# Patient Record
Sex: MALE | Race: WHITE | NOT HISPANIC OR LATINO
[De-identification: names, ages, dates, MRNs, and addresses within clinical notes are randomized per-mention and may not be internally consistent; named-entity substitution may affect disease eponyms.]

---

## 2017-01-19 ENCOUNTER — NON-APPOINTMENT (OUTPATIENT)
Age: 62
End: 2017-01-19

## 2017-02-01 ENCOUNTER — CHART COPY (OUTPATIENT)
Age: 62
End: 2017-02-01

## 2017-02-03 ENCOUNTER — APPOINTMENT (OUTPATIENT)
Dept: PULMONOLOGY | Facility: CLINIC | Age: 62
End: 2017-02-03

## 2017-02-03 VITALS
WEIGHT: 176 LBS | HEIGHT: 66 IN | SYSTOLIC BLOOD PRESSURE: 123 MMHG | DIASTOLIC BLOOD PRESSURE: 80 MMHG | RESPIRATION RATE: 14 BRPM | BODY MASS INDEX: 28.28 KG/M2 | HEART RATE: 54 BPM | OXYGEN SATURATION: 94 %

## 2017-03-02 ENCOUNTER — CLINICAL ADVICE (OUTPATIENT)
Age: 62
End: 2017-03-02

## 2017-03-10 ENCOUNTER — APPOINTMENT (OUTPATIENT)
Dept: CARDIOLOGY | Facility: CLINIC | Age: 62
End: 2017-03-10

## 2017-03-10 ENCOUNTER — NON-APPOINTMENT (OUTPATIENT)
Age: 62
End: 2017-03-10

## 2017-03-10 VITALS
TEMPERATURE: 98.4 F | SYSTOLIC BLOOD PRESSURE: 140 MMHG | HEART RATE: 54 BPM | BODY MASS INDEX: 26.84 KG/M2 | WEIGHT: 167 LBS | OXYGEN SATURATION: 96 % | HEIGHT: 66 IN | DIASTOLIC BLOOD PRESSURE: 72 MMHG

## 2017-03-22 ENCOUNTER — APPOINTMENT (OUTPATIENT)
Dept: CARDIOLOGY | Facility: CLINIC | Age: 62
End: 2017-03-22

## 2017-04-14 ENCOUNTER — APPOINTMENT (OUTPATIENT)
Dept: CARDIOLOGY | Facility: CLINIC | Age: 62
End: 2017-04-14

## 2017-04-14 DIAGNOSIS — I73.9 PERIPHERAL VASCULAR DISEASE, UNSPECIFIED: ICD-10-CM

## 2017-04-17 ENCOUNTER — MEDICATION RENEWAL (OUTPATIENT)
Age: 62
End: 2017-04-17

## 2017-05-03 ENCOUNTER — APPOINTMENT (OUTPATIENT)
Dept: PULMONOLOGY | Facility: CLINIC | Age: 62
End: 2017-05-03

## 2017-05-09 ENCOUNTER — APPOINTMENT (OUTPATIENT)
Dept: RHEUMATOLOGY | Facility: CLINIC | Age: 62
End: 2017-05-09

## 2017-05-09 VITALS
TEMPERATURE: 98.3 F | SYSTOLIC BLOOD PRESSURE: 140 MMHG | BODY MASS INDEX: 26.03 KG/M2 | DIASTOLIC BLOOD PRESSURE: 73 MMHG | OXYGEN SATURATION: 98 % | HEART RATE: 51 BPM | HEIGHT: 66 IN | WEIGHT: 162 LBS

## 2017-05-09 RX ORDER — PRASUGREL HYDROCHLORIDE 5 MG/1
5 TABLET, COATED ORAL
Refills: 0 | Status: DISCONTINUED | COMMUNITY
End: 2017-05-09

## 2017-05-09 RX ORDER — BUDESONIDE 32 UG/1
32 SPRAY, METERED NASAL TWICE DAILY
Qty: 3 | Refills: 0 | Status: DISCONTINUED | COMMUNITY
Start: 2017-02-03 | End: 2017-05-09

## 2017-05-11 LAB
25(OH)D3 SERPL-MCNC: 30.5 NG/ML
ALBUMIN SERPL ELPH-MCNC: 4.6 G/DL
ALP BLD-CCNC: 54 U/L
ALT SERPL-CCNC: 31 U/L
ANION GAP SERPL CALC-SCNC: 15 MMOL/L
AST SERPL-CCNC: 28 U/L
BASOPHILS # BLD AUTO: 0.04 K/UL
BASOPHILS NFR BLD AUTO: 0.7 %
BILIRUB SERPL-MCNC: 0.4 MG/DL
BUN SERPL-MCNC: 24 MG/DL
CALCIUM SERPL-MCNC: 10.1 MG/DL
CHLORIDE SERPL-SCNC: 101 MMOL/L
CK SERPL-CCNC: 330 U/L
CO2 SERPL-SCNC: 27 MMOL/L
CREAT SERPL-MCNC: 1.08 MG/DL
CRP SERPL-MCNC: <0.2 MG/DL
EOSINOPHIL # BLD AUTO: 0.28 K/UL
EOSINOPHIL NFR BLD AUTO: 4.6 %
ERYTHROCYTE [SEDIMENTATION RATE] IN BLOOD BY WESTERGREN METHOD: 11 MM/HR
GGT SERPL-CCNC: 15 U/L
GLUCOSE SERPL-MCNC: 122 MG/DL
HCT VFR BLD CALC: 48.8 %
HGB BLD-MCNC: 15.7 G/DL
IMM GRANULOCYTES NFR BLD AUTO: 0.2 %
LDH SERPL-CCNC: 226 U/L
LYMPHOCYTES # BLD AUTO: 0.73 K/UL
LYMPHOCYTES NFR BLD AUTO: 12 %
MAN DIFF?: NORMAL
MCHC RBC-ENTMCNC: 30.4 PG
MCHC RBC-ENTMCNC: 32.2 GM/DL
MCV RBC AUTO: 94.6 FL
MONOCYTES # BLD AUTO: 0.4 K/UL
MONOCYTES NFR BLD AUTO: 6.6 %
MYOGLOBIN SERPL-MCNC: 113 NG/ML
NEUTROPHILS # BLD AUTO: 4.61 K/UL
NEUTROPHILS NFR BLD AUTO: 75.9 %
PLATELET # BLD AUTO: 188 K/UL
POTASSIUM SERPL-SCNC: 5.3 MMOL/L
PROT SERPL-MCNC: 6.8 G/DL
RBC # BLD: 5.16 M/UL
RBC # FLD: 13.9 %
SODIUM SERPL-SCNC: 143 MMOL/L
T4 FREE SERPL-MCNC: 1.2 NG/DL
TSH SERPL-ACNC: 1.09 UIU/ML
WBC # FLD AUTO: 6.07 K/UL

## 2017-05-18 LAB
MISCELLANEOUS TEST: NORMAL
PROC NAME: NORMAL

## 2017-05-19 ENCOUNTER — APPOINTMENT (OUTPATIENT)
Dept: PULMONOLOGY | Facility: CLINIC | Age: 62
End: 2017-05-19

## 2017-05-19 VITALS
HEART RATE: 50 BPM | SYSTOLIC BLOOD PRESSURE: 120 MMHG | OXYGEN SATURATION: 95 % | WEIGHT: 159 LBS | RESPIRATION RATE: 17 BRPM | HEIGHT: 66 IN | DIASTOLIC BLOOD PRESSURE: 80 MMHG | BODY MASS INDEX: 25.55 KG/M2

## 2017-07-12 ENCOUNTER — APPOINTMENT (OUTPATIENT)
Dept: RHEUMATOLOGY | Facility: CLINIC | Age: 62
End: 2017-07-12

## 2017-07-12 VITALS
OXYGEN SATURATION: 97 % | BODY MASS INDEX: 25.23 KG/M2 | HEART RATE: 60 BPM | SYSTOLIC BLOOD PRESSURE: 124 MMHG | DIASTOLIC BLOOD PRESSURE: 80 MMHG | TEMPERATURE: 98.3 F | HEIGHT: 66 IN | WEIGHT: 157 LBS

## 2017-07-13 LAB
ALBUMIN SERPL ELPH-MCNC: 4.4 G/DL
ALP BLD-CCNC: 44 U/L
ALT SERPL-CCNC: 35 U/L
ANION GAP SERPL CALC-SCNC: 16 MMOL/L
AST SERPL-CCNC: 43 U/L
BASOPHILS # BLD AUTO: 0.03 K/UL
BASOPHILS NFR BLD AUTO: 0.5 %
BILIRUB SERPL-MCNC: 0.5 MG/DL
BUN SERPL-MCNC: 21 MG/DL
CALCIUM SERPL-MCNC: 9.1 MG/DL
CHLORIDE SERPL-SCNC: 102 MMOL/L
CK SERPL-CCNC: 824 U/L
CO2 SERPL-SCNC: 24 MMOL/L
CREAT SERPL-MCNC: 0.94 MG/DL
CRP SERPL-MCNC: <0.2 MG/DL
EOSINOPHIL # BLD AUTO: 0.5 K/UL
EOSINOPHIL NFR BLD AUTO: 8 %
ERYTHROCYTE [SEDIMENTATION RATE] IN BLOOD BY WESTERGREN METHOD: 6 MM/HR
GLUCOSE SERPL-MCNC: 92 MG/DL
HCT VFR BLD CALC: 46.5 %
HGB BLD-MCNC: 15.3 G/DL
IMM GRANULOCYTES NFR BLD AUTO: 0.2 %
LDH SERPL-CCNC: 287 U/L
LYMPHOCYTES # BLD AUTO: 0.98 K/UL
LYMPHOCYTES NFR BLD AUTO: 15.7 %
MAN DIFF?: NORMAL
MCHC RBC-ENTMCNC: 31.2 PG
MCHC RBC-ENTMCNC: 32.9 GM/DL
MCV RBC AUTO: 94.9 FL
MONOCYTES # BLD AUTO: 0.41 K/UL
MONOCYTES NFR BLD AUTO: 6.6 %
MYOGLOBIN SERPL-MCNC: 249 NG/ML
NEUTROPHILS # BLD AUTO: 4.32 K/UL
NEUTROPHILS NFR BLD AUTO: 69 %
PLATELET # BLD AUTO: 191 K/UL
POTASSIUM SERPL-SCNC: 4.7 MMOL/L
PROT SERPL-MCNC: 6.6 G/DL
RBC # BLD: 4.9 M/UL
RBC # FLD: 13.9 %
SODIUM SERPL-SCNC: 142 MMOL/L
WBC # FLD AUTO: 6.25 K/UL

## 2017-08-16 ENCOUNTER — MESSAGE (OUTPATIENT)
Age: 62
End: 2017-08-16

## 2017-08-18 ENCOUNTER — APPOINTMENT (OUTPATIENT)
Dept: PULMONOLOGY | Facility: CLINIC | Age: 62
End: 2017-08-18

## 2017-09-05 ENCOUNTER — APPOINTMENT (OUTPATIENT)
Dept: CARDIOLOGY | Facility: CLINIC | Age: 62
End: 2017-09-05
Payer: COMMERCIAL

## 2017-09-05 ENCOUNTER — NON-APPOINTMENT (OUTPATIENT)
Age: 62
End: 2017-09-05

## 2017-09-05 VITALS
DIASTOLIC BLOOD PRESSURE: 76 MMHG | OXYGEN SATURATION: 96 % | SYSTOLIC BLOOD PRESSURE: 124 MMHG | HEART RATE: 49 BPM | BODY MASS INDEX: 26.2 KG/M2 | HEIGHT: 66 IN | WEIGHT: 163 LBS

## 2017-09-05 DIAGNOSIS — R60.0 LOCALIZED EDEMA: ICD-10-CM

## 2017-09-05 PROCEDURE — 99215 OFFICE O/P EST HI 40 MIN: CPT

## 2017-09-05 PROCEDURE — 93000 ELECTROCARDIOGRAM COMPLETE: CPT

## 2017-09-06 LAB
ALBUMIN SERPL ELPH-MCNC: 4.4 G/DL
ALP BLD-CCNC: 52 U/L
ALT SERPL-CCNC: 27 U/L
ANION GAP SERPL CALC-SCNC: 15 MMOL/L
AST SERPL-CCNC: 26 U/L
BASOPHILS # BLD AUTO: 0.04 K/UL
BASOPHILS NFR BLD AUTO: 0.6 %
BILIRUB SERPL-MCNC: 0.4 MG/DL
BUN SERPL-MCNC: 20 MG/DL
CALCIUM SERPL-MCNC: 9.5 MG/DL
CHLORIDE SERPL-SCNC: 101 MMOL/L
CK SERPL-CCNC: 354 U/L
CO2 SERPL-SCNC: 28 MMOL/L
CREAT SERPL-MCNC: 0.97 MG/DL
EOSINOPHIL # BLD AUTO: 0.35 K/UL
EOSINOPHIL NFR BLD AUTO: 5.4 %
GLUCOSE SERPL-MCNC: 97 MG/DL
HCT VFR BLD CALC: 47.9 %
HGB BLD-MCNC: 15.1 G/DL
IMM GRANULOCYTES NFR BLD AUTO: 0.2 %
LYMPHOCYTES # BLD AUTO: 1.03 K/UL
LYMPHOCYTES NFR BLD AUTO: 15.8 %
MAN DIFF?: NORMAL
MCHC RBC-ENTMCNC: 30.8 PG
MCHC RBC-ENTMCNC: 31.5 GM/DL
MCV RBC AUTO: 97.8 FL
MONOCYTES # BLD AUTO: 0.5 K/UL
MONOCYTES NFR BLD AUTO: 7.7 %
NEUTROPHILS # BLD AUTO: 4.57 K/UL
NEUTROPHILS NFR BLD AUTO: 70.3 %
PLATELET # BLD AUTO: 197 K/UL
POTASSIUM SERPL-SCNC: 4.6 MMOL/L
PROT SERPL-MCNC: 6.4 G/DL
RBC # BLD: 4.9 M/UL
RBC # FLD: 13.7 %
SODIUM SERPL-SCNC: 144 MMOL/L
WBC # FLD AUTO: 6.5 K/UL

## 2017-09-07 ENCOUNTER — FORM ENCOUNTER (OUTPATIENT)
Age: 62
End: 2017-09-07

## 2017-09-08 ENCOUNTER — APPOINTMENT (OUTPATIENT)
Dept: ULTRASOUND IMAGING | Facility: CLINIC | Age: 62
End: 2017-09-08
Payer: COMMERCIAL

## 2017-09-08 ENCOUNTER — APPOINTMENT (OUTPATIENT)
Dept: CARDIOLOGY | Facility: CLINIC | Age: 62
End: 2017-09-08
Payer: COMMERCIAL

## 2017-09-08 ENCOUNTER — OUTPATIENT (OUTPATIENT)
Dept: OUTPATIENT SERVICES | Facility: HOSPITAL | Age: 62
LOS: 1 days | End: 2017-09-08
Payer: COMMERCIAL

## 2017-09-08 VITALS
OXYGEN SATURATION: 93 % | BODY MASS INDEX: 26.31 KG/M2 | HEART RATE: 56 BPM | WEIGHT: 163 LBS | SYSTOLIC BLOOD PRESSURE: 134 MMHG | DIASTOLIC BLOOD PRESSURE: 79 MMHG

## 2017-09-08 DIAGNOSIS — R60.0 LOCALIZED EDEMA: ICD-10-CM

## 2017-09-08 DIAGNOSIS — Z98.89 OTHER SPECIFIED POSTPROCEDURAL STATES: Chronic | ICD-10-CM

## 2017-09-08 PROCEDURE — 93970 EXTREMITY STUDY: CPT

## 2017-09-08 PROCEDURE — 99214 OFFICE O/P EST MOD 30 MIN: CPT

## 2017-09-08 PROCEDURE — 93970 EXTREMITY STUDY: CPT | Mod: 26

## 2017-09-08 RX ORDER — PREDNISONE 10 MG/1
10 TABLET ORAL
Qty: 1 | Refills: 0 | Status: DISCONTINUED | COMMUNITY
Start: 2017-05-19 | End: 2017-09-08

## 2017-09-08 RX ORDER — DICLOFENAC SODIUM AND MISOPROSTOL 75; 200 MG/1; UG/1
75-0.2 TABLET, DELAYED RELEASE ORAL
Qty: 60 | Refills: 2 | Status: DISCONTINUED | COMMUNITY
Start: 2017-02-03 | End: 2017-09-08

## 2017-09-08 RX ORDER — CLOPIDOGREL BISULFATE 75 MG/1
75 TABLET, FILM COATED ORAL
Qty: 90 | Refills: 0 | Status: DISCONTINUED | COMMUNITY
Start: 2017-05-09 | End: 2017-09-08

## 2017-11-08 ENCOUNTER — NON-APPOINTMENT (OUTPATIENT)
Age: 62
End: 2017-11-08

## 2017-11-08 ENCOUNTER — APPOINTMENT (OUTPATIENT)
Dept: CARDIOLOGY | Facility: CLINIC | Age: 62
End: 2017-11-08
Payer: COMMERCIAL

## 2017-11-08 VITALS
OXYGEN SATURATION: 95 % | HEART RATE: 52 BPM | HEIGHT: 66 IN | WEIGHT: 161 LBS | BODY MASS INDEX: 25.88 KG/M2 | DIASTOLIC BLOOD PRESSURE: 80 MMHG | TEMPERATURE: 98.2 F | SYSTOLIC BLOOD PRESSURE: 132 MMHG

## 2017-11-08 PROCEDURE — 99215 OFFICE O/P EST HI 40 MIN: CPT

## 2017-11-08 PROCEDURE — 93000 ELECTROCARDIOGRAM COMPLETE: CPT

## 2017-11-08 RX ORDER — AZITHROMYCIN 500 MG/1
500 TABLET, FILM COATED ORAL DAILY
Qty: 7 | Refills: 0 | Status: DISCONTINUED | COMMUNITY
Start: 2017-05-19 | End: 2017-11-08

## 2017-11-09 LAB
ALBUMIN SERPL ELPH-MCNC: 4.3 G/DL
ALP BLD-CCNC: 52 U/L
ALT SERPL-CCNC: 30 U/L
ANION GAP SERPL CALC-SCNC: 14 MMOL/L
AST SERPL-CCNC: 25 U/L
BASOPHILS # BLD AUTO: 0.05 K/UL
BASOPHILS NFR BLD AUTO: 0.8 %
BILIRUB SERPL-MCNC: 0.4 MG/DL
BUN SERPL-MCNC: 28 MG/DL
CALCIUM SERPL-MCNC: 9.4 MG/DL
CHLORIDE SERPL-SCNC: 101 MMOL/L
CHOLEST SERPL-MCNC: 165 MG/DL
CHOLEST/HDLC SERPL: 2.4 RATIO
CK SERPL-CCNC: 336 U/L
CO2 SERPL-SCNC: 25 MMOL/L
CREAT SERPL-MCNC: 1.14 MG/DL
EOSINOPHIL # BLD AUTO: 0.57 K/UL
EOSINOPHIL NFR BLD AUTO: 9.4 %
GLUCOSE SERPL-MCNC: 110 MG/DL
HCT VFR BLD CALC: 48.4 %
HDLC SERPL-MCNC: 68 MG/DL
HGB BLD-MCNC: 16.1 G/DL
IMM GRANULOCYTES NFR BLD AUTO: 0.2 %
LDLC SERPL CALC-MCNC: 81 MG/DL
LYMPHOCYTES # BLD AUTO: 1.04 K/UL
LYMPHOCYTES NFR BLD AUTO: 17.2 %
MAN DIFF?: NORMAL
MCHC RBC-ENTMCNC: 31.9 PG
MCHC RBC-ENTMCNC: 33.3 GM/DL
MCV RBC AUTO: 95.8 FL
MONOCYTES # BLD AUTO: 0.42 K/UL
MONOCYTES NFR BLD AUTO: 6.9 %
NEUTROPHILS # BLD AUTO: 3.96 K/UL
NEUTROPHILS NFR BLD AUTO: 65.5 %
PLATELET # BLD AUTO: 166 K/UL
POTASSIUM SERPL-SCNC: 5.3 MMOL/L
PROT SERPL-MCNC: 6.4 G/DL
RBC # BLD: 5.05 M/UL
RBC # FLD: 13.2 %
SODIUM SERPL-SCNC: 140 MMOL/L
TRIGL SERPL-MCNC: 79 MG/DL
WBC # FLD AUTO: 6.05 K/UL

## 2017-11-16 DIAGNOSIS — F32.9 MAJOR DEPRESSIVE DISORDER, SINGLE EPISODE, UNSPECIFIED: ICD-10-CM

## 2017-11-16 RX ORDER — ESCITALOPRAM OXALATE 10 MG/1
10 TABLET ORAL
Qty: 30 | Refills: 3 | Status: DISCONTINUED | COMMUNITY
Start: 2017-11-10 | End: 2017-11-16

## 2017-12-08 ENCOUNTER — APPOINTMENT (OUTPATIENT)
Dept: CARDIOLOGY | Facility: CLINIC | Age: 62
End: 2017-12-08
Payer: COMMERCIAL

## 2017-12-08 ENCOUNTER — NON-APPOINTMENT (OUTPATIENT)
Age: 62
End: 2017-12-08

## 2017-12-08 VITALS
OXYGEN SATURATION: 98 % | BODY MASS INDEX: 26.03 KG/M2 | HEART RATE: 51 BPM | DIASTOLIC BLOOD PRESSURE: 78 MMHG | WEIGHT: 162 LBS | HEIGHT: 66 IN | SYSTOLIC BLOOD PRESSURE: 122 MMHG | TEMPERATURE: 97.6 F

## 2017-12-08 PROCEDURE — 99215 OFFICE O/P EST HI 40 MIN: CPT

## 2017-12-08 PROCEDURE — 93000 ELECTROCARDIOGRAM COMPLETE: CPT

## 2018-01-12 ENCOUNTER — APPOINTMENT (OUTPATIENT)
Dept: PULMONOLOGY | Facility: CLINIC | Age: 63
End: 2018-01-12
Payer: COMMERCIAL

## 2018-01-12 VITALS
RESPIRATION RATE: 17 BRPM | SYSTOLIC BLOOD PRESSURE: 130 MMHG | BODY MASS INDEX: 26.03 KG/M2 | OXYGEN SATURATION: 95 % | DIASTOLIC BLOOD PRESSURE: 70 MMHG | TEMPERATURE: 100 F | WEIGHT: 162 LBS | HEIGHT: 66 IN | HEART RATE: 75 BPM

## 2018-01-12 PROCEDURE — 71046 X-RAY EXAM CHEST 2 VIEWS: CPT

## 2018-01-12 PROCEDURE — 99214 OFFICE O/P EST MOD 30 MIN: CPT | Mod: 25

## 2018-01-14 ENCOUNTER — EMERGENCY (EMERGENCY)
Facility: HOSPITAL | Age: 63
LOS: 1 days | Discharge: HOME | End: 2018-01-14
Attending: EMERGENCY MEDICINE
Payer: COMMERCIAL

## 2018-01-14 VITALS
OXYGEN SATURATION: 98 % | TEMPERATURE: 97 F | HEART RATE: 51 BPM | DIASTOLIC BLOOD PRESSURE: 74 MMHG | SYSTOLIC BLOOD PRESSURE: 113 MMHG | RESPIRATION RATE: 17 BRPM

## 2018-01-14 VITALS
OXYGEN SATURATION: 99 % | TEMPERATURE: 99 F | RESPIRATION RATE: 18 BRPM | SYSTOLIC BLOOD PRESSURE: 118 MMHG | HEART RATE: 58 BPM | DIASTOLIC BLOOD PRESSURE: 70 MMHG

## 2018-01-14 DIAGNOSIS — Z98.89 OTHER SPECIFIED POSTPROCEDURAL STATES: Chronic | ICD-10-CM

## 2018-01-14 LAB
ALBUMIN SERPL ELPH-MCNC: 3.9 G/DL — SIGNIFICANT CHANGE UP (ref 3.3–5)
ALP SERPL-CCNC: 57 U/L — SIGNIFICANT CHANGE UP (ref 40–120)
ALT FLD-CCNC: 59 U/L RC — HIGH (ref 10–45)
ANION GAP SERPL CALC-SCNC: 11 MMOL/L — SIGNIFICANT CHANGE UP (ref 5–17)
APPEARANCE UR: CLEAR — SIGNIFICANT CHANGE UP
AST SERPL-CCNC: 44 U/L — HIGH (ref 10–40)
BASOPHILS # BLD AUTO: 0 K/UL — SIGNIFICANT CHANGE UP (ref 0–0.2)
BASOPHILS NFR BLD AUTO: 0.1 % — SIGNIFICANT CHANGE UP (ref 0–2)
BILIRUB SERPL-MCNC: 0.3 MG/DL — SIGNIFICANT CHANGE UP (ref 0.2–1.2)
BILIRUB UR-MCNC: NEGATIVE — SIGNIFICANT CHANGE UP
BUN SERPL-MCNC: 16 MG/DL — SIGNIFICANT CHANGE UP (ref 7–23)
CALCIUM SERPL-MCNC: 9.1 MG/DL — SIGNIFICANT CHANGE UP (ref 8.4–10.5)
CHLORIDE SERPL-SCNC: 99 MMOL/L — SIGNIFICANT CHANGE UP (ref 96–108)
CO2 SERPL-SCNC: 26 MMOL/L — SIGNIFICANT CHANGE UP (ref 22–31)
COLOR SPEC: SIGNIFICANT CHANGE UP
CREAT SERPL-MCNC: 1.02 MG/DL — SIGNIFICANT CHANGE UP (ref 0.5–1.3)
DIFF PNL FLD: NEGATIVE — SIGNIFICANT CHANGE UP
EOSINOPHIL # BLD AUTO: 0.3 K/UL — SIGNIFICANT CHANGE UP (ref 0–0.5)
EOSINOPHIL NFR BLD AUTO: 6.4 % — HIGH (ref 0–6)
GAS PNL BLDV: SIGNIFICANT CHANGE UP
GLUCOSE SERPL-MCNC: 86 MG/DL — SIGNIFICANT CHANGE UP (ref 70–99)
GLUCOSE UR QL: NEGATIVE — SIGNIFICANT CHANGE UP
HCT VFR BLD CALC: 48.8 % — SIGNIFICANT CHANGE UP (ref 39–50)
HGB BLD-MCNC: 16.6 G/DL — SIGNIFICANT CHANGE UP (ref 13–17)
KETONES UR-MCNC: NEGATIVE — SIGNIFICANT CHANGE UP
LEUKOCYTE ESTERASE UR-ACNC: NEGATIVE — SIGNIFICANT CHANGE UP
LYMPHOCYTES # BLD AUTO: 0.9 K/UL — LOW (ref 1–3.3)
LYMPHOCYTES # BLD AUTO: 18.1 % — SIGNIFICANT CHANGE UP (ref 13–44)
MCHC RBC-ENTMCNC: 33.2 PG — SIGNIFICANT CHANGE UP (ref 27–34)
MCHC RBC-ENTMCNC: 34.1 GM/DL — SIGNIFICANT CHANGE UP (ref 32–36)
MCV RBC AUTO: 97.4 FL — SIGNIFICANT CHANGE UP (ref 80–100)
MONOCYTES # BLD AUTO: 0.7 K/UL — SIGNIFICANT CHANGE UP (ref 0–0.9)
MONOCYTES NFR BLD AUTO: 13.9 % — SIGNIFICANT CHANGE UP (ref 2–14)
NEUTROPHILS # BLD AUTO: 3.1 K/UL — SIGNIFICANT CHANGE UP (ref 1.8–7.4)
NEUTROPHILS NFR BLD AUTO: 61.5 % — SIGNIFICANT CHANGE UP (ref 43–77)
NITRITE UR-MCNC: NEGATIVE — SIGNIFICANT CHANGE UP
PH UR: 6 — SIGNIFICANT CHANGE UP (ref 5–8)
PLATELET # BLD AUTO: 105 K/UL — LOW (ref 150–400)
POTASSIUM SERPL-MCNC: 4.8 MMOL/L — SIGNIFICANT CHANGE UP (ref 3.5–5.3)
POTASSIUM SERPL-SCNC: 4.8 MMOL/L — SIGNIFICANT CHANGE UP (ref 3.5–5.3)
PROT SERPL-MCNC: 7.1 G/DL — SIGNIFICANT CHANGE UP (ref 6–8.3)
PROT UR-MCNC: NEGATIVE — SIGNIFICANT CHANGE UP
RBC # BLD: 5.01 M/UL — SIGNIFICANT CHANGE UP (ref 4.2–5.8)
RBC # FLD: 12.4 % — SIGNIFICANT CHANGE UP (ref 10.3–14.5)
SODIUM SERPL-SCNC: 136 MMOL/L — SIGNIFICANT CHANGE UP (ref 135–145)
SP GR SPEC: 1.01 — SIGNIFICANT CHANGE UP (ref 1.01–1.02)
UROBILINOGEN FLD QL: NEGATIVE — SIGNIFICANT CHANGE UP
WBC # BLD: 5 K/UL — SIGNIFICANT CHANGE UP (ref 3.8–10.5)
WBC # FLD AUTO: 5 K/UL — SIGNIFICANT CHANGE UP (ref 3.8–10.5)
WBC UR QL: SIGNIFICANT CHANGE UP /HPF (ref 0–5)

## 2018-01-14 PROCEDURE — 85027 COMPLETE CBC AUTOMATED: CPT

## 2018-01-14 PROCEDURE — 81001 URINALYSIS AUTO W/SCOPE: CPT

## 2018-01-14 PROCEDURE — 82435 ASSAY OF BLOOD CHLORIDE: CPT

## 2018-01-14 PROCEDURE — 83605 ASSAY OF LACTIC ACID: CPT

## 2018-01-14 PROCEDURE — 96372 THER/PROPH/DIAG INJ SC/IM: CPT

## 2018-01-14 PROCEDURE — 99283 EMERGENCY DEPT VISIT LOW MDM: CPT | Mod: 25

## 2018-01-14 PROCEDURE — 82330 ASSAY OF CALCIUM: CPT

## 2018-01-14 PROCEDURE — 84295 ASSAY OF SERUM SODIUM: CPT

## 2018-01-14 PROCEDURE — 80053 COMPREHEN METABOLIC PANEL: CPT

## 2018-01-14 PROCEDURE — 82947 ASSAY GLUCOSE BLOOD QUANT: CPT

## 2018-01-14 PROCEDURE — 71046 X-RAY EXAM CHEST 2 VIEWS: CPT | Mod: 26

## 2018-01-14 PROCEDURE — 71046 X-RAY EXAM CHEST 2 VIEWS: CPT

## 2018-01-14 PROCEDURE — 84132 ASSAY OF SERUM POTASSIUM: CPT

## 2018-01-14 PROCEDURE — 99284 EMERGENCY DEPT VISIT MOD MDM: CPT

## 2018-01-14 PROCEDURE — 82803 BLOOD GASES ANY COMBINATION: CPT

## 2018-01-14 PROCEDURE — 85014 HEMATOCRIT: CPT

## 2018-01-14 RX ORDER — CEFTRIAXONE 500 MG/1
250 INJECTION, POWDER, FOR SOLUTION INTRAMUSCULAR; INTRAVENOUS ONCE
Qty: 0 | Refills: 0 | Status: COMPLETED | OUTPATIENT
Start: 2018-01-14 | End: 2018-01-14

## 2018-01-14 RX ORDER — AZITHROMYCIN 500 MG/1
1000 TABLET, FILM COATED ORAL ONCE
Qty: 0 | Refills: 0 | Status: COMPLETED | OUTPATIENT
Start: 2018-01-14 | End: 2018-01-14

## 2018-01-14 RX ADMIN — AZITHROMYCIN 1000 MILLIGRAM(S): 500 TABLET, FILM COATED ORAL at 17:14

## 2018-01-14 RX ADMIN — CEFTRIAXONE 250 MILLIGRAM(S): 500 INJECTION, POWDER, FOR SOLUTION INTRAMUSCULAR; INTRAVENOUS at 17:14

## 2018-01-14 NOTE — ED ADULT NURSE NOTE - CHPI ED SYMPTOMS NEG
no pain/no decreased eating/drinking/no chills/no nausea/no numbness/no vomiting/no dizziness/no fever/no weakness/no tingling

## 2018-01-14 NOTE — ED PROVIDER NOTE - CARE PLAN
Principal Discharge DX:	Fever, unspecified fever cause  Instructions for follow-up, activity and diet:	You were seen in the ER for fever and penile pain. You must follow up with your primary physician in 24 to 48 hours. Return to the ER for any new or worsening signs/symptoms.   1) Take your antibiotics as prescribed.   2) Always wear condoms when having sexual intercourse.  Secondary Diagnosis:	Penile pain Principal Discharge DX:	Fever, unspecified fever cause  Assessment and plan of treatment:	You were seen in the ER for fever and penile pain. You must follow up with your primary physician in 24 to 48 hours. Return to the ER for any new or worsening signs/symptoms.   1) Take your antibiotics as prescribed.   2) Always wear condoms when having sexual intercourse.  Secondary Diagnosis:	Penile pain

## 2018-01-14 NOTE — ED PROVIDER NOTE - NS ED ROS FT
EYES: no change in vision, //             HEENT: no trouble swallowing or speaking, //             CARDIAC: no chest pain, //              PULMONARY: no cough or SOB, //             GI: no abdominal pain, no nausea or no vomiting, no diarrhea or constipation, //             : No changes in urination,  //            SKIN: no rashes,  //            NEURO: no headache,  //             MSK: No joint pain otherwise as HPI or negative. ~Edmund Askew M.D., Ph.D. -Resident

## 2018-01-14 NOTE — ED PROVIDER NOTE - PHYSICAL EXAMINATION
Gen: NAD, AOx3, non-toxic //            Head: NCAT //            HEENT: EOMI, oral mucosa moist, normal conjunctiva //            Lung: CTAB, no respiratory distress, no wheezes/rhonchi/rales B/L, speaking in full sentences. //            CV: RRR, no murmurs, rubs or gallops //            Abd: soft, NTND, no guarding, no CVA tenderness //            MSK: no visible deformities //            Neuro: No focal sensory or motor deficits //            Skin: Warm, well perfused, no rash //    : normal genitalia with normal cremasteric reflex and no tenderness throughout.         Psych: normal affect. ~Edmund Askew M.D., Ph.D. -Resident

## 2018-01-14 NOTE — ED PROVIDER NOTE - PROGRESS NOTE DETAILS
Patient's blood work, urinalysis, CXR unremarkable. Treated for STIs. Will d/c home with close PCP Follow up. Attending MD Kraft: Patient re-evaluated and feeling improved.  No acute issues at  this time.  Lab and radiology tests reviewed with patient and/or family.  Patient stable for discharge. Attending MD Kraft: Patient re-evaluated and feeling improved.  Lab and radiology tests reviewed with patient.  Patient stable for discharge.  Follow up instructions given, importance of follow up emphasized, return to ED parameters reviewed and patient verbalized understanding.  All questions answered, all concerns addressed.

## 2018-01-14 NOTE — ED ADULT NURSE NOTE - OBJECTIVE STATEMENT
62 year old male presented to ED with c/o of burning on urination/fever since January 11th. Pt went to urgent care on January 11th and placed on antibiotics. Pt states the burning has not stopped, but has gotten better. Pt denies blood in urine/stool. Pt denies CP, SOB, nausea/vomiting, numbness/tingling, cough, chills, dizziness, headache. Pt is afebrile in ED, temperature 99.1 orally. Pt a&ox3, lung sounds clear, heart rate regular, abdomen soft nontender nondistended to palp. Skin intact. IV in right AC 20G and patent. Pt currently resting in bed with side rails up for safety. Will continue to monitor and assess while offering support and reassurance.

## 2018-01-14 NOTE — ED PROVIDER NOTE - ATTENDING CONTRIBUTION TO CARE
Attending MD Kraft: I personally have seen and examined this patient.  Resident note reviewed and agree on plan of care and except where noted.  See below for details.     62M with PMH including BPH, CAD x stents, sarcoid, IBS, "inflammed prostate" presents to the ED with penile pain and fever.  Reports fever with mild burning on urination.  Nick was having fevers to 103 last night.  Nick symptoms started on Thursday, was seen at urgent care on 1/11/18 and was started on Cipro.  Took 1 pill x 2, nick had on his stomach.  Switched to Bactrim BID x 2d.  Nick continues to have painful, difficulty urination.  Nick is sexually active with extramarital partner including this week, denies condom use, denies history of STIs.  Declined HIV testing.  Nick saw pulmonologist earlier this week and had nonactionable CXR.  Denies abdominal pain, nausea, vomiting, blood in stools.  Denies magalie hematuria.  Denies chest pain, shortness of breath, palpitations. Meds: Toprol, Altase, Lipitor, ASA, Neurontin.  Denies Allergies.  Denies EtOH, tobacco, drugs.  On exam, NAD, head NCAT, PERRL, FROM at neck, no tenderness to palpation or stepoffs along length of spine, lungs CTAB with good inspiratory effort, +S1S2, no m/r/g, abdomen soft with +BS, NT, ND, no CVAT, genital exam with Dr. Askew chaperone, no penile lesions, no inguinal LAD, no testicular tenderness to palpation, no blood or purulence at meatus, moving all extremities with 5/5 strength bilateral upper and lower extremities, good and equal  strength bilaterally; A/P: 62M with penile pain and fever, Ddx includes UTI, STI, will obtain UA, G&C, will treat with azithro and Ceftriaxone empirically, counseled on having all partners tested and abstaining until all have completed treatment to avoid re-infection if +, declined HIV, given worsening fever, will obtain CXR and labs to insure no other infectious etiology, reassess

## 2018-01-14 NOTE — ED PROVIDER NOTE - MEDICAL DECISION MAKING DETAILS
Patient with fever of uknown origin but most likely penile. may be 2/2 STI vs e.coli. Patient already being treated by abx outpatient. Will treat for STI here, do CXr to look for pna. likely d/c home.

## 2018-01-14 NOTE — ED PROVIDER NOTE - PLAN OF CARE
You were seen in the ER for fever and penile pain. You must follow up with your primary physician in 24 to 48 hours. Return to the ER for any new or worsening signs/symptoms.   1) Take your antibiotics as prescribed.   2) Always wear condoms when having sexual intercourse.

## 2018-01-14 NOTE — ED PROVIDER NOTE - OBJECTIVE STATEMENT
62 male history of BPH here for penile pain and fever. States had penile pain that started thu, went to urgent care, was started on cipro. Friday pain did not improve, was switched to cephalosporin but continued to have fevers to tmax 103 orally yesterday. Penile pain has since improved and today no fevers. Had CXR 2 days ago that was normal at his pulmonoligist office. Had unprotected sexual intercourse recently with new partner.

## 2018-01-14 NOTE — ED ADULT NURSE NOTE - PSH
H/O cardiac catheterization    S/P colonoscopy    S/P endoscopy    S/P primary angioplasty with coronary stent  2 stents

## 2018-01-14 NOTE — ED ADULT NURSE NOTE - PMH
BPH (Benign Prostatic Hyperplasia)    CAD (coronary artery disease)  stents x2 1995  GI bleed  abdominal pain and bloody diarrhea which resolved on its own no relation to antibiotics Colonoscopy did not reveal abnormality.  Pt states occurred two times over 5 years ago.  H/O hyperlipidemia    HTN - Hypertension    Myalgia    NSVT (nonsustained ventricular tachycardia)  found on holter monitor  Sarcoidosis    Sleep apnea  diagnosed by sleep study - unable to tolerate CPAP  Stomach ulcer

## 2018-01-14 NOTE — ED ADULT TRIAGE NOTE - CHIEF COMPLAINT QUOTE
pt c/o fever associated with mild burning during urination.  pt [facility] but the fever has worsened

## 2018-01-15 LAB
C TRACH RRNA SPEC QL NAA+PROBE: SIGNIFICANT CHANGE UP
N GONORRHOEA RRNA SPEC QL NAA+PROBE: SIGNIFICANT CHANGE UP
SPECIMEN SOURCE: SIGNIFICANT CHANGE UP

## 2018-01-19 ENCOUNTER — EMERGENCY (EMERGENCY)
Facility: HOSPITAL | Age: 63
LOS: 1 days | Discharge: HOME | End: 2018-01-19
Attending: EMERGENCY MEDICINE | Admitting: EMERGENCY MEDICINE
Payer: COMMERCIAL

## 2018-01-19 VITALS
OXYGEN SATURATION: 99 % | HEART RATE: 95 BPM | DIASTOLIC BLOOD PRESSURE: 85 MMHG | RESPIRATION RATE: 17 BRPM | SYSTOLIC BLOOD PRESSURE: 137 MMHG | TEMPERATURE: 98 F

## 2018-01-19 DIAGNOSIS — Z98.89 OTHER SPECIFIED POSTPROCEDURAL STATES: Chronic | ICD-10-CM

## 2018-01-19 PROCEDURE — 99285 EMERGENCY DEPT VISIT HI MDM: CPT | Mod: 25

## 2018-01-19 RX ORDER — LIDOCAINE 4 G/100G
10 CREAM TOPICAL ONCE
Qty: 0 | Refills: 0 | Status: COMPLETED | OUTPATIENT
Start: 2018-01-19 | End: 2018-01-19

## 2018-01-19 RX ORDER — MORPHINE SULFATE 50 MG/1
4 CAPSULE, EXTENDED RELEASE ORAL ONCE
Qty: 0 | Refills: 0 | Status: DISCONTINUED | OUTPATIENT
Start: 2018-01-19 | End: 2018-01-19

## 2018-01-19 RX ADMIN — Medication 10 MILLILITER(S): at 23:33

## 2018-01-19 RX ADMIN — LIDOCAINE 10 MILLILITER(S): 4 CREAM TOPICAL at 23:37

## 2018-01-19 RX ADMIN — Medication 30 MILLILITER(S): at 23:37

## 2018-01-19 RX ADMIN — MORPHINE SULFATE 4 MILLIGRAM(S): 50 CAPSULE, EXTENDED RELEASE ORAL at 23:38

## 2018-01-19 NOTE — ED ADULT NURSE NOTE - OBJECTIVE STATEMENT
62 y.o male aaox3 ambulatory with h/o PH (Benign Prostatic Hyperplasia)  CAD (coronary artery disease)  stents x2 1995GI bleed  abdominal pain andbloodydiarrhea which resolved on its own no relation to antibiotics Colonoscopy did not reveal abnormality.  Pt states occurred two times over 5 years ago.H/O hyperlipidemia  HTN - Hypertension  Myalgia  NSVT (nonsustained ventricular tachycardia)  found on holter monitorSarcoidosis  Sleep apnea  diagnosed by sleep study - unable to tolerate CPAP Stomach ulcer presented today with c/o Abd pain,  radiating to the back.  Patient had recent visit to [facility] ED for penile pain and dc and on current antibiotic treatment for STD. Denies nausea, vomiting, blood in urine or stool.

## 2018-01-20 VITALS
HEART RATE: 66 BPM | OXYGEN SATURATION: 97 % | SYSTOLIC BLOOD PRESSURE: 138 MMHG | DIASTOLIC BLOOD PRESSURE: 83 MMHG | RESPIRATION RATE: 18 BRPM | TEMPERATURE: 99 F

## 2018-01-20 LAB
ANION GAP SERPL CALC-SCNC: 9 MMOL/L — SIGNIFICANT CHANGE UP (ref 5–17)
APPEARANCE UR: CLEAR — SIGNIFICANT CHANGE UP
BILIRUB UR-MCNC: NEGATIVE — SIGNIFICANT CHANGE UP
BUN SERPL-MCNC: 20 MG/DL — SIGNIFICANT CHANGE UP (ref 7–23)
CALCIUM SERPL-MCNC: 8.8 MG/DL — SIGNIFICANT CHANGE UP (ref 8.4–10.5)
CHLORIDE SERPL-SCNC: 103 MMOL/L — SIGNIFICANT CHANGE UP (ref 96–108)
CO2 SERPL-SCNC: 27 MMOL/L — SIGNIFICANT CHANGE UP (ref 22–31)
COLOR SPEC: SIGNIFICANT CHANGE UP
CREAT SERPL-MCNC: 1.17 MG/DL — SIGNIFICANT CHANGE UP (ref 0.5–1.3)
DIFF PNL FLD: NEGATIVE — SIGNIFICANT CHANGE UP
GLUCOSE SERPL-MCNC: 106 MG/DL — HIGH (ref 70–99)
GLUCOSE UR QL: NEGATIVE — SIGNIFICANT CHANGE UP
KETONES UR-MCNC: NEGATIVE — SIGNIFICANT CHANGE UP
LEUKOCYTE ESTERASE UR-ACNC: NEGATIVE — SIGNIFICANT CHANGE UP
NITRITE UR-MCNC: NEGATIVE — SIGNIFICANT CHANGE UP
PH UR: 7.5 — SIGNIFICANT CHANGE UP (ref 5–8)
POTASSIUM SERPL-MCNC: 4.7 MMOL/L — SIGNIFICANT CHANGE UP (ref 3.5–5.3)
POTASSIUM SERPL-SCNC: 4.7 MMOL/L — SIGNIFICANT CHANGE UP (ref 3.5–5.3)
PROT UR-MCNC: NEGATIVE — SIGNIFICANT CHANGE UP
SODIUM SERPL-SCNC: 139 MMOL/L — SIGNIFICANT CHANGE UP (ref 135–145)
SP GR SPEC: 1.01 — SIGNIFICANT CHANGE UP (ref 1.01–1.02)
UROBILINOGEN FLD QL: NEGATIVE — SIGNIFICANT CHANGE UP

## 2018-01-20 PROCEDURE — 80048 BASIC METABOLIC PNL TOTAL CA: CPT

## 2018-01-20 PROCEDURE — 84132 ASSAY OF SERUM POTASSIUM: CPT

## 2018-01-20 PROCEDURE — 82435 ASSAY OF BLOOD CHLORIDE: CPT

## 2018-01-20 PROCEDURE — 85730 THROMBOPLASTIN TIME PARTIAL: CPT

## 2018-01-20 PROCEDURE — 85027 COMPLETE CBC AUTOMATED: CPT

## 2018-01-20 PROCEDURE — 85610 PROTHROMBIN TIME: CPT

## 2018-01-20 PROCEDURE — 99284 EMERGENCY DEPT VISIT MOD MDM: CPT | Mod: 25

## 2018-01-20 PROCEDURE — 83605 ASSAY OF LACTIC ACID: CPT

## 2018-01-20 PROCEDURE — 82803 BLOOD GASES ANY COMBINATION: CPT

## 2018-01-20 PROCEDURE — 81003 URINALYSIS AUTO W/O SCOPE: CPT

## 2018-01-20 PROCEDURE — 84484 ASSAY OF TROPONIN QUANT: CPT

## 2018-01-20 PROCEDURE — 74177 CT ABD & PELVIS W/CONTRAST: CPT

## 2018-01-20 PROCEDURE — 96374 THER/PROPH/DIAG INJ IV PUSH: CPT | Mod: XU

## 2018-01-20 PROCEDURE — 84295 ASSAY OF SERUM SODIUM: CPT

## 2018-01-20 PROCEDURE — 83690 ASSAY OF LIPASE: CPT

## 2018-01-20 PROCEDURE — 82330 ASSAY OF CALCIUM: CPT

## 2018-01-20 PROCEDURE — 74177 CT ABD & PELVIS W/CONTRAST: CPT | Mod: 26

## 2018-01-20 PROCEDURE — 80053 COMPREHEN METABOLIC PANEL: CPT

## 2018-01-20 PROCEDURE — 93005 ELECTROCARDIOGRAM TRACING: CPT

## 2018-01-20 PROCEDURE — 82565 ASSAY OF CREATININE: CPT

## 2018-01-20 PROCEDURE — 85014 HEMATOCRIT: CPT

## 2018-01-20 PROCEDURE — 82947 ASSAY GLUCOSE BLOOD QUANT: CPT

## 2018-01-20 RX ORDER — SODIUM CHLORIDE 9 MG/ML
1000 INJECTION INTRAMUSCULAR; INTRAVENOUS; SUBCUTANEOUS ONCE
Qty: 0 | Refills: 0 | Status: COMPLETED | OUTPATIENT
Start: 2018-01-20 | End: 2018-01-20

## 2018-01-20 RX ORDER — ACETAMINOPHEN 500 MG
975 TABLET ORAL ONCE
Qty: 0 | Refills: 0 | Status: COMPLETED | OUTPATIENT
Start: 2018-01-20 | End: 2018-01-20

## 2018-01-20 RX ORDER — SODIUM CHLORIDE 9 MG/ML
1000 INJECTION INTRAMUSCULAR; INTRAVENOUS; SUBCUTANEOUS
Qty: 0 | Refills: 0 | Status: DISCONTINUED | OUTPATIENT
Start: 2018-01-20 | End: 2018-01-23

## 2018-01-20 RX ORDER — KETOROLAC TROMETHAMINE 30 MG/ML
15 SYRINGE (ML) INJECTION ONCE
Qty: 0 | Refills: 0 | Status: DISCONTINUED | OUTPATIENT
Start: 2018-01-20 | End: 2018-01-20

## 2018-01-20 RX ORDER — POLYETHYLENE GLYCOL 3350 17 G/17G
17 POWDER, FOR SOLUTION ORAL ONCE
Qty: 0 | Refills: 0 | Status: COMPLETED | OUTPATIENT
Start: 2018-01-20 | End: 2018-01-20

## 2018-01-20 RX ORDER — OXYCODONE HYDROCHLORIDE 5 MG/1
5 TABLET ORAL ONCE
Qty: 0 | Refills: 0 | Status: DISCONTINUED | OUTPATIENT
Start: 2018-01-20 | End: 2018-01-20

## 2018-01-20 RX ADMIN — OXYCODONE HYDROCHLORIDE 5 MILLIGRAM(S): 5 TABLET ORAL at 05:32

## 2018-01-20 RX ADMIN — MORPHINE SULFATE 4 MILLIGRAM(S): 50 CAPSULE, EXTENDED RELEASE ORAL at 00:58

## 2018-01-20 RX ADMIN — SODIUM CHLORIDE 2000 MILLILITER(S): 9 INJECTION INTRAMUSCULAR; INTRAVENOUS; SUBCUTANEOUS at 00:47

## 2018-01-20 RX ADMIN — OXYCODONE HYDROCHLORIDE 5 MILLIGRAM(S): 5 TABLET ORAL at 05:00

## 2018-01-20 RX ADMIN — SODIUM CHLORIDE 150 MILLILITER(S): 9 INJECTION INTRAMUSCULAR; INTRAVENOUS; SUBCUTANEOUS at 06:00

## 2018-01-20 RX ADMIN — POLYETHYLENE GLYCOL 3350 17 GRAM(S): 17 POWDER, FOR SOLUTION ORAL at 04:55

## 2018-01-20 RX ADMIN — SODIUM CHLORIDE 150 MILLILITER(S): 9 INJECTION INTRAMUSCULAR; INTRAVENOUS; SUBCUTANEOUS at 00:50

## 2018-01-20 NOTE — ED PROVIDER NOTE - OBJECTIVE STATEMENT
63 y.o. male pw epigastric ab pain radiating to the back, sudden onset x 1 day, severe 8/10. Associated with nausea but denies vomiting. Pain sharp and constant but waxes and wanes. Also with constipation. No fevers, chills, or recent illness. 63 y.o. male pw epigastric ab pain radiating to the back, sudden onset x 2 days, severe 8/10. Associated with nausea but denies vomiting. Pain sharp and constant but waxes and wanes. Worse with eating. NOnexertional. Also with constipation. Pain relieved with flatulance. No fevers, chills, or recent illness. Not eating or drinking much 2/2 post-prandial pain

## 2018-01-20 NOTE — ED PROVIDER NOTE - PHYSICAL EXAMINATION
Gen: Well appearing, NAD  Head: NCAT  HEENT: MMM, Normal conjunctiva  Lung: CTAB, no rales, rhonchi or wheezing  CV: RRR, no murmurs, rubs or gallops  Abd: soft, +epigastric tenderness, no rebound or guarding   MSK: No CVA tenderness. No edema, no visible deformities  Neuro: No focal neurologic deficits.   Skin: Warm and dry, no evidence of rash  Psych: normal mood and affect, A&Ox3 Gen: Well appearing, NAD  Head: NCAT  HEENT: MMM, Normal conjunctiva  Lung: CTAB, no rales, rhonchi or wheezing  CV: RRR, no murmurs, rubs or gallops  Abd: soft, +epigastric tenderness and LUQ tenderness reproducing pain of complaint, no rebound or guarding   MSK: No CVA tenderness. No edema, no visible deformities  Neuro: No focal neurologic deficits.   Skin: Warm and dry, no evidence of rash  Psych: normal mood and affect, A&Ox3    ATTENDING MD Marjorie: GEN: uncomfortable, AOx4; HEENT: NCAT, eyes clear, mucus membranes are dry, neck supple; CV: normal rate, regular rhythm, S1, S2, no murmurs, rubs, or gallops; RESP: lungs clear to auscultation bilaterally, equal breath sounds bilaterally; ABD: soft, mildly distended, epigastric and LUQ >LLQ tenderness, no rebound, no guarding, nonperintoneal. MSK: no CVAT. no back tenderness; SKIN: Warm, dry, PSYCH: appropriate affect, recatl exam declined

## 2018-01-20 NOTE — ED ADULT NURSE REASSESSMENT NOTE - NS ED NURSE REASSESS COMMENT FT1
Report received from MEAGAN Mariee. Pt. A+OX3, laying down in stretching resting comfortably. Breathing unlabored on RA. Skin warm pink and dry. Comfort and safety measures in place. TBDC.
patient remains stable while in the ED, asking for some more narcotics and refused PO Tylenol. MD made aware.
Patient remains stable while in the ED, pain subsided, tolerated PO fluids, pending MD re-eval and disposition.

## 2018-01-20 NOTE — ED PROVIDER NOTE - PROGRESS NOTE DETAILS
pain improved. awaiting CT. Labs with JAMAR, likely prerenal in setting of poor PO intake CT w/no acute findings except for large volume constipation. no concern for cardiac etiology. pt provided with GI regimen, referred for PCP f/u.

## 2018-01-20 NOTE — ED PROVIDER NOTE - ATTENDING CONTRIBUTION TO CARE
ATTENDING MD:  I, Lion Amador, personally have seen and examined this patient.  I have discussed all aspects of care with the resident physician. Resident note reviewed and agree on plan of care and except where noted.  See HPI, PE, and MDM for details.

## 2018-01-20 NOTE — ED PROVIDER NOTE - MEDICAL DECISION MAKING DETAILS
ATTENDING MD Marjorie: pt with post-prandial pain worse with eating, markedly tender in upper abdomen. radiates to back. most constitwnt with gastritis, PUD, pancreatitis. extremely low suspicion for aortic pathology or cardiac pathology. labs, apin control, CT, reeval.

## 2018-01-20 NOTE — ED PROVIDER NOTE - NS ED ROS FT
ROS: denies HA, weakness, dizziness, fevers/chills, vomiting, chest pain, SOB, diaphoresis, neck pain, dysuria/hematuria, or rash  +ab pain, nausea CONST: no fevers, no chills, no trauma  EYES: no pain, no visual disturbances  ENT: no sore throat, no epistaxis, no rhinorrhea, no hearing changes  CV: no chest pain, no palpitations, no orthopnea, no extremity pain or swelling  RESP: no shortness of breath, no cough, no sputum, no pleurisy, no wheezing  ABD: see HPI, no black or bloody stool  : no dysuria, no hematuria, no frequency, no urgency  MSK: see HPI, no neck pain, no extremity pain  NEURO: no headache, no sensory disturbances, no focal weakness, no dizziness  HEME: no easy bleeding or bruising  SKIN: no diaphoresis, no rash

## 2018-01-20 NOTE — ED PROVIDER NOTE - CARE PLAN
Principal Discharge DX:	Abdominal pain Principal Discharge DX:	Abdominal pain  Secondary Diagnosis:	Constipation  Secondary Diagnosis:	JAMAR (acute kidney injury)

## 2018-02-02 ENCOUNTER — APPOINTMENT (OUTPATIENT)
Dept: CARDIOLOGY | Facility: CLINIC | Age: 63
End: 2018-02-02
Payer: COMMERCIAL

## 2018-02-02 ENCOUNTER — NON-APPOINTMENT (OUTPATIENT)
Age: 63
End: 2018-02-02

## 2018-02-02 VITALS
HEART RATE: 54 BPM | WEIGHT: 167 LBS | BODY MASS INDEX: 26.84 KG/M2 | SYSTOLIC BLOOD PRESSURE: 120 MMHG | OXYGEN SATURATION: 96 % | DIASTOLIC BLOOD PRESSURE: 62 MMHG | HEIGHT: 66 IN | TEMPERATURE: 98.1 F

## 2018-02-02 PROCEDURE — 99214 OFFICE O/P EST MOD 30 MIN: CPT

## 2018-02-02 PROCEDURE — 93000 ELECTROCARDIOGRAM COMPLETE: CPT

## 2018-02-02 RX ORDER — CIPROFLOXACIN HYDROCHLORIDE 500 MG/1
500 TABLET, FILM COATED ORAL
Qty: 20 | Refills: 0 | Status: DISCONTINUED | COMMUNITY
Start: 2018-01-11

## 2018-02-12 ENCOUNTER — APPOINTMENT (OUTPATIENT)
Dept: UROLOGY | Facility: CLINIC | Age: 63
End: 2018-02-12
Payer: COMMERCIAL

## 2018-02-12 PROCEDURE — 99205 OFFICE O/P NEW HI 60 MIN: CPT

## 2018-02-12 PROCEDURE — 51798 US URINE CAPACITY MEASURE: CPT | Mod: 59

## 2018-02-12 PROCEDURE — 51741 ELECTRO-UROFLOWMETRY FIRST: CPT

## 2018-02-13 ENCOUNTER — MEDICATION RENEWAL (OUTPATIENT)
Age: 63
End: 2018-02-13

## 2018-02-13 LAB — PSA SERPL-MCNC: 1.09 NG/ML

## 2018-02-15 LAB — CORE LAB FLUID CYTOLOGY: NORMAL

## 2018-04-17 ENCOUNTER — MEDICATION RENEWAL (OUTPATIENT)
Age: 63
End: 2018-04-17

## 2018-05-04 ENCOUNTER — APPOINTMENT (OUTPATIENT)
Dept: CARDIOLOGY | Facility: CLINIC | Age: 63
End: 2018-05-04
Payer: COMMERCIAL

## 2018-05-04 ENCOUNTER — NON-APPOINTMENT (OUTPATIENT)
Age: 63
End: 2018-05-04

## 2018-05-04 VITALS
SYSTOLIC BLOOD PRESSURE: 136 MMHG | HEIGHT: 66 IN | TEMPERATURE: 98.1 F | OXYGEN SATURATION: 97 % | HEART RATE: 46 BPM | DIASTOLIC BLOOD PRESSURE: 84 MMHG | WEIGHT: 165 LBS | BODY MASS INDEX: 26.52 KG/M2

## 2018-05-04 PROCEDURE — 99215 OFFICE O/P EST HI 40 MIN: CPT

## 2018-05-04 PROCEDURE — 93000 ELECTROCARDIOGRAM COMPLETE: CPT

## 2018-05-08 ENCOUNTER — APPOINTMENT (OUTPATIENT)
Dept: CARDIOLOGY | Facility: CLINIC | Age: 63
End: 2018-05-08
Payer: COMMERCIAL

## 2018-05-08 VITALS
BODY MASS INDEX: 26.68 KG/M2 | HEIGHT: 66 IN | WEIGHT: 166 LBS | DIASTOLIC BLOOD PRESSURE: 72 MMHG | HEART RATE: 50 BPM | OXYGEN SATURATION: 96 % | SYSTOLIC BLOOD PRESSURE: 130 MMHG | TEMPERATURE: 97.5 F

## 2018-05-08 LAB
ALBUMIN SERPL ELPH-MCNC: 4.4 G/DL
ALP BLD-CCNC: 50 U/L
ALT SERPL-CCNC: 32 U/L
ANION GAP SERPL CALC-SCNC: 15 MMOL/L
AST SERPL-CCNC: 23 U/L
BASOPHILS # BLD AUTO: 0.04 K/UL
BASOPHILS NFR BLD AUTO: 0.7 %
BILIRUB SERPL-MCNC: 0.4 MG/DL
BUN SERPL-MCNC: 21 MG/DL
CALCIUM SERPL-MCNC: 9.7 MG/DL
CHLORIDE SERPL-SCNC: 101 MMOL/L
CO2 SERPL-SCNC: 26 MMOL/L
CREAT SERPL-MCNC: 1.11 MG/DL
CRP SERPL-MCNC: <0.2 MG/DL
DEPRECATED D DIMER PPP IA-ACNC: 158 NG/ML DDU
EOSINOPHIL # BLD AUTO: 0.51 K/UL
EOSINOPHIL NFR BLD AUTO: 8.8 %
GLUCOSE SERPL-MCNC: 98 MG/DL
HCT VFR BLD CALC: 52.6 %
HGB BLD-MCNC: 17.2 G/DL
IMM GRANULOCYTES NFR BLD AUTO: 0.2 %
LYMPHOCYTES # BLD AUTO: 1.09 K/UL
LYMPHOCYTES NFR BLD AUTO: 18.8 %
MAN DIFF?: NORMAL
MCHC RBC-ENTMCNC: 32 PG
MCHC RBC-ENTMCNC: 32.7 GM/DL
MCV RBC AUTO: 98 FL
MONOCYTES # BLD AUTO: 0.53 K/UL
MONOCYTES NFR BLD AUTO: 9.2 %
NEUTROPHILS # BLD AUTO: 3.61 K/UL
NEUTROPHILS NFR BLD AUTO: 62.3 %
PLATELET # BLD AUTO: 155 K/UL
POTASSIUM SERPL-SCNC: 4.9 MMOL/L
PROT SERPL-MCNC: 6.5 G/DL
RBC # BLD: 5.37 M/UL
RBC # FLD: 13.3 %
SODIUM SERPL-SCNC: 142 MMOL/L
URATE SERPL-MCNC: 6.8 MG/DL
WBC # FLD AUTO: 5.79 K/UL

## 2018-05-08 PROCEDURE — 99213 OFFICE O/P EST LOW 20 MIN: CPT

## 2018-05-08 PROCEDURE — 93306 TTE W/DOPPLER COMPLETE: CPT

## 2018-05-09 ENCOUNTER — APPOINTMENT (OUTPATIENT)
Dept: RHEUMATOLOGY | Facility: CLINIC | Age: 63
End: 2018-05-09
Payer: COMMERCIAL

## 2018-05-09 VITALS
OXYGEN SATURATION: 95 % | HEIGHT: 66 IN | TEMPERATURE: 99.2 F | SYSTOLIC BLOOD PRESSURE: 145 MMHG | HEART RATE: 52 BPM | BODY MASS INDEX: 26.36 KG/M2 | DIASTOLIC BLOOD PRESSURE: 86 MMHG | WEIGHT: 164 LBS

## 2018-05-09 DIAGNOSIS — R60.0 LOCALIZED EDEMA: ICD-10-CM

## 2018-05-09 DIAGNOSIS — M77.9 ENTHESOPATHY, UNSPECIFIED: ICD-10-CM

## 2018-05-09 DIAGNOSIS — M25.579 PAIN IN UNSPECIFIED ANKLE AND JOINTS OF UNSPECIFIED FOOT: ICD-10-CM

## 2018-05-09 DIAGNOSIS — L98.8 OTHER SPECIFIED DISORDERS OF THE SKIN AND SUBCUTANEOUS TISSUE: ICD-10-CM

## 2018-05-09 DIAGNOSIS — M54.5 LOW BACK PAIN: ICD-10-CM

## 2018-05-09 DIAGNOSIS — H30.141: ICD-10-CM

## 2018-05-09 PROCEDURE — 99214 OFFICE O/P EST MOD 30 MIN: CPT

## 2018-05-10 ENCOUNTER — APPOINTMENT (OUTPATIENT)
Dept: CARDIOLOGY | Facility: CLINIC | Age: 63
End: 2018-05-10
Payer: COMMERCIAL

## 2018-05-10 PROBLEM — L98.8 SKIN PLAQUE: Status: ACTIVE | Noted: 2018-05-10

## 2018-05-10 PROBLEM — M25.579 ANKLE PAIN: Status: ACTIVE | Noted: 2018-05-08

## 2018-05-10 PROBLEM — M77.9: Status: ACTIVE | Noted: 2018-05-10

## 2018-05-10 PROBLEM — H30.141: Status: ACTIVE | Noted: 2018-05-10

## 2018-05-10 PROBLEM — M54.5 LOWER BACK PAIN: Status: ACTIVE | Noted: 2018-05-10

## 2018-05-10 PROBLEM — R60.0 EDEMA OF RIGHT LOWER EXTREMITY: Status: ACTIVE | Noted: 2018-05-10

## 2018-05-10 PROCEDURE — A9500: CPT

## 2018-05-10 PROCEDURE — 93015 CV STRESS TEST SUPVJ I&R: CPT

## 2018-05-10 PROCEDURE — 78452 HT MUSCLE IMAGE SPECT MULT: CPT

## 2018-05-14 ENCOUNTER — FORM ENCOUNTER (OUTPATIENT)
Age: 63
End: 2018-05-14

## 2018-05-15 ENCOUNTER — APPOINTMENT (OUTPATIENT)
Dept: ULTRASOUND IMAGING | Facility: CLINIC | Age: 63
End: 2018-05-15
Payer: COMMERCIAL

## 2018-05-15 ENCOUNTER — APPOINTMENT (OUTPATIENT)
Dept: RADIOLOGY | Facility: CLINIC | Age: 63
End: 2018-05-15
Payer: COMMERCIAL

## 2018-05-15 ENCOUNTER — OUTPATIENT (OUTPATIENT)
Dept: OUTPATIENT SERVICES | Facility: HOSPITAL | Age: 63
LOS: 1 days | End: 2018-05-15
Payer: COMMERCIAL

## 2018-05-15 DIAGNOSIS — D86.9 SARCOIDOSIS, UNSPECIFIED: ICD-10-CM

## 2018-05-15 DIAGNOSIS — M25.579 PAIN IN UNSPECIFIED ANKLE AND JOINTS OF UNSPECIFIED FOOT: ICD-10-CM

## 2018-05-15 DIAGNOSIS — Z98.89 OTHER SPECIFIED POSTPROCEDURAL STATES: Chronic | ICD-10-CM

## 2018-05-15 PROCEDURE — 73610 X-RAY EXAM OF ANKLE: CPT

## 2018-05-15 PROCEDURE — 73610 X-RAY EXAM OF ANKLE: CPT | Mod: 26,RT

## 2018-05-16 ENCOUNTER — RESULT REVIEW (OUTPATIENT)
Age: 63
End: 2018-05-16

## 2018-05-20 ENCOUNTER — FORM ENCOUNTER (OUTPATIENT)
Age: 63
End: 2018-05-20

## 2018-05-21 ENCOUNTER — OUTPATIENT (OUTPATIENT)
Dept: OUTPATIENT SERVICES | Facility: HOSPITAL | Age: 63
LOS: 1 days | End: 2018-05-21
Payer: COMMERCIAL

## 2018-05-21 ENCOUNTER — APPOINTMENT (OUTPATIENT)
Dept: MRI IMAGING | Facility: CLINIC | Age: 63
End: 2018-05-21
Payer: COMMERCIAL

## 2018-05-21 DIAGNOSIS — Z98.89 OTHER SPECIFIED POSTPROCEDURAL STATES: Chronic | ICD-10-CM

## 2018-05-21 DIAGNOSIS — Z00.8 ENCOUNTER FOR OTHER GENERAL EXAMINATION: ICD-10-CM

## 2018-05-21 PROCEDURE — 73721 MRI JNT OF LWR EXTRE W/O DYE: CPT

## 2018-05-21 PROCEDURE — 73721 MRI JNT OF LWR EXTRE W/O DYE: CPT | Mod: 26,RT

## 2018-05-22 ENCOUNTER — APPOINTMENT (OUTPATIENT)
Dept: PULMONOLOGY | Facility: CLINIC | Age: 63
End: 2018-05-22
Payer: COMMERCIAL

## 2018-05-22 VITALS
SYSTOLIC BLOOD PRESSURE: 110 MMHG | HEART RATE: 72 BPM | OXYGEN SATURATION: 93 % | HEIGHT: 66 IN | WEIGHT: 166 LBS | BODY MASS INDEX: 26.68 KG/M2 | DIASTOLIC BLOOD PRESSURE: 70 MMHG

## 2018-05-22 PROCEDURE — 71046 X-RAY EXAM CHEST 2 VIEWS: CPT

## 2018-05-22 PROCEDURE — 99214 OFFICE O/P EST MOD 30 MIN: CPT | Mod: 25

## 2018-05-29 ENCOUNTER — APPOINTMENT (OUTPATIENT)
Dept: PULMONOLOGY | Facility: CLINIC | Age: 63
End: 2018-05-29

## 2018-09-12 ENCOUNTER — MESSAGE (OUTPATIENT)
Age: 63
End: 2018-09-12

## 2018-09-12 ENCOUNTER — APPOINTMENT (OUTPATIENT)
Dept: PULMONOLOGY | Facility: CLINIC | Age: 63
End: 2018-09-12

## 2018-09-14 ENCOUNTER — APPOINTMENT (OUTPATIENT)
Dept: UROLOGY | Facility: CLINIC | Age: 63
End: 2018-09-14
Payer: COMMERCIAL

## 2018-09-14 VITALS
RESPIRATION RATE: 16 BRPM | HEART RATE: 52 BPM | TEMPERATURE: 98.7 F | SYSTOLIC BLOOD PRESSURE: 134 MMHG | DIASTOLIC BLOOD PRESSURE: 81 MMHG

## 2018-09-14 PROCEDURE — 99215 OFFICE O/P EST HI 40 MIN: CPT | Mod: 25

## 2018-09-14 PROCEDURE — 51798 US URINE CAPACITY MEASURE: CPT

## 2018-09-14 RX ORDER — PAROXETINE HYDROCHLORIDE 10 MG/1
10 TABLET, FILM COATED ORAL
Qty: 30 | Refills: 2 | Status: DISCONTINUED | COMMUNITY
Start: 2017-11-16 | End: 2018-09-14

## 2018-09-14 RX ORDER — MELOXICAM 7.5 MG/1
7.5 TABLET ORAL
Qty: 30 | Refills: 1 | Status: DISCONTINUED | COMMUNITY
Start: 2018-05-09 | End: 2018-09-14

## 2018-09-14 RX ORDER — DULOXETINE HYDROCHLORIDE 30 MG/1
30 CAPSULE, DELAYED RELEASE PELLETS ORAL
Qty: 90 | Refills: 0 | Status: DISCONTINUED | COMMUNITY
Start: 2018-05-09 | End: 2018-09-14

## 2018-09-14 RX ORDER — MONTELUKAST 10 MG/1
10 TABLET, FILM COATED ORAL
Qty: 1 | Refills: 1 | Status: DISCONTINUED | COMMUNITY
Start: 2018-05-22 | End: 2018-09-14

## 2018-09-17 LAB
APPEARANCE: CLEAR
BACTERIA UR CULT: NORMAL
BACTERIA: NEGATIVE
BILIRUBIN URINE: NEGATIVE
BLOOD URINE: ABNORMAL
COLOR: YELLOW
GLUCOSE QUALITATIVE U: NEGATIVE MG/DL
KETONES URINE: NEGATIVE
LEUKOCYTE ESTERASE URINE: NEGATIVE
MICROSCOPIC-UA: NORMAL
NITRITE URINE: NEGATIVE
PH URINE: 6
PROTEIN URINE: NEGATIVE MG/DL
RED BLOOD CELLS URINE: 2 /HPF
SPECIFIC GRAVITY URINE: 1.02
SQUAMOUS EPITHELIAL CELLS: 0 /HPF
UROBILINOGEN URINE: NEGATIVE MG/DL
WHITE BLOOD CELLS URINE: 1 /HPF

## 2018-10-10 ENCOUNTER — APPOINTMENT (OUTPATIENT)
Dept: UROLOGY | Facility: CLINIC | Age: 63
End: 2018-10-10

## 2018-10-25 ENCOUNTER — APPOINTMENT (OUTPATIENT)
Dept: CARDIOLOGY | Facility: CLINIC | Age: 63
End: 2018-10-25
Payer: COMMERCIAL

## 2018-10-25 ENCOUNTER — NON-APPOINTMENT (OUTPATIENT)
Age: 63
End: 2018-10-25

## 2018-10-25 VITALS
DIASTOLIC BLOOD PRESSURE: 86 MMHG | OXYGEN SATURATION: 94 % | HEART RATE: 43 BPM | WEIGHT: 166 LBS | BODY MASS INDEX: 26.79 KG/M2 | SYSTOLIC BLOOD PRESSURE: 150 MMHG

## 2018-10-25 PROCEDURE — 99215 OFFICE O/P EST HI 40 MIN: CPT

## 2018-10-25 PROCEDURE — 93000 ELECTROCARDIOGRAM COMPLETE: CPT

## 2018-10-26 ENCOUNTER — APPOINTMENT (OUTPATIENT)
Dept: PULMONOLOGY | Facility: CLINIC | Age: 63
End: 2018-10-26
Payer: COMMERCIAL

## 2018-10-26 ENCOUNTER — NON-APPOINTMENT (OUTPATIENT)
Age: 63
End: 2018-10-26

## 2018-10-26 ENCOUNTER — MEDICATION RENEWAL (OUTPATIENT)
Age: 63
End: 2018-10-26

## 2018-10-26 VITALS
RESPIRATION RATE: 17 BRPM | OXYGEN SATURATION: 98 % | DIASTOLIC BLOOD PRESSURE: 78 MMHG | HEART RATE: 58 BPM | SYSTOLIC BLOOD PRESSURE: 120 MMHG | BODY MASS INDEX: 26.68 KG/M2 | HEIGHT: 66 IN | WEIGHT: 166 LBS

## 2018-10-26 DIAGNOSIS — Z23 ENCOUNTER FOR IMMUNIZATION: ICD-10-CM

## 2018-10-26 PROCEDURE — 94010 BREATHING CAPACITY TEST: CPT

## 2018-10-26 PROCEDURE — 90686 IIV4 VACC NO PRSV 0.5 ML IM: CPT

## 2018-10-26 PROCEDURE — 99214 OFFICE O/P EST MOD 30 MIN: CPT | Mod: 25

## 2018-10-26 PROCEDURE — G0008: CPT

## 2018-10-29 LAB
ALBUMIN SERPL ELPH-MCNC: 4.7 G/DL
ALP BLD-CCNC: 53 U/L
ALT SERPL-CCNC: 33 U/L
ANION GAP SERPL CALC-SCNC: 14 MMOL/L
AST SERPL-CCNC: 28 U/L
BASOPHILS # BLD AUTO: 0.03 K/UL
BASOPHILS NFR BLD AUTO: 0.5 %
BILIRUB SERPL-MCNC: 0.5 MG/DL
BUN SERPL-MCNC: 21 MG/DL
CALCIUM SERPL-MCNC: 9.7 MG/DL
CHLORIDE SERPL-SCNC: 101 MMOL/L
CHOLEST SERPL-MCNC: 204 MG/DL
CHOLEST/HDLC SERPL: 3.1 RATIO
CK SERPL-CCNC: 360 U/L
CO2 SERPL-SCNC: 26 MMOL/L
CREAT SERPL-MCNC: 1.03 MG/DL
EOSINOPHIL # BLD AUTO: 0.46 K/UL
EOSINOPHIL NFR BLD AUTO: 7.6 %
GLUCOSE SERPL-MCNC: 69 MG/DL
HCT VFR BLD CALC: 47.9 %
HDLC SERPL-MCNC: 65 MG/DL
HGB BLD-MCNC: 16 G/DL
IMM GRANULOCYTES NFR BLD AUTO: 0.2 %
LDLC SERPL CALC-MCNC: 116 MG/DL
LYMPHOCYTES # BLD AUTO: 1.52 K/UL
LYMPHOCYTES NFR BLD AUTO: 25 %
MAN DIFF?: NORMAL
MCHC RBC-ENTMCNC: 30.8 PG
MCHC RBC-ENTMCNC: 33.4 GM/DL
MCV RBC AUTO: 92.1 FL
MONOCYTES # BLD AUTO: 0.4 K/UL
MONOCYTES NFR BLD AUTO: 6.6 %
NEUTROPHILS # BLD AUTO: 3.67 K/UL
NEUTROPHILS NFR BLD AUTO: 60.1 %
PLATELET # BLD AUTO: 195 K/UL
POTASSIUM SERPL-SCNC: 5.5 MMOL/L
PROT SERPL-MCNC: 7.1 G/DL
RBC # BLD: 5.2 M/UL
RBC # FLD: 13.4 %
SODIUM SERPL-SCNC: 142 MMOL/L
TRIGL SERPL-MCNC: 116 MG/DL
WBC # FLD AUTO: 6.09 K/UL

## 2018-10-30 LAB — ACE BLD-CCNC: 20 U/L

## 2018-11-02 ENCOUNTER — APPOINTMENT (OUTPATIENT)
Dept: UROLOGY | Facility: CLINIC | Age: 63
End: 2018-11-02

## 2018-11-07 ENCOUNTER — APPOINTMENT (OUTPATIENT)
Dept: UROLOGY | Facility: CLINIC | Age: 63
End: 2018-11-07
Payer: COMMERCIAL

## 2018-11-07 PROCEDURE — 99213 OFFICE O/P EST LOW 20 MIN: CPT

## 2018-11-07 RX ORDER — ALFUZOSIN HYDROCHLORIDE 10 MG/1
10 TABLET, EXTENDED RELEASE ORAL
Qty: 30 | Refills: 3 | Status: DISCONTINUED | COMMUNITY
Start: 2018-02-12 | End: 2018-11-07

## 2018-11-08 LAB
HSV 1+2 IGG SER IA-IMP: NEGATIVE
HSV 1+2 IGG SER IA-IMP: NEGATIVE
HSV1 IGG SER QL: 0.36 INDEX
HSV2 IGG SER QL: 0.88 INDEX

## 2018-11-10 LAB
HSV1 IGM SER QL: NORMAL TITER
HSV2 AB FLD-ACNC: NORMAL TITER

## 2018-11-19 LAB — POTASSIUM SERPL-SCNC: 4.9 MMOL/L

## 2018-11-29 ENCOUNTER — LABORATORY RESULT (OUTPATIENT)
Age: 63
End: 2018-11-29

## 2018-11-29 ENCOUNTER — APPOINTMENT (OUTPATIENT)
Dept: UROLOGY | Facility: CLINIC | Age: 63
End: 2018-11-29
Payer: COMMERCIAL

## 2018-11-29 DIAGNOSIS — N48.9 DISORDER OF PENIS, UNSPECIFIED: ICD-10-CM

## 2018-11-29 PROCEDURE — 99215 OFFICE O/P EST HI 40 MIN: CPT

## 2018-11-30 ENCOUNTER — RX RENEWAL (OUTPATIENT)
Age: 63
End: 2018-11-30

## 2018-11-30 LAB
APPEARANCE: CLEAR
BACTERIA: NEGATIVE
BASOPHILS # BLD AUTO: 0.02 K/UL
BASOPHILS NFR BLD AUTO: 0.4 %
BILIRUBIN URINE: NEGATIVE
BLOOD URINE: NEGATIVE
C TRACH RRNA SPEC QL NAA+PROBE: NOT DETECTED
COLOR: YELLOW
EOSINOPHIL # BLD AUTO: 0.34 K/UL
EOSINOPHIL NFR BLD AUTO: 6.7 %
GLUCOSE QUALITATIVE U: NEGATIVE MG/DL
HCT VFR BLD CALC: 49.7 %
HGB BLD-MCNC: 16.2 G/DL
HYALINE CASTS: 1 /LPF
IMM GRANULOCYTES NFR BLD AUTO: 0 %
KETONES URINE: NEGATIVE
LEUKOCYTE ESTERASE URINE: ABNORMAL
LYMPHOCYTES # BLD AUTO: 0.89 K/UL
LYMPHOCYTES NFR BLD AUTO: 17.7 %
MAN DIFF?: NORMAL
MCHC RBC-ENTMCNC: 31.1 PG
MCHC RBC-ENTMCNC: 32.6 GM/DL
MCV RBC AUTO: 95.4 FL
MICROSCOPIC-UA: NORMAL
MONOCYTES # BLD AUTO: 0.32 K/UL
MONOCYTES NFR BLD AUTO: 6.3 %
N GONORRHOEA RRNA SPEC QL NAA+PROBE: NOT DETECTED
NEUTROPHILS # BLD AUTO: 3.47 K/UL
NEUTROPHILS NFR BLD AUTO: 68.9 %
NITRITE URINE: NEGATIVE
PH URINE: 5.5
PLATELET # BLD AUTO: 164 K/UL
PROTEIN URINE: NEGATIVE MG/DL
RBC # BLD: 5.21 M/UL
RBC # FLD: 13.7 %
RED BLOOD CELLS URINE: 1 /HPF
SOURCE AMPLIFICATION: NORMAL
SPECIFIC GRAVITY URINE: 1.02
SQUAMOUS EPITHELIAL CELLS: 0 /HPF
T PALLIDUM AB SER QL IA: NEGATIVE
UROBILINOGEN URINE: NEGATIVE MG/DL
WBC # FLD AUTO: 5.04 K/UL
WHITE BLOOD CELLS URINE: 2 /HPF

## 2018-12-03 LAB — BACTERIA UR CULT: NORMAL

## 2019-01-09 ENCOUNTER — APPOINTMENT (OUTPATIENT)
Dept: UROLOGY | Facility: CLINIC | Age: 64
End: 2019-01-09

## 2019-01-12 ENCOUNTER — EMERGENCY (EMERGENCY)
Facility: HOSPITAL | Age: 64
LOS: 1 days | Discharge: HOME | End: 2019-01-12
Attending: EMERGENCY MEDICINE
Payer: MEDICARE

## 2019-01-12 VITALS
HEART RATE: 74 BPM | SYSTOLIC BLOOD PRESSURE: 116 MMHG | DIASTOLIC BLOOD PRESSURE: 54 MMHG | RESPIRATION RATE: 16 BRPM | OXYGEN SATURATION: 99 %

## 2019-01-12 VITALS
DIASTOLIC BLOOD PRESSURE: 72 MMHG | RESPIRATION RATE: 18 BRPM | HEART RATE: 58 BPM | OXYGEN SATURATION: 99 % | SYSTOLIC BLOOD PRESSURE: 131 MMHG | TEMPERATURE: 98 F

## 2019-01-12 DIAGNOSIS — Z98.89 OTHER SPECIFIED POSTPROCEDURAL STATES: Chronic | ICD-10-CM

## 2019-01-12 LAB
ALBUMIN SERPL ELPH-MCNC: 4.3 G/DL — SIGNIFICANT CHANGE UP (ref 3.3–5)
ALP SERPL-CCNC: 55 U/L — SIGNIFICANT CHANGE UP (ref 40–120)
ALT FLD-CCNC: 33 U/L — SIGNIFICANT CHANGE UP (ref 10–45)
ANION GAP SERPL CALC-SCNC: 11 MMOL/L — SIGNIFICANT CHANGE UP (ref 5–17)
APPEARANCE UR: CLEAR — SIGNIFICANT CHANGE UP
AST SERPL-CCNC: 28 U/L — SIGNIFICANT CHANGE UP (ref 10–40)
BACTERIA # UR AUTO: NEGATIVE — SIGNIFICANT CHANGE UP
BASOPHILS # BLD AUTO: 0 K/UL — SIGNIFICANT CHANGE UP (ref 0–0.2)
BASOPHILS NFR BLD AUTO: 0.4 % — SIGNIFICANT CHANGE UP (ref 0–2)
BILIRUB SERPL-MCNC: 0.5 MG/DL — SIGNIFICANT CHANGE UP (ref 0.2–1.2)
BILIRUB UR-MCNC: NEGATIVE — SIGNIFICANT CHANGE UP
BUN SERPL-MCNC: 23 MG/DL — SIGNIFICANT CHANGE UP (ref 7–23)
CALCIUM SERPL-MCNC: 9.2 MG/DL — SIGNIFICANT CHANGE UP (ref 8.4–10.5)
CHLORIDE SERPL-SCNC: 103 MMOL/L — SIGNIFICANT CHANGE UP (ref 96–108)
CO2 SERPL-SCNC: 25 MMOL/L — SIGNIFICANT CHANGE UP (ref 22–31)
COLOR SPEC: YELLOW — SIGNIFICANT CHANGE UP
CREAT SERPL-MCNC: 1 MG/DL — SIGNIFICANT CHANGE UP (ref 0.5–1.3)
DIFF PNL FLD: ABNORMAL
EOSINOPHIL # BLD AUTO: 0.3 K/UL — SIGNIFICANT CHANGE UP (ref 0–0.5)
EOSINOPHIL NFR BLD AUTO: 4.1 % — SIGNIFICANT CHANGE UP (ref 0–6)
EPI CELLS # UR: 1 /HPF — SIGNIFICANT CHANGE UP
GLUCOSE SERPL-MCNC: 151 MG/DL — HIGH (ref 70–99)
GLUCOSE UR QL: NEGATIVE — SIGNIFICANT CHANGE UP
HCT VFR BLD CALC: 49 % — SIGNIFICANT CHANGE UP (ref 39–50)
HGB BLD-MCNC: 16.5 G/DL — SIGNIFICANT CHANGE UP (ref 13–17)
HYALINE CASTS # UR AUTO: 0 /LPF — SIGNIFICANT CHANGE UP (ref 0–2)
KETONES UR-MCNC: NEGATIVE — SIGNIFICANT CHANGE UP
LEUKOCYTE ESTERASE UR-ACNC: NEGATIVE — SIGNIFICANT CHANGE UP
LYMPHOCYTES # BLD AUTO: 1 K/UL — SIGNIFICANT CHANGE UP (ref 1–3.3)
LYMPHOCYTES # BLD AUTO: 14.4 % — SIGNIFICANT CHANGE UP (ref 13–44)
MCHC RBC-ENTMCNC: 31.6 PG — SIGNIFICANT CHANGE UP (ref 27–34)
MCHC RBC-ENTMCNC: 33.6 GM/DL — SIGNIFICANT CHANGE UP (ref 32–36)
MCV RBC AUTO: 94.1 FL — SIGNIFICANT CHANGE UP (ref 80–100)
MONOCYTES # BLD AUTO: 0.4 K/UL — SIGNIFICANT CHANGE UP (ref 0–0.9)
MONOCYTES NFR BLD AUTO: 5 % — SIGNIFICANT CHANGE UP (ref 2–14)
NEUTROPHILS # BLD AUTO: 5.4 K/UL — SIGNIFICANT CHANGE UP (ref 1.8–7.4)
NEUTROPHILS NFR BLD AUTO: 76.1 % — SIGNIFICANT CHANGE UP (ref 43–77)
NITRITE UR-MCNC: NEGATIVE — SIGNIFICANT CHANGE UP
PH UR: 6 — SIGNIFICANT CHANGE UP (ref 5–8)
PLATELET # BLD AUTO: 134 K/UL — LOW (ref 150–400)
POTASSIUM SERPL-MCNC: 4.7 MMOL/L — SIGNIFICANT CHANGE UP (ref 3.5–5.3)
POTASSIUM SERPL-SCNC: 4.7 MMOL/L — SIGNIFICANT CHANGE UP (ref 3.5–5.3)
PROT SERPL-MCNC: 6.5 G/DL — SIGNIFICANT CHANGE UP (ref 6–8.3)
PROT UR-MCNC: ABNORMAL
RBC # BLD: 5.2 M/UL — SIGNIFICANT CHANGE UP (ref 4.2–5.8)
RBC # FLD: 11.7 % — SIGNIFICANT CHANGE UP (ref 10.3–14.5)
RBC CASTS # UR COMP ASSIST: 5 /HPF — HIGH (ref 0–4)
SODIUM SERPL-SCNC: 139 MMOL/L — SIGNIFICANT CHANGE UP (ref 135–145)
SP GR SPEC: 1.02 — SIGNIFICANT CHANGE UP (ref 1.01–1.02)
TROPONIN T, HIGH SENSITIVITY RESULT: 22 NG/L — SIGNIFICANT CHANGE UP (ref 0–51)
UROBILINOGEN FLD QL: NEGATIVE — SIGNIFICANT CHANGE UP
WBC # BLD: 7.1 K/UL — SIGNIFICANT CHANGE UP (ref 3.8–10.5)
WBC # FLD AUTO: 7.1 K/UL — SIGNIFICANT CHANGE UP (ref 3.8–10.5)
WBC UR QL: 3 /HPF — SIGNIFICANT CHANGE UP (ref 0–5)

## 2019-01-12 PROCEDURE — 80053 COMPREHEN METABOLIC PANEL: CPT

## 2019-01-12 PROCEDURE — 71046 X-RAY EXAM CHEST 2 VIEWS: CPT | Mod: 26

## 2019-01-12 PROCEDURE — 71275 CT ANGIOGRAPHY CHEST: CPT

## 2019-01-12 PROCEDURE — 85027 COMPLETE CBC AUTOMATED: CPT

## 2019-01-12 PROCEDURE — 81001 URINALYSIS AUTO W/SCOPE: CPT

## 2019-01-12 PROCEDURE — 82962 GLUCOSE BLOOD TEST: CPT

## 2019-01-12 PROCEDURE — 71275 CT ANGIOGRAPHY CHEST: CPT | Mod: 26

## 2019-01-12 PROCEDURE — 74174 CTA ABD&PLVS W/CONTRAST: CPT

## 2019-01-12 PROCEDURE — 93005 ELECTROCARDIOGRAM TRACING: CPT

## 2019-01-12 PROCEDURE — 84484 ASSAY OF TROPONIN QUANT: CPT

## 2019-01-12 PROCEDURE — 71046 X-RAY EXAM CHEST 2 VIEWS: CPT

## 2019-01-12 PROCEDURE — 93010 ELECTROCARDIOGRAM REPORT: CPT | Mod: GC

## 2019-01-12 PROCEDURE — 99284 EMERGENCY DEPT VISIT MOD MDM: CPT | Mod: GC,25

## 2019-01-12 PROCEDURE — 74174 CTA ABD&PLVS W/CONTRAST: CPT | Mod: 26

## 2019-01-12 PROCEDURE — 83690 ASSAY OF LIPASE: CPT

## 2019-01-12 PROCEDURE — 99284 EMERGENCY DEPT VISIT MOD MDM: CPT | Mod: 25

## 2019-01-12 NOTE — ED ADULT NURSE REASSESSMENT NOTE - NS ED NURSE REASSESS COMMENT FT1
Pt a&o x3, resting on stretcher, in no acute distress, vital signs stable. Pt reports feeling well enough to return home. Okay to d/c as per Dr Rios.

## 2019-01-12 NOTE — ED PROVIDER NOTE - MEDICAL DECISION MAKING DETAILS
Ramiro Rios (Resident): 62 y/o male p/w syncope w/ CP and nausea - states in past from low BP - vitals WNL now - given CP and history, concern for cardiac event/ACS - will check trops, labs, CXR - also still having diffuse bad pain, left upper quadrant and right upper quadrant - will obtain CT to r/o any intraabdominal pathology - low concern for PE given no risk factors, no leg swelling, no tachycardia or SOB/hypoxia Ramiro Rios (Resident): 62 y/o male p/w syncope w/ CP and nausea - states in past from low BP - vitals WNL now - given CP and history, concern for cardiac event/ACS - will check trops, labs, CXR - also still having diffuse bad pain, left upper quadrant and right upper quadrant - will obtain CTA to r/o Dissection given pain in chest and abd, as well as to r/o any intraabdominal pathology vs AAA, although quick bedside US negative for any obvious AAA and prior ct showed no AAA - low concern for PE given no risk factors, no leg swelling, no tachycardia or SOB/hypoxia Ramiro Rios (Resident): 64 y/o male p/w syncope w/ CP and nausea - states in past from low BP - vitals WNL now - given CP and history, concern for cardiac event/ACS - will check trops, labs, CXR - also still having diffuse bad pain, left upper quadrant and right upper quadrant - will obtain CTA to r/o Dissection given pain in chest and abd, as well as to r/o any intraabdominal pathology vs AAA, prior ct showed no AAA - low concern for PE given no risk factors, no leg swelling, no tachycardia or SOB/hypoxia

## 2019-01-12 NOTE — ED PROVIDER NOTE - ATTENDING CONTRIBUTION TO CARE
pain above / below diaphragm/ syncope on toilet. has happened before.  nad, rrr, +ttp upper abdomen.   concern for direction / aaa unlikely given nl past exams. labs / xr chest / ekg

## 2019-01-12 NOTE — ED PROVIDER NOTE - NSFOLLOWUPINSTRUCTIONS_ED_ALL_ED_FT
1) Please return to the ED should you have any new or worsening symptoms, worsening pain, develop chest pain, syncope/pass out, or any concerning symptoms  2) Please follow up with your primary care doctor in 2-3 days. Please also follow up with your cardiologist. Give him/her a copy of your results

## 2019-01-12 NOTE — ED PROVIDER NOTE - OBJECTIVE STATEMENT
62 y/o male hx of HTN, CAD w/ stents, HLD p/w syncope. BIBA. Per patient, had some "sternum pain," which he occasionally gets. States got nauseous. When this happens, he often needs to move his bowels and then feels better. Got to toilet, sat down, got nauseous, lightheaded, and had syncopal event. Found by wife on toilet. States he had diarrhea at this time, no blood. No hx of AAA in the past. Never a smoker. Has had syncope before, last episode over 1 year ago. Currently has some mild CP. States he has a known RBBB. No fevers, cough, diff breathing. No leg pain/swelling. No hx of DVT or PE.     Cards: Joel Lisker 62 y/o male hx of HTN, CAD w/ stents, HLD p/w syncope. BIBA. Per patient, had some "sternum pain," which he occasionally gets. States got nauseous. When this happens, he often needs to move his bowels and then feels better. Got to toilet, sat down, got nauseous, lightheaded, and had syncopal event. Found by wife on toilet. States he had diarrhea at this time, no blood. No hx of AAA in the past. Never a smoker. Has had syncope before, last episode over 1 year ago. Currently has some mild CP. States he has a known RBBB. No fevers, cough, diff breathing. No leg pain/swelling. No hx of DVT or PE.     Cards: J Lisker

## 2019-01-12 NOTE — ED PROVIDER NOTE - PROGRESS NOTE DETAILS
Ramiro Rios (Resident): CT negative, trops negative - patient had syncopal event, d/w him needs cards follow up, but patient states had a recent echo - d/w patient return precautions, including CP, SOB, or any concerning symptoms - provided patient a copy of reports, safe to d/c home

## 2019-01-15 ENCOUNTER — NON-APPOINTMENT (OUTPATIENT)
Age: 64
End: 2019-01-15

## 2019-01-15 ENCOUNTER — APPOINTMENT (OUTPATIENT)
Dept: CARDIOLOGY | Facility: CLINIC | Age: 64
End: 2019-01-15
Payer: MEDICARE

## 2019-01-15 VITALS
WEIGHT: 167 LBS | HEART RATE: 56 BPM | SYSTOLIC BLOOD PRESSURE: 120 MMHG | OXYGEN SATURATION: 97 % | DIASTOLIC BLOOD PRESSURE: 70 MMHG | BODY MASS INDEX: 26.95 KG/M2

## 2019-01-15 DIAGNOSIS — R55 SYNCOPE AND COLLAPSE: ICD-10-CM

## 2019-01-15 PROCEDURE — 99215 OFFICE O/P EST HI 40 MIN: CPT

## 2019-01-15 PROCEDURE — 93000 ELECTROCARDIOGRAM COMPLETE: CPT

## 2019-01-15 NOTE — DISCUSSION/SUMMARY
[FreeTextEntry1] : 63 year old with With coronary disease and good LDL response in the past who presents with recent nausea mediated vasovagal syncope.\par Doubt arrhythmia as a source.\par Repeat cardiac MRI.\par Loop recorder by EP.\par \par No changes to his cardiac medications are suggested.\par Discussed abortive maneuvers.

## 2019-01-15 NOTE — HISTORY OF PRESENT ILLNESS
[FreeTextEntry1] : This past sat morning. nausea, syncopal episode lasting a few seconds while sitting up. small loose bowel movement. HR felt fast.\par to the ER. No MI\par CT chest showed no significant new abnormality.\par Still feels washed-out and nauseated.\par Similar in prodrome to prior syncope.\par \par

## 2019-01-15 NOTE — PHYSICAL EXAM
[General Appearance - Well Developed] : well developed [General Appearance - Well Nourished] : well nourished [Normal Conjunctiva] : the conjunctiva exhibited no abnormalities [No Oral Pallor] : no oral pallor [Normal Jugular Venous V Waves Present] : normal jugular venous V waves present [Respiration, Rhythm And Depth] : normal respiratory rhythm and effort [Auscultation Breath Sounds / Voice Sounds] : lungs were clear to auscultation bilaterally [Heart Sounds] : normal S1 and S2 [Murmurs] : no murmurs present [Abdomen Soft] : soft [Abnormal Walk] : normal gait [Cyanosis, Localized] : no localized cyanosis [Skin Turgor] : normal skin turgor [Oriented To Time, Place, And Person] : oriented to person, place, and time [Affect] : the affect was normal

## 2019-01-16 ENCOUNTER — MEDICATION RENEWAL (OUTPATIENT)
Age: 64
End: 2019-01-16

## 2019-01-20 ENCOUNTER — FORM ENCOUNTER (OUTPATIENT)
Age: 64
End: 2019-01-20

## 2019-01-21 ENCOUNTER — OUTPATIENT (OUTPATIENT)
Dept: OUTPATIENT SERVICES | Facility: HOSPITAL | Age: 64
LOS: 1 days | End: 2019-01-21
Payer: MEDICARE

## 2019-01-21 ENCOUNTER — APPOINTMENT (OUTPATIENT)
Dept: MRI IMAGING | Facility: CLINIC | Age: 64
End: 2019-01-21

## 2019-01-21 DIAGNOSIS — Z00.8 ENCOUNTER FOR OTHER GENERAL EXAMINATION: ICD-10-CM

## 2019-01-21 DIAGNOSIS — Z98.89 OTHER SPECIFIED POSTPROCEDURAL STATES: Chronic | ICD-10-CM

## 2019-01-21 PROCEDURE — 75561 CARDIAC MRI FOR MORPH W/DYE: CPT

## 2019-01-21 PROCEDURE — A9585: CPT

## 2019-01-21 PROCEDURE — 75561 CARDIAC MRI FOR MORPH W/DYE: CPT | Mod: 26

## 2019-01-29 ENCOUNTER — APPOINTMENT (OUTPATIENT)
Dept: MRI IMAGING | Facility: HOSPITAL | Age: 64
End: 2019-01-29

## 2019-01-29 ENCOUNTER — OUTPATIENT (OUTPATIENT)
Dept: OUTPATIENT SERVICES | Facility: HOSPITAL | Age: 64
LOS: 1 days | End: 2019-01-29
Payer: MEDICARE

## 2019-01-29 VITALS
HEART RATE: 56 BPM | OXYGEN SATURATION: 97 % | RESPIRATION RATE: 18 BRPM | HEIGHT: 66 IN | TEMPERATURE: 99 F | DIASTOLIC BLOOD PRESSURE: 67 MMHG | SYSTOLIC BLOOD PRESSURE: 147 MMHG | WEIGHT: 164.91 LBS

## 2019-01-29 DIAGNOSIS — I25.10 ATHEROSCLEROTIC HEART DISEASE OF NATIVE CORONARY ARTERY WITHOUT ANGINA PECTORIS: ICD-10-CM

## 2019-01-29 DIAGNOSIS — Z98.89 OTHER SPECIFIED POSTPROCEDURAL STATES: Chronic | ICD-10-CM

## 2019-01-29 PROCEDURE — 99203 OFFICE O/P NEW LOW 30 MIN: CPT

## 2019-01-29 PROCEDURE — 33285 INSJ SUBQ CAR RHYTHM MNTR: CPT

## 2019-01-29 PROCEDURE — 93005 ELECTROCARDIOGRAM TRACING: CPT

## 2019-01-29 PROCEDURE — C1764: CPT

## 2019-01-29 PROCEDURE — 93010 ELECTROCARDIOGRAM REPORT: CPT

## 2019-01-29 RX ORDER — METOPROLOL TARTRATE 50 MG
0.5 TABLET ORAL
Qty: 0 | Refills: 0 | COMMUNITY

## 2019-01-29 RX ORDER — DIAZEPAM 5 MG
1 TABLET ORAL
Qty: 0 | Refills: 0 | COMMUNITY

## 2019-01-29 RX ORDER — GABAPENTIN 400 MG/1
2 CAPSULE ORAL
Qty: 0 | Refills: 0 | COMMUNITY

## 2019-01-29 RX ORDER — ALFUZOSIN HYDROCHLORIDE 10 MG/1
1 TABLET, EXTENDED RELEASE ORAL
Qty: 0 | Refills: 0 | COMMUNITY

## 2019-01-29 NOTE — H&P CARDIOLOGY - HISTORY OF PRESENT ILLNESS
64 yr old  male with PMHx of HTN, HLD, NIKKY( does not use CPAP) CAD (Stents), RBBB presents with nausea, syncopal episode lasting a few seconds while sitting up. HR felt fast. Pt visited ER and CT chest showed no significant new abnormality. Pt was referred for ILR.     < from: MR Cardiac w/wo IV Cont (01.21.19 @ 14:49) >  1.  The left ventricle is hyperdynamic with an estimated ejection fraction of 70.57%.  2.  Subtle focus of patchy mid myocardial late gadolinium enhancement and corresponding wall motion abnormality could occur in the setting of a nonischemic cardiomyopathy.

## 2019-01-29 NOTE — H&P CARDIOLOGY - PMH
NSVT (nonsustained ventricular tachycardia)  found on holter monitor  BPH (Benign Prostatic Hyperplasia)    Sleep apnea  diagnosed by sleep study - unable to tolerate CPAP  GI bleed  abdominal pain and bloody diarrhea which resolved on its own no relation to antibiotics Colonoscopy did not reveal abnormality.  Pt states occurred two times over 5 years ago.  Stomach ulcer    HTN - Hypertension    H/O hyperlipidemia    Myalgia    Sarcoidosis    CAD (coronary artery disease)  stents x2 1995

## 2019-02-04 ENCOUNTER — APPOINTMENT (OUTPATIENT)
Dept: UROLOGY | Facility: CLINIC | Age: 64
End: 2019-02-04
Payer: MEDICARE

## 2019-02-04 PROCEDURE — 99213 OFFICE O/P EST LOW 20 MIN: CPT

## 2019-02-13 ENCOUNTER — APPOINTMENT (OUTPATIENT)
Dept: ELECTROPHYSIOLOGY | Facility: CLINIC | Age: 64
End: 2019-02-13
Payer: MEDICARE

## 2019-02-13 ENCOUNTER — NON-APPOINTMENT (OUTPATIENT)
Age: 64
End: 2019-02-13

## 2019-02-13 VITALS
WEIGHT: 167 LBS | OXYGEN SATURATION: 95 % | HEIGHT: 66 IN | SYSTOLIC BLOOD PRESSURE: 130 MMHG | DIASTOLIC BLOOD PRESSURE: 82 MMHG | HEART RATE: 67 BPM | BODY MASS INDEX: 26.84 KG/M2

## 2019-02-13 PROCEDURE — 93285 PRGRMG DEV EVAL SCRMS IP: CPT

## 2019-02-19 ENCOUNTER — APPOINTMENT (OUTPATIENT)
Dept: CARDIOLOGY | Facility: CLINIC | Age: 64
End: 2019-02-19
Payer: MEDICARE

## 2019-02-19 ENCOUNTER — NON-APPOINTMENT (OUTPATIENT)
Age: 64
End: 2019-02-19

## 2019-02-19 VITALS
OXYGEN SATURATION: 96 % | DIASTOLIC BLOOD PRESSURE: 68 MMHG | WEIGHT: 167 LBS | HEIGHT: 66 IN | SYSTOLIC BLOOD PRESSURE: 120 MMHG | HEART RATE: 57 BPM | BODY MASS INDEX: 26.84 KG/M2

## 2019-02-19 DIAGNOSIS — R55 SYNCOPE AND COLLAPSE: ICD-10-CM

## 2019-02-19 PROCEDURE — 93000 ELECTROCARDIOGRAM COMPLETE: CPT

## 2019-02-19 PROCEDURE — 99215 OFFICE O/P EST HI 40 MIN: CPT

## 2019-02-19 NOTE — HISTORY OF PRESENT ILLNESS
[FreeTextEntry1] : Had a loop recorder inserted.\par has a vague lightheaded feeling that does not correspond to an arrhythmia.\par He feels nervous and anxious and is reluctant to exercise.\par  \par \par

## 2019-02-19 NOTE — DISCUSSION/SUMMARY
[FreeTextEntry1] : 64 year old with With coronary disease and Sarcoid with recent nausea mediated vasovagal syncope.\par Doubt arrhythmia as a source.\par Repeat cardiac MRI was unremarkable.\par Loop recorder by EP done.\par Will consider repatha to allow for reduced stain use to allow for less muscle ache and increase exercise.\par Also LDL not at goal.\par

## 2019-03-15 ENCOUNTER — APPOINTMENT (OUTPATIENT)
Dept: PULMONOLOGY | Facility: CLINIC | Age: 64
End: 2019-03-15
Payer: MEDICARE

## 2019-03-15 ENCOUNTER — NON-APPOINTMENT (OUTPATIENT)
Age: 64
End: 2019-03-15

## 2019-03-15 VITALS
DIASTOLIC BLOOD PRESSURE: 80 MMHG | HEART RATE: 57 BPM | SYSTOLIC BLOOD PRESSURE: 120 MMHG | RESPIRATION RATE: 17 BRPM | HEIGHT: 65 IN | BODY MASS INDEX: 27.49 KG/M2 | WEIGHT: 165 LBS | OXYGEN SATURATION: 98 %

## 2019-03-15 PROCEDURE — 94010 BREATHING CAPACITY TEST: CPT

## 2019-03-15 PROCEDURE — 99214 OFFICE O/P EST MOD 30 MIN: CPT | Mod: 25

## 2019-03-15 NOTE — ADDENDUM
[FreeTextEntry1] : Documented by Carlos Norris acting as a scribe for Dr. Nazario Slaughter on 03/15/2019.\par All medical record entries made by the Scribe were at my, Dr. Nazario Slaughter's, direction and personally dictated by me on 03/15/2019. I have reviewed the chart and agree that the record accurately reflects my personal performance of the history, physical exam, assessment and plan. I have also personally directed, reviewed, and agree with the discharge instructions.\par

## 2019-03-15 NOTE — HISTORY OF PRESENT ILLNESS
[FreeTextEntry1] : Mr. CROSS is a 64 year year old male with a history of cardiac disease, sarcoidosis, asthma, allergy, OSAS, s/p syncope (loop recorder) who presents for a follow-up pulmonary evaluation. His chief complaint is sinuses / insomnia.\par -he is feeling okay\par -he recently had an episode of vasovagal for which he was hospitalized at [facility] where he had a loop recorder implanted for diagnostic purposes and he has not had any difficulties with it\par -his sleep is really bad\par -he cannot sleep\par -he gained weight\par -he denies coughing or wheezing\par -he has a sour taste in his mouth especially when he wakes up\par -his sinuses are inflamed\par -his energy level is fair\par -he has bad insomnia\par -he is retired\par -he is walking/doing errands for exercise\par -he DID NOT GET ORAL APPLIANCE\par -he is using a saline nasal spray intermittently for his sinuses\par -his leg condition (sarcoidosis) is okay\par -he denies any headaches, nausea, vomiting, fever, chills, sweats, chest pain, chest pressure, diarrhea, constipation, dysphagia, dizziness, leg swelling, leg pain, itchy eyes, itchy ears, heartburn, reflux.

## 2019-03-15 NOTE — REASON FOR VISIT
[Follow-Up] : a follow-up visit [FreeTextEntry1] : cardiac disease, sarcoidosis, asthma, allergy, OSAS, s/p syncope (loop recorder)

## 2019-03-15 NOTE — PHYSICAL EXAM
[General Appearance - Well Developed] : well developed [Normal Appearance] : normal appearance [Well Groomed] : well groomed [General Appearance - Well Nourished] : well nourished [No Deformities] : no deformities [General Appearance - In No Acute Distress] : no acute distress [Normal Conjunctiva] : the conjunctiva exhibited no abnormalities [Eyelids - No Xanthelasma] : the eyelids demonstrated no xanthelasmas [Normal Oropharynx] : normal oropharynx [I] : I [Neck Appearance] : the appearance of the neck was normal [Neck Cervical Mass (___cm)] : no neck mass was observed [Jugular Venous Distention Increased] : there was no jugular-venous distention [Thyroid Diffuse Enlargement] : the thyroid was not enlarged [Thyroid Nodule] : there were no palpable thyroid nodules [Heart Rate And Rhythm] : heart rate and rhythm were normal [Heart Sounds] : normal S1 and S2 [Murmurs] : no murmurs present [Respiration, Rhythm And Depth] : normal respiratory rhythm and effort [Exaggerated Use Of Accessory Muscles For Inspiration] : no accessory muscle use [Auscultation Breath Sounds / Voice Sounds] : lungs were clear to auscultation bilaterally [Abdomen Soft] : soft [Abdomen Tenderness] : non-tender [Abdomen Mass (___ Cm)] : no abdominal mass palpated [Abnormal Walk] : normal gait [Gait - Sufficient For Exercise Testing] : the gait was sufficient for exercise testing [Nail Clubbing] : no clubbing of the fingernails [Cyanosis, Localized] : no localized cyanosis [Petechial Hemorrhages (___cm)] : no petechial hemorrhages [Skin Color & Pigmentation] : normal skin color and pigmentation [] : no rash [No Venous Stasis] : no venous stasis [Skin Lesions] : no skin lesions [No Skin Ulcers] : no skin ulcer [No Xanthoma] : no  xanthoma was observed [Deep Tendon Reflexes (DTR)] : deep tendon reflexes were 2+ and symmetric [Sensation] : the sensory exam was normal to light touch and pinprick [No Focal Deficits] : no focal deficits [Oriented To Time, Place, And Person] : oriented to person, place, and time [Impaired Insight] : insight and judgment were intact [Affect] : the affect was normal [FreeTextEntry1] : I:E 1:3; clear.

## 2019-03-15 NOTE — PROCEDURE
[FreeTextEntry1] : PFT- spi reveals normal flows; FEV1 was 3.50 L which is 124% of predicted; normal flow volume loop.

## 2019-03-15 NOTE — ASSESSMENT
[FreeTextEntry1] : Mr. Feliz with a history of cardiac disease, sarcoidosis, asthma, allergy, OSAS, s/p syncope (loop recorder).\par \par \par His shortness of breath is felt to be multifactorial due to: \par \par -chest pain syndrome being costochondritis \par -cardiac disease\par -poor breathing mechanics\par -out of shape\par -asthmatic type bronchitis (flair)\par -sarcoidosis\par \par problem 1: asthma -- stable\par -continue Breo 200 at 1 inhalation QD\par -continue Singulair 10 mg before bed\par -continue Spiriva 2 puffs QD \par -Asthma is  believed to be caused by inherited (genetic) and environmental factor, but its exact cause is unknown. Asthma may be triggered by allergens, lung infections, or irritants in the air. Asthma triggers are different for each person\par -Inhaler technique reviewed as well as oral hygiene techniques reviewed with patient. Avoidance of cold air, extremes of temperature, rescue inhaler should be used before exercise. Order of medication reviewed with patient. Recommended use of a cool mist humidifier in the bedroom.\par \par problem 2: allergic rhinitis/sinusitis (NONCOMPLIANT)\par -he is being recommended to use nasal saline\par -continue Budesonide nasal spray 1 sniff each nostril BID\par -continue Olopatadine 1 sniff each nostril BID\par \par problem 3: GERD\par -continue Zantac 300mg QHS\par \par -Rule of 2's- Avoid eating too much, too late, too poorly, too spicy, or two hours before bed \par -Things to avoid including overeating, spicy foods, tight clothing, eating within three hours of bed, this list is not all inclusive. \par -For treatment of reflux, possible options discussed including diet control, H2 blockers, PPIs, as well as coating motility agents discussed as treatment options. Timing of meals and proximity of last meal to sleep were discussed. If symptoms persist, a formal gastrointestinal evaluation is needed.\par \par problem 4: NIKKY\par -he is being recommended an oral appliance and highly encourage to follow up with Dr. Nawaf Hoang \par -Discussed the risks/associations with coronary artery disease, atrial fibrillation, arrhythmia, memory loss, issues with concentration, stroke risk, hypertension, nocturia, chronic reflux/Hassan’s esophagus some but not all inclusive. Treatment options discussed including CPAP/BiPAP machine, oral appliance, ProVent therapy, Oxy-Aid by Respitec, new technologies, or positional sleep. \par \par problem 5: sarcoidosis (quiet)\par -?quiet at this point in time\par -continue yearly ophthalmological evaluations/PFTs\par \par -Sarcoidosis is a medical mystery and can present with very serious illness or little consequence. The disease can affect any organ including skin, liver, lymph glands, spleen, eyes, nervous system, musculoskeletal system, heart, brain, kidneys, and including the lungs. Pulmonary sarcoidosis can cause loss of lung volume and abnormal lung stiffness. This disease is characterized by presence of granulomas, small areas of inflamed cells. Sarcoidosis patients should have regular cardiac and eye examinations as well as regular PFTs.\par \par problem 6: cardiac disease\par -continue to follow up with the cardiologist Dr. Foster\par \par problem 7: overweight\par -Weight loss, exercise, and diet control were discussed and are highly encouraged. Treatment options were given such as, aqua therapy, and contacting a nutritionist. Recommended to use the elliptical, stationary bike, less use of treadmill.  Obesity is associated with worsening asthma, shortness of breath, and potential for cardiac disease, diabetes, and other underlying medical conditions. \par \par Problem 8: Costochondritis \par -recommended Topricin cream\par -recommend P6 cream\par \par Problem 9: health maintenance\par -continue Urology f/u\par -recommended a yearly flu shot after October 15\par -recommended strep pneumonia vaccines: Prevnar-13 vaccine, followed by Pneumo vaccine 23 on year following\par -recommended early intervention for URIs\par -recommended osteoporosis evaluations\par -recommended early dermatological evaluations\par -recommended after the age of 50 to the age of 70, colonoscopy every 5 years\par -encouraged early intervention\par \par F/U in 4 months\par He is encouraged to call with any changes, concerns, or questions

## 2019-03-19 ENCOUNTER — NON-APPOINTMENT (OUTPATIENT)
Age: 64
End: 2019-03-19

## 2019-03-19 ENCOUNTER — APPOINTMENT (OUTPATIENT)
Dept: CARDIOLOGY | Facility: CLINIC | Age: 64
End: 2019-03-19
Payer: MEDICARE

## 2019-03-19 VITALS
DIASTOLIC BLOOD PRESSURE: 70 MMHG | OXYGEN SATURATION: 96 % | WEIGHT: 168 LBS | HEART RATE: 58 BPM | SYSTOLIC BLOOD PRESSURE: 126 MMHG | BODY MASS INDEX: 27.96 KG/M2

## 2019-03-19 PROCEDURE — 93000 ELECTROCARDIOGRAM COMPLETE: CPT

## 2019-03-19 PROCEDURE — 99215 OFFICE O/P EST HI 40 MIN: CPT

## 2019-03-19 NOTE — HISTORY OF PRESENT ILLNESS
[FreeTextEntry1] : Taking Repatha as direected.\par No syncope.\par Chest burning in the epigastrium and toward his pecs. Tender to the touch. pleuritic too.\par \par

## 2019-03-20 LAB
ALBUMIN SERPL ELPH-MCNC: 4.3 G/DL
ALP BLD-CCNC: 61 U/L
ALT SERPL-CCNC: 29 U/L
ANION GAP SERPL CALC-SCNC: 11 MMOL/L
AST SERPL-CCNC: 26 U/L
BASOPHILS # BLD AUTO: 0.04 K/UL
BASOPHILS NFR BLD AUTO: 0.9 %
BILIRUB SERPL-MCNC: 0.4 MG/DL
BUN SERPL-MCNC: 23 MG/DL
CALCIUM SERPL-MCNC: 9.4 MG/DL
CHLORIDE SERPL-SCNC: 103 MMOL/L
CHOLEST SERPL-MCNC: 107 MG/DL
CHOLEST/HDLC SERPL: 1.9 RATIO
CO2 SERPL-SCNC: 26 MMOL/L
CREAT SERPL-MCNC: 0.99 MG/DL
EOSINOPHIL # BLD AUTO: 0.38 K/UL
EOSINOPHIL NFR BLD AUTO: 8.1 %
GLUCOSE SERPL-MCNC: 106 MG/DL
HCT VFR BLD CALC: 49.6 %
HDLC SERPL-MCNC: 57 MG/DL
HGB BLD-MCNC: 15.9 G/DL
IMM GRANULOCYTES NFR BLD AUTO: 0.2 %
LDLC SERPL CALC-MCNC: 31 MG/DL
LYMPHOCYTES # BLD AUTO: 1.15 K/UL
LYMPHOCYTES NFR BLD AUTO: 24.6 %
MAN DIFF?: NORMAL
MCHC RBC-ENTMCNC: 31.1 PG
MCHC RBC-ENTMCNC: 32.1 GM/DL
MCV RBC AUTO: 96.9 FL
MONOCYTES # BLD AUTO: 0.35 K/UL
MONOCYTES NFR BLD AUTO: 7.5 %
NEUTROPHILS # BLD AUTO: 2.75 K/UL
NEUTROPHILS NFR BLD AUTO: 58.7 %
PLATELET # BLD AUTO: 152 K/UL
POTASSIUM SERPL-SCNC: 4.9 MMOL/L
PROT SERPL-MCNC: 6.3 G/DL
RBC # BLD: 5.12 M/UL
RBC # FLD: 13.3 %
SODIUM SERPL-SCNC: 140 MMOL/L
TRIGL SERPL-MCNC: 95 MG/DL
WBC # FLD AUTO: 4.68 K/UL

## 2019-04-01 ENCOUNTER — APPOINTMENT (OUTPATIENT)
Dept: ELECTROPHYSIOLOGY | Facility: HOSPITAL | Age: 64
End: 2019-04-01
Payer: MEDICARE

## 2019-04-01 PROCEDURE — 93298 REM INTERROG DEV EVAL SCRMS: CPT

## 2019-04-01 PROCEDURE — 93299: CPT

## 2019-04-02 ENCOUNTER — MEDICATION RENEWAL (OUTPATIENT)
Age: 64
End: 2019-04-02

## 2019-04-12 ENCOUNTER — MEDICATION RENEWAL (OUTPATIENT)
Age: 64
End: 2019-04-12

## 2019-04-22 ENCOUNTER — MEDICATION RENEWAL (OUTPATIENT)
Age: 64
End: 2019-04-22

## 2019-05-02 ENCOUNTER — APPOINTMENT (OUTPATIENT)
Dept: ELECTROPHYSIOLOGY | Facility: HOSPITAL | Age: 64
End: 2019-05-02

## 2019-05-02 ENCOUNTER — APPOINTMENT (OUTPATIENT)
Dept: ELECTROPHYSIOLOGY | Facility: CLINIC | Age: 64
End: 2019-05-02
Payer: MEDICARE

## 2019-05-02 PROCEDURE — 93298 REM INTERROG DEV EVAL SCRMS: CPT

## 2019-05-02 PROCEDURE — 93299: CPT

## 2019-05-17 ENCOUNTER — MEDICATION RENEWAL (OUTPATIENT)
Age: 64
End: 2019-05-17

## 2019-05-20 ENCOUNTER — APPOINTMENT (OUTPATIENT)
Dept: UROLOGY | Facility: CLINIC | Age: 64
End: 2019-05-20
Payer: MEDICARE

## 2019-05-20 PROCEDURE — 99214 OFFICE O/P EST MOD 30 MIN: CPT

## 2019-05-21 ENCOUNTER — CLINICAL ADVICE (OUTPATIENT)
Age: 64
End: 2019-05-21

## 2019-05-21 LAB
C TRACH RRNA SPEC QL NAA+PROBE: NOT DETECTED
HSV 1+2 IGG SER IA-IMP: NEGATIVE
HSV 1+2 IGG SER IA-IMP: POSITIVE
HSV1 IGG SER QL: 0.15 INDEX
HSV2 IGG SER QL: 5.11 INDEX
N GONORRHOEA RRNA SPEC QL NAA+PROBE: NOT DETECTED
SOURCE AMPLIFICATION: NORMAL
SOURCE AMPLIFICATION: NORMAL
T VAGINALIS RRNA SPEC QL NAA+PROBE: NOT DETECTED

## 2019-05-22 LAB
HSV1 IGM SER QL: NORMAL TITER
HSV2 AB FLD-ACNC: NORMAL TITER
RPR SER-TITR: NORMAL

## 2019-06-03 ENCOUNTER — APPOINTMENT (OUTPATIENT)
Dept: ELECTROPHYSIOLOGY | Facility: CLINIC | Age: 64
End: 2019-06-03
Payer: MEDICARE

## 2019-06-03 PROCEDURE — 93298 REM INTERROG DEV EVAL SCRMS: CPT

## 2019-06-03 PROCEDURE — 93299: CPT

## 2019-06-05 ENCOUNTER — APPOINTMENT (OUTPATIENT)
Dept: CARDIOLOGY | Facility: CLINIC | Age: 64
End: 2019-06-05
Payer: MEDICARE

## 2019-06-05 ENCOUNTER — NON-APPOINTMENT (OUTPATIENT)
Age: 64
End: 2019-06-05

## 2019-06-05 ENCOUNTER — APPOINTMENT (OUTPATIENT)
Dept: UROLOGY | Facility: CLINIC | Age: 64
End: 2019-06-05
Payer: MEDICARE

## 2019-06-05 VITALS
SYSTOLIC BLOOD PRESSURE: 104 MMHG | BODY MASS INDEX: 26.99 KG/M2 | RESPIRATION RATE: 96 BRPM | WEIGHT: 162 LBS | DIASTOLIC BLOOD PRESSURE: 82 MMHG | HEART RATE: 45 BPM | HEIGHT: 65 IN

## 2019-06-05 LAB
ALBUMIN SERPL ELPH-MCNC: 4.6 G/DL
ALP BLD-CCNC: 61 U/L
ALT SERPL-CCNC: 29 U/L
ANION GAP SERPL CALC-SCNC: 11 MMOL/L
AST SERPL-CCNC: 26 U/L
BILIRUB SERPL-MCNC: 0.5 MG/DL
BUN SERPL-MCNC: 20 MG/DL
CALCIUM SERPL-MCNC: 9.6 MG/DL
CHLORIDE SERPL-SCNC: 103 MMOL/L
CHOLEST SERPL-MCNC: 108 MG/DL
CHOLEST/HDLC SERPL: 1.9 RATIO
CO2 SERPL-SCNC: 28 MMOL/L
CREAT SERPL-MCNC: 0.96 MG/DL
GLUCOSE SERPL-MCNC: 109 MG/DL
HDLC SERPL-MCNC: 57 MG/DL
LDLC SERPL CALC-MCNC: 38 MG/DL
POTASSIUM SERPL-SCNC: 4.6 MMOL/L
PROT SERPL-MCNC: 6.3 G/DL
SODIUM SERPL-SCNC: 142 MMOL/L
TRIGL SERPL-MCNC: 64 MG/DL

## 2019-06-05 PROCEDURE — 99214 OFFICE O/P EST MOD 30 MIN: CPT

## 2019-06-05 PROCEDURE — 93000 ELECTROCARDIOGRAM COMPLETE: CPT

## 2019-06-05 NOTE — PHYSICAL EXAM
[General Appearance - Well Developed] : well developed [General Appearance - Well Nourished] : well nourished [Normal Conjunctiva] : the conjunctiva exhibited no abnormalities [No Oral Pallor] : no oral pallor [Normal Jugular Venous V Waves Present] : normal jugular venous V waves present [Heart Sounds] : normal S1 and S2 [Respiration, Rhythm And Depth] : normal respiratory rhythm and effort [Auscultation Breath Sounds / Voice Sounds] : lungs were clear to auscultation bilaterally [Murmurs] : no murmurs present [Abnormal Walk] : normal gait [Abdomen Soft] : soft [Skin Turgor] : normal skin turgor [Cyanosis, Localized] : no localized cyanosis [Oriented To Time, Place, And Person] : oriented to person, place, and time [Affect] : the affect was normal

## 2019-06-05 NOTE — HISTORY OF PRESENT ILLNESS
[FreeTextEntry1] : Taking Repatha as directed.\par Feels good.\par No syncope.\par Walks a lot and feels good.\par \par \par

## 2019-06-05 NOTE — DISCUSSION/SUMMARY
[FreeTextEntry1] : 64 year old with with coronary disease and Sarcoid with recent nausea mediated vasovagal syncope.\par Doubt arrhythmia as a source. Loop recorder without reported arrhythmia.\par No syncope.\par Will continue repatha. Check labs.\par

## 2019-06-16 PROBLEM — I73.9 CLAUDICATION: Status: ACTIVE | Noted: 2017-03-10

## 2019-06-18 ENCOUNTER — APPOINTMENT (OUTPATIENT)
Dept: INFECTIOUS DISEASE | Facility: CLINIC | Age: 64
End: 2019-06-18
Payer: MEDICARE

## 2019-06-18 ENCOUNTER — APPOINTMENT (OUTPATIENT)
Dept: INFECTIOUS DISEASE | Facility: CLINIC | Age: 64
End: 2019-06-18

## 2019-06-18 ENCOUNTER — EMERGENCY (EMERGENCY)
Facility: HOSPITAL | Age: 64
LOS: 1 days | Discharge: HOME | End: 2019-06-18
Attending: EMERGENCY MEDICINE
Payer: COMMERCIAL

## 2019-06-18 VITALS
HEART RATE: 52 BPM | DIASTOLIC BLOOD PRESSURE: 83 MMHG | HEIGHT: 65 IN | OXYGEN SATURATION: 98 % | SYSTOLIC BLOOD PRESSURE: 143 MMHG | TEMPERATURE: 98.4 F

## 2019-06-18 VITALS
OXYGEN SATURATION: 98 % | WEIGHT: 162.92 LBS | HEART RATE: 58 BPM | RESPIRATION RATE: 16 BRPM | DIASTOLIC BLOOD PRESSURE: 80 MMHG | SYSTOLIC BLOOD PRESSURE: 163 MMHG | HEIGHT: 66 IN | TEMPERATURE: 98 F

## 2019-06-18 DIAGNOSIS — B00.9 HERPESVIRAL INFECTION, UNSPECIFIED: ICD-10-CM

## 2019-06-18 DIAGNOSIS — Z98.89 OTHER SPECIFIED POSTPROCEDURAL STATES: Chronic | ICD-10-CM

## 2019-06-18 PROCEDURE — 93005 ELECTROCARDIOGRAM TRACING: CPT

## 2019-06-18 PROCEDURE — 71046 X-RAY EXAM CHEST 2 VIEWS: CPT

## 2019-06-18 PROCEDURE — 99284 EMERGENCY DEPT VISIT MOD MDM: CPT | Mod: 25

## 2019-06-18 PROCEDURE — 99283 EMERGENCY DEPT VISIT LOW MDM: CPT | Mod: 25

## 2019-06-18 PROCEDURE — 99205 OFFICE O/P NEW HI 60 MIN: CPT

## 2019-06-18 PROCEDURE — 93010 ELECTROCARDIOGRAM REPORT: CPT

## 2019-06-18 PROCEDURE — 71046 X-RAY EXAM CHEST 2 VIEWS: CPT | Mod: 26

## 2019-06-18 RX ORDER — IBUPROFEN 200 MG
600 TABLET ORAL ONCE
Refills: 0 | Status: COMPLETED | OUTPATIENT
Start: 2019-06-18 | End: 2019-06-18

## 2019-06-18 RX ADMIN — Medication 600 MILLIGRAM(S): at 20:17
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
## 2019-06-18 NOTE — ED PROVIDER NOTE - CARE PLAN
Principal Discharge DX:	Contusion of chest wall, unspecified laterality, initial encounter  Secondary Diagnosis:	Cervical sprain, initial encounter

## 2019-06-18 NOTE — ED PROVIDER NOTE - NSFOLLOWUPCLINICS_GEN_ALL_ED_FT
[facility] General Internal Medicine  General Internal Medicine  [address] [id number]  Phone: [phone]  Fax:   Follow Up Time:

## 2019-06-18 NOTE — ED ADULT TRIAGE NOTE - WEIGHT IN KG
----- Message from Mo Bray sent at 8/20/2018 11:30 AM CDT -----  Type:  RX Refill Request    Who Called:  Patient  Refill or New Rx:  Refill  RX Name and Strength:  Estrace Vaginal Cream 0.1mg-gm  Preferred Pharmacy with phone number:    [id number] - [place] - [address] AT [place] [address]  [address] [address]  Phone: [phone] Fax: [phone]  Local or Mail Order:  Local  Ordering Provider:  Same  Best Call Back Number:  [phone]     73.9
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
## 2019-06-18 NOTE — ED PROVIDER NOTE - MUSCULOSKELETAL, MLM
No midline spinal tenderness.  Neck supple full ROM.  Mild sternal tenderness.  Remained of chest wall NT.  b/l UE and LE NT throughout full ROM.  Pelvis stable and on tender.
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
## 2019-06-18 NOTE — ED PROVIDER NOTE - NSFOLLOWUPINSTRUCTIONS_ED_ALL_ED_FT
1- Motrin / Tylenol as needed for pain  2- Incentive spirometer as many times a day as you can fit in  3- Follow up with your doctor or our medicine clinic   4- Return to ER for any new or worsening symptoms

## 2019-06-18 NOTE — ED ADULT NURSE NOTE - NSIMPLEMENTINTERV_GEN_ALL_ED
Implemented All Universal Safety Interventions:  [place] to call system. Call bell, personal items and telephone within reach. Instruct patient to call for assistance. Room bathroom lighting operational. Non-slip footwear when patient is off stretcher. Physically safe environment: no spills, clutter or unnecessary equipment. Stretcher in lowest position, wheels locked, appropriate side rails in place.

## 2019-06-18 NOTE — ED PROVIDER NOTE - PROGRESS NOTE DETAILS
no step off on lateral chest xr of sternum -- no ptx visualized , bl breast shadow seen - ekg unchanged from old -- will dc w analgeisa and incentive spirometry

## 2019-07-05 ENCOUNTER — APPOINTMENT (OUTPATIENT)
Dept: ELECTROPHYSIOLOGY | Facility: CLINIC | Age: 64
End: 2019-07-05
Payer: MEDICARE

## 2019-07-05 PROCEDURE — 93298 REM INTERROG DEV EVAL SCRMS: CPT

## 2019-07-05 PROCEDURE — 93299: CPT

## 2019-07-16 PROBLEM — B00.9 HSV-2 (HERPES SIMPLEX VIRUS 2) INFECTION: Status: ACTIVE | Noted: 2019-05-21

## 2019-07-18 ENCOUNTER — APPOINTMENT (OUTPATIENT)
Dept: PULMONOLOGY | Facility: CLINIC | Age: 64
End: 2019-07-18
Payer: MEDICARE

## 2019-07-18 VITALS
BODY MASS INDEX: 26.66 KG/M2 | HEIGHT: 65 IN | OXYGEN SATURATION: 98 % | SYSTOLIC BLOOD PRESSURE: 120 MMHG | HEART RATE: 54 BPM | DIASTOLIC BLOOD PRESSURE: 70 MMHG | WEIGHT: 160 LBS

## 2019-07-18 DIAGNOSIS — J45.909 UNSPECIFIED ASTHMA, UNCOMPLICATED: ICD-10-CM

## 2019-07-18 PROCEDURE — 95012 NITRIC OXIDE EXP GAS DETER: CPT

## 2019-07-18 PROCEDURE — ZZZZZ: CPT

## 2019-07-18 PROCEDURE — 99214 OFFICE O/P EST MOD 30 MIN: CPT | Mod: 25

## 2019-07-18 PROCEDURE — 94729 DIFFUSING CAPACITY: CPT

## 2019-07-18 PROCEDURE — 94010 BREATHING CAPACITY TEST: CPT

## 2019-07-18 NOTE — REASON FOR VISIT
[Follow-Up] : a follow-up visit [FreeTextEntry1] : OSAS, sarcoidosis, asthma, allergy, OSAS, s/p syncope (loop recorder)

## 2019-07-18 NOTE — PROCEDURE
[FreeTextEntry1] : PFT- spi reveals normal flows; FEV1 was 4.12 L which is 146% of predicted; lung volumes WNL; diffusion WNL; normal flow volume loop \par \par FENO was 5; a normal value being less than 25\par \par Fractional exhaled nitric oxide (FENO) is regarded as a simple, noninvasive method for assessing eosinophilic airway inflammation. Produced by a variety of cells within the lung, nitric oxide (NO) concentrations are generally low in healthy individuals. However, high concentrations of NO appear to be involved in nonspecific host defense mechanisms and chronic inflammatory diseases such as asthma. The American Thoracic Society (ATS) therefore has recommended using FENO to aid in the diagnosis and monitoring of eosinophilic airway inflammation and asthma, and for identifying steroid responsive individuals whose chronic respiratory symptoms may be caused by airway inflammation.

## 2019-07-18 NOTE — ASSESSMENT
[FreeTextEntry1] : Mr. Feliz is a 64 year old male with a history of cardiac disease, sarcoidosis, asthma, allergy, OSAS, s/p syncope (loop recorder) - stable. \par \par His shortness of breath is felt to be multifactorial due to: \par \par -chest pain syndrome being costochondritis \par -cardiac disease\par -poor breathing mechanics\par -out of shape\par -asthmatic type bronchitis (flair)\par -sarcoidosis\par \par problem 1: asthma -- stable\par -continue Breo 200 at 1 inhalation QD\par -continue Singulair 10 mg before bed\par -continue Spiriva 2 puffs QD \par -Asthma is  believed to be caused by inherited (genetic) and environmental factor, but its exact cause is unknown. Asthma may be triggered by allergens, lung infections, or irritants in the air. Asthma triggers are different for each person\par -Inhaler technique reviewed as well as oral hygiene techniques reviewed with patient. Avoidance of cold air, extremes of temperature, rescue inhaler should be used before exercise. Order of medication reviewed with patient. Recommended use of a cool mist humidifier in the bedroom.\par \par problem 2: allergic rhinitis/sinusitis (NONCOMPLIANT)\par -he is being recommended to use nasal saline - [place]age.\par -continue Budesonide nasal spray 1 sniff each nostril BID\par -continue Olopatadine 1 sniff each nostril BID\par \par Environmental measures for allergies were encouraged including mattress and pillow cover, air purifier, and environmental controls. \par \par problem 3: GERD\par -continue Zantac 300mg QHS\par \par -Rule of 2's- Avoid eating too much, too late, too poorly, too spicy, or two hours before bed \par -Things to avoid including overeating, spicy foods, tight clothing, eating within three hours of bed, this list is not all inclusive. \par -For treatment of reflux, possible options discussed including diet control, H2 blockers, PPIs, as well as coating motility agents discussed as treatment options. Timing of meals and proximity of last meal to sleep were discussed. If symptoms persist, a formal gastrointestinal evaluation is needed.\par \par problem 4: NIKKY\par -he is being recommended an oral appliance and highly encourage to follow up with Dr. Nawaf Clemente\par -Discussed the risks/associations with coronary artery disease, atrial fibrillation, arrhythmia, memory loss, issues with concentration, stroke risk, hypertension, nocturia, chronic reflux/Hassan’s esophagus some but not all inclusive. Treatment options discussed including CPAP/BiPAP machine, oral appliance, ProVent therapy, Oxy-Aid by Respitec, new technologies, or positional sleep. \par \par problem 5: sarcoidosis (quiet)\par -?quiet at this point in time\par -continue yearly ophthalmological evaluations/PFTs\par \par -Sarcoidosis is a medical mystery and can present with very serious illness or little consequence. The disease can affect any organ including skin, liver, lymph glands, spleen, eyes, nervous system, musculoskeletal system, heart, brain, kidneys, and including the lungs. Pulmonary sarcoidosis can cause loss of lung volume and abnormal lung stiffness. This disease is characterized by presence of granulomas, small areas of inflamed cells. Sarcoidosis patients should have regular cardiac and eye examinations as well as regular PFTs.\par \par problem 6: cardiac disease\par -continue to follow up with the cardiologist Dr. Foster\par \par problem 7: overweight\par -Weight loss, exercise, and diet control were discussed and are highly encouraged. Treatment options were given such as, aqua therapy, and contacting a nutritionist. Recommended to use the elliptical, stationary bike, less use of treadmill.  Obesity is associated with worsening asthma, shortness of breath, and potential for cardiac disease, diabetes, and other underlying medical conditions. \par \par Problem 8: Costochondritis (Quiet)\par -recommended Topricin cream\par -recommend P6 cream\par \par Problem 9: health maintenance\par -continue Urology f/u\par -recommended a yearly flu shot after October 15\par -recommended strep pneumonia vaccines: Prevnar-13 vaccine, followed by Pneumo vaccine 23 on year following\par -recommended early intervention for URIs\par -recommended osteoporosis evaluations\par -recommended early dermatological evaluations\par -recommended after the age of 50 to the age of 70, colonoscopy every 5 years\par -encouraged early intervention\par \par F/U in 4 months with spi\par He is encouraged to call with any changes, concerns, or questions

## 2019-07-18 NOTE — HISTORY OF PRESENT ILLNESS
[FreeTextEntry1] : Mr. CROSS is a 64 year year old male with a history of cardiac disease, sarcoidosis, asthma, allergy, OSAS, s/p syncope (loop recorder) who presents for a follow-up pulmonary evaluation. His chief complaint is \par - He states that he has been doing well.\par - He states that his bowels have been regular.\par - He states that his sense of smell and taste have been good.\par - He states that he has not had any syncopal episodes. \par - He states that he has some intermittent chest pain and pressure, which he feels is a chronic issue. \par - He states that his breathing has been good aside from occasional wheezing. \par - He states that he walks for exercise, but does have muscular pains due to being on statins and Repatha for HLD. \par - He states that he has diffuse joint aches and pains.\par - He states that he has been snoring, and has had poor sleeping due to his sinuses and cystitis. \par - He denies any headaches, nausea, vomiting, fever, chills, sweats, palpitations, diarrhea, constipation, dysphagia, dizziness, leg swelling, leg pain, itchy eyes, itchy ears, heartburn, reflux, sour taste in the mouth, cough, SOB,.

## 2019-07-18 NOTE — ADDENDUM
[FreeTextEntry1] : Documented by SUZY KAMINSKI acting as a scribe for Dr. Nazario Slaughter on 07/18/2019.\par \par All medical record entries made by the Scribe were at my, Dr. Nazario Slaughter's, direction and personally dictated by me on 07/18/2019. I have reviewed the chart and agree that the record accurately reflects my personal performance of the history, physical exam, assessment and plan. I have also personally directed, reviewed, and agree with the discharge instructions.

## 2019-08-12 ENCOUNTER — MEDICATION RENEWAL (OUTPATIENT)
Age: 64
End: 2019-08-12

## 2019-08-12 ENCOUNTER — RX RENEWAL (OUTPATIENT)
Age: 64
End: 2019-08-12

## 2019-08-13 ENCOUNTER — RX RENEWAL (OUTPATIENT)
Age: 64
End: 2019-08-13

## 2019-08-13 ENCOUNTER — APPOINTMENT (OUTPATIENT)
Dept: ELECTROPHYSIOLOGY | Facility: CLINIC | Age: 64
End: 2019-08-13
Payer: MEDICARE

## 2019-08-13 VITALS
SYSTOLIC BLOOD PRESSURE: 120 MMHG | BODY MASS INDEX: 26.66 KG/M2 | HEART RATE: 49 BPM | OXYGEN SATURATION: 99 % | DIASTOLIC BLOOD PRESSURE: 77 MMHG | WEIGHT: 160 LBS | HEIGHT: 65 IN

## 2019-08-13 PROCEDURE — 93285 PRGRMG DEV EVAL SCRMS IP: CPT

## 2019-09-11 ENCOUNTER — APPOINTMENT (OUTPATIENT)
Dept: CARDIOLOGY | Facility: CLINIC | Age: 64
End: 2019-09-11
Payer: MEDICARE

## 2019-09-11 ENCOUNTER — NON-APPOINTMENT (OUTPATIENT)
Age: 64
End: 2019-09-11

## 2019-09-11 VITALS
WEIGHT: 164 LBS | SYSTOLIC BLOOD PRESSURE: 120 MMHG | TEMPERATURE: 98 F | BODY MASS INDEX: 27.32 KG/M2 | HEART RATE: 45 BPM | OXYGEN SATURATION: 95 % | DIASTOLIC BLOOD PRESSURE: 77 MMHG | HEIGHT: 65 IN

## 2019-09-11 PROCEDURE — 99214 OFFICE O/P EST MOD 30 MIN: CPT

## 2019-09-11 PROCEDURE — 93000 ELECTROCARDIOGRAM COMPLETE: CPT

## 2019-09-11 NOTE — HISTORY OF PRESENT ILLNESS
[FreeTextEntry1] : Taking Repatha as directed. In the donughnut hole.\par Difficulty sleeping due to increased urinary frequency/urgency.\par \par Feels good.\par No syncope.\par Walks a lot and feels good, no chest pain but muscle aches as well.\par Back ache as well..\par \par \par

## 2019-09-11 NOTE — PHYSICAL EXAM
[General Appearance - Well Nourished] : well nourished [General Appearance - Well Developed] : well developed [Normal Conjunctiva] : the conjunctiva exhibited no abnormalities [No Oral Pallor] : no oral pallor [Normal Jugular Venous V Waves Present] : normal jugular venous V waves present [Respiration, Rhythm And Depth] : normal respiratory rhythm and effort [Auscultation Breath Sounds / Voice Sounds] : lungs were clear to auscultation bilaterally [Heart Sounds] : normal S1 and S2 [Murmurs] : no murmurs present [Abdomen Soft] : soft [Abnormal Walk] : normal gait [Cyanosis, Localized] : no localized cyanosis [Skin Turgor] : normal skin turgor [Oriented To Time, Place, And Person] : oriented to person, place, and time [Affect] : the affect was normal

## 2019-09-11 NOTE — DISCUSSION/SUMMARY
[FreeTextEntry1] : 64 year old with with coronary disease and Sarcoid with a history of vasovagal syncope.\par Doubt arrhythmia as a source.\par Loop recorder without reported arrhythmia.\par No further syncope.\par Will continue repatha/asa/high dose statin/.  \par

## 2019-09-16 ENCOUNTER — APPOINTMENT (OUTPATIENT)
Dept: ELECTROPHYSIOLOGY | Facility: CLINIC | Age: 64
End: 2019-09-16
Payer: MEDICARE

## 2019-09-16 PROCEDURE — 93298 REM INTERROG DEV EVAL SCRMS: CPT

## 2019-09-16 PROCEDURE — 93299: CPT

## 2019-10-17 ENCOUNTER — APPOINTMENT (OUTPATIENT)
Dept: ELECTROPHYSIOLOGY | Facility: CLINIC | Age: 64
End: 2019-10-17
Payer: MEDICARE

## 2019-10-17 PROCEDURE — 93298 REM INTERROG DEV EVAL SCRMS: CPT

## 2019-10-17 PROCEDURE — 93299: CPT

## 2019-10-24 ENCOUNTER — RX RENEWAL (OUTPATIENT)
Age: 64
End: 2019-10-24

## 2019-11-11 ENCOUNTER — MEDICATION RENEWAL (OUTPATIENT)
Age: 64
End: 2019-11-11

## 2019-11-19 ENCOUNTER — APPOINTMENT (OUTPATIENT)
Dept: ELECTROPHYSIOLOGY | Facility: CLINIC | Age: 64
End: 2019-11-19
Payer: MEDICARE

## 2019-11-19 PROCEDURE — 93298 REM INTERROG DEV EVAL SCRMS: CPT

## 2019-11-19 PROCEDURE — 93299: CPT

## 2019-11-22 ENCOUNTER — NON-APPOINTMENT (OUTPATIENT)
Age: 64
End: 2019-11-22

## 2019-11-22 ENCOUNTER — APPOINTMENT (OUTPATIENT)
Dept: PULMONOLOGY | Facility: CLINIC | Age: 64
End: 2019-11-22
Payer: MEDICARE

## 2019-11-22 VITALS
WEIGHT: 156 LBS | BODY MASS INDEX: 26.63 KG/M2 | HEART RATE: 53 BPM | DIASTOLIC BLOOD PRESSURE: 70 MMHG | HEIGHT: 64 IN | OXYGEN SATURATION: 95 % | SYSTOLIC BLOOD PRESSURE: 120 MMHG | RESPIRATION RATE: 16 BRPM

## 2019-11-22 PROCEDURE — 94010 BREATHING CAPACITY TEST: CPT

## 2019-11-22 PROCEDURE — 99214 OFFICE O/P EST MOD 30 MIN: CPT | Mod: 25

## 2019-11-22 PROCEDURE — 90662 IIV NO PRSV INCREASED AG IM: CPT

## 2019-11-22 PROCEDURE — G0008: CPT

## 2019-11-22 NOTE — HISTORY OF PRESENT ILLNESS
[FreeTextEntry1] : Mr. CROSS is a 64 year year old male with a history of cardiac disease, sarcoidosis, asthma, allergy, OSAS, s/p syncope (loop recorder) who presents for a follow-up pulmonary evaluation. His chief complaint is chest tightness.\par -he reports he has been feeling well, but has been having some chest tightness with the season\par -he reports having slightly itchy ears\par -he states he went to the ophthalmologist last week, for the first time in 6 years, and found a scarring in his eye. His vision is blurry occasionally in the morning\par -He notes His bowels are regular \par -he notes his senses of smell and taste are good \par -he reports his sleep quality is not good, and gets only periods of 1 hour of sleep. He notes he has an interstitial cystitis issue that wakes him up.\par -he is unsure if he snores\par -he reports having chronic joint pains, but it is stable\par -he denies taking any new medications, vitamins, or supplements, except CQ10\par -he notes he is not exercising, but is planning on returning to swimming. He notes he is walking regularly. He is unable to use weights due to his sternum, and is unable to run due to leg cramps\par -he denies any chest pain, chest pressure, diarrhea, constipation, dysphagia, dizziness, sour taste in the mouth, heartburn, reflux

## 2019-11-22 NOTE — ADDENDUM
[FreeTextEntry1] : Documented by Mauro Mejia acting as a scribe for Dr. Nazario Slaughter on 11/22/2019.\par \par All medical record entries made by the Scribe were at my, Dr. Nazario Slaughter's, direction and personally dictated by me on 11/22/2019. I have reviewed the chart and agree that the record accurately reflects my personal performance of the history, physical exam, assessment and plan. I have also personally directed, reviewed, and agree with the discharge instructions.

## 2019-11-22 NOTE — PROCEDURE
[FreeTextEntry1] : PFT revealed normal flows, with a FEV1 of 3.64L, which is 129% of predicted, with a normal flow volume loop \par \par Patient was unable to complete Feno / NiOx test.

## 2019-11-22 NOTE — PHYSICAL EXAM
[General Appearance - Well Developed] : well developed [Normal Appearance] : normal appearance [Well Groomed] : well groomed [General Appearance - Well Nourished] : well nourished [No Deformities] : no deformities [General Appearance - In No Acute Distress] : no acute distress [Normal Conjunctiva] : the conjunctiva exhibited no abnormalities [Eyelids - No Xanthelasma] : the eyelids demonstrated no xanthelasmas [Normal Oropharynx] : normal oropharynx [II] : II [Neck Appearance] : the appearance of the neck was normal [Neck Cervical Mass (___cm)] : no neck mass was observed [Jugular Venous Distention Increased] : there was no jugular-venous distention [Thyroid Diffuse Enlargement] : the thyroid was not enlarged [Thyroid Nodule] : there were no palpable thyroid nodules [Heart Rate And Rhythm] : heart rate and rhythm were normal [Heart Sounds] : normal S1 and S2 [Murmurs] : no murmurs present [Respiration, Rhythm And Depth] : normal respiratory rhythm and effort [Exaggerated Use Of Accessory Muscles For Inspiration] : no accessory muscle use [Auscultation Breath Sounds / Voice Sounds] : lungs were clear to auscultation bilaterally [Abdomen Soft] : soft [Abdomen Tenderness] : non-tender [Abdomen Mass (___ Cm)] : no abdominal mass palpated [Abnormal Walk] : normal gait [Gait - Sufficient For Exercise Testing] : the gait was sufficient for exercise testing [Nail Clubbing] : no clubbing of the fingernails [Cyanosis, Localized] : no localized cyanosis [Petechial Hemorrhages (___cm)] : no petechial hemorrhages [Skin Color & Pigmentation] : normal skin color and pigmentation [] : no rash [No Venous Stasis] : no venous stasis [Skin Lesions] : no skin lesions [No Skin Ulcers] : no skin ulcer [No Xanthoma] : no  xanthoma was observed [Deep Tendon Reflexes (DTR)] : deep tendon reflexes were 2+ and symmetric [Sensation] : the sensory exam was normal to light touch and pinprick [No Focal Deficits] : no focal deficits [Oriented To Time, Place, And Person] : oriented to person, place, and time [Impaired Insight] : insight and judgment were intact [Affect] : the affect was normal [FreeTextEntry1] : I:E 1:3; clear.

## 2019-11-22 NOTE — REVIEW OF SYSTEMS
[Chest Tightness] : chest tightness [Arthralgias] : arthralgias [As Noted in HPI] : as noted in HPI [Difficulty Initiating Sleep] : difficulty falling asleep [Difficulty Maintaining Sleep] : difficulty maintaining sleep [Negative] : Sleep Disorder [Chest Discomfort] : no chest discomfort [Heartburn] : no heartburn [Reflux] : no reflux [Dysphagia] : no dysphagia [Constipation] : no constipation [Diarrhea] : no diarrhea [Dizziness] : no dizziness

## 2019-11-22 NOTE — ASSESSMENT
[FreeTextEntry1] : Mr. Feliz is a 64 year old male with a history of cardiac disease, sarcoidosis, asthma, allergy, OSAS, s/p syncope (loop recorder) - stable. \par \par His shortness of breath is felt to be multifactorial due to: \par \par -chest pain syndrome being costochondritis \par -cardiac disease\par -poor breathing mechanics\par -out of shape\par -asthma (stable)\par -sarcoidosis\par \par problem 1: asthma -- stable\par -continue Breo 200 at 1 inhalation QD\par -continue Singulair 10 mg before bed\par -continue Spiriva 2 puffs QD \par -Asthma is  believed to be caused by inherited (genetic) and environmental factor, but its exact cause is unknown. Asthma may be triggered by allergens, lung infections, or irritants in the air. Asthma triggers are different for each person\par -Inhaler technique reviewed as well as oral hygiene techniques reviewed with patient. Avoidance of cold air, extremes of temperature, rescue inhaler should be used before exercise. Order of medication reviewed with patient. Recommended use of a cool mist humidifier in the bedroom.\par \par problem 2: allergic rhinitis/sinusitis (NONCOMPLIANT)\par -he is being recommended to use nasal saline - Navage.\par -continue Budesonide nasal spray 1 sniff each nostril BID\par -continue Olopatadine 1 sniff each nostril BID\par \par Environmental measures for allergies were encouraged including mattress and pillow cover, air purifier, and environmental controls. \par \par problem 3: GERD\par -continue Zantac 300mg QHS\par \par -Rule of 2's- Avoid eating too much, too late, too poorly, too spicy, or two hours before bed \par -Things to avoid including overeating, spicy foods, tight clothing, eating within three hours of bed, this list is not all inclusive. \par -For treatment of reflux, possible options discussed including diet control, H2 blockers, PPIs, as well as coating motility agents discussed as treatment options. Timing of meals and proximity of last meal to sleep were discussed. If symptoms persist, a formal gastrointestinal evaluation is needed.\par \par problem 4: NIKKY\par -he is being recommended an oral appliance and highly encourage to follow up with Dr. Nawaf Hoang; Bryn\par -Discussed the risks/associations with coronary artery disease, atrial fibrillation, arrhythmia, memory loss, issues with concentration, stroke risk, hypertension, nocturia, chronic reflux/Hassan’s esophagus some but not all inclusive. Treatment options discussed including CPAP/BiPAP machine, oral appliance, ProVent therapy, Oxy-Aid by [employer]pite[employer], new technologies, or positional sleep. \par \par problem 5: sarcoidosis (quiet)\par -?quiet at this point in time\par -continue yearly ophthalmological evaluations/PFTs\par \par -Sarcoidosis is a medical mystery and can present with very serious illness or little consequence. The disease can affect any organ including skin, liver, lymph glands, spleen, eyes, nervous system, musculoskeletal system, heart, brain, kidneys, and including the lungs. Pulmonary sarcoidosis can cause loss of lung volume and abnormal lung stiffness. This disease is characterized by presence of granulomas, small areas of inflamed cells. Sarcoidosis patients should have regular cardiac and eye examinations as well as regular PFTs.\par \par problem 6: cardiac disease\par -continue to follow up with the cardiologist Dr. Foster\par \par problem 7: overweight\par -Weight loss, exercise, and diet control were discussed and are highly encouraged. Treatment options were given such as, aqua therapy, and contacting a nutritionist. Recommended to use the elliptical, stationary bike, less use of treadmill.  Obesity is associated with worsening asthma, shortness of breath, and potential for cardiac disease, diabetes, and other underlying medical conditions. \par \par Problem 8: Costochondritis (Quiet)\par -recommended Topricin cream\par -recommend P6 cream\par \par Problem 9: health maintenance\par -continue Urology f/u\par -recommended a yearly flu shot after October 15 (11.22.19)\par -recommended strep pneumonia vaccines: Prevnar-13 vaccine, followed by Pneumo vaccine 23 on year following\par -recommended early intervention for URIs\par -recommended osteoporosis evaluations\par -recommended early dermatological evaluations\par -recommended after the age of 50 to the age of 70, colonoscopy every 5 years\par -encouraged early intervention\par \par F/U in 4 months with spi\par He is encouraged to call with any changes, concerns, or questions

## 2019-12-20 ENCOUNTER — NON-APPOINTMENT (OUTPATIENT)
Age: 64
End: 2019-12-20

## 2019-12-20 ENCOUNTER — APPOINTMENT (OUTPATIENT)
Dept: CARDIOLOGY | Facility: CLINIC | Age: 64
End: 2019-12-20
Payer: MEDICARE

## 2019-12-20 VITALS
BODY MASS INDEX: 26.78 KG/M2 | OXYGEN SATURATION: 100 % | DIASTOLIC BLOOD PRESSURE: 60 MMHG | SYSTOLIC BLOOD PRESSURE: 106 MMHG | WEIGHT: 156 LBS | HEART RATE: 47 BPM

## 2019-12-20 PROCEDURE — 99214 OFFICE O/P EST MOD 30 MIN: CPT

## 2019-12-20 PROCEDURE — 93000 ELECTROCARDIOGRAM COMPLETE: CPT

## 2019-12-20 NOTE — DISCUSSION/SUMMARY
[FreeTextEntry1] : 64 year old with with coronary disease and Sarcoid with a history of vasovagal syncope. \par Loop recorder in place without reported arrhythmia.\par Tolerating CAD meds. Check labs.\par Will continue repatha/asa/high dose statin.  \par

## 2019-12-20 NOTE — HISTORY OF PRESENT ILLNESS
[FreeTextEntry1] : "Not totally inactive"\par Taking Repatha as directed.  \par Feels good.\par No syncope. Nothing on loop recoreder.\par Walks a fair amount.and feels good, no chest pain but muscle aches as well.\par Back ache persists.\par \par \par

## 2019-12-20 NOTE — PHYSICAL EXAM
[General Appearance - Well Developed] : well developed [General Appearance - Well Nourished] : well nourished [Normal Conjunctiva] : the conjunctiva exhibited no abnormalities [No Oral Pallor] : no oral pallor [Normal Jugular Venous V Waves Present] : normal jugular venous V waves present [Auscultation Breath Sounds / Voice Sounds] : lungs were clear to auscultation bilaterally [Respiration, Rhythm And Depth] : normal respiratory rhythm and effort [Heart Sounds] : normal S1 and S2 [Murmurs] : no murmurs present [Abdomen Soft] : soft [Abnormal Walk] : normal gait [Cyanosis, Localized] : no localized cyanosis [Skin Turgor] : normal skin turgor [Oriented To Time, Place, And Person] : oriented to person, place, and time [Affect] : the affect was normal

## 2019-12-23 LAB
ALBUMIN SERPL ELPH-MCNC: 4.4 G/DL
ALP BLD-CCNC: 57 U/L
ALT SERPL-CCNC: 33 U/L
ANION GAP SERPL CALC-SCNC: 12 MMOL/L
AST SERPL-CCNC: 27 U/L
BASOPHILS # BLD AUTO: 0.05 K/UL
BASOPHILS NFR BLD AUTO: 1.1 %
BILIRUB SERPL-MCNC: 0.5 MG/DL
BUN SERPL-MCNC: 22 MG/DL
CALCIUM SERPL-MCNC: 9.5 MG/DL
CHLORIDE SERPL-SCNC: 101 MMOL/L
CHOLEST SERPL-MCNC: 122 MG/DL
CHOLEST/HDLC SERPL: 1.7 RATIO
CO2 SERPL-SCNC: 26 MMOL/L
CREAT SERPL-MCNC: 0.84 MG/DL
EOSINOPHIL # BLD AUTO: 0.33 K/UL
EOSINOPHIL NFR BLD AUTO: 7.1 %
GLUCOSE SERPL-MCNC: 96 MG/DL
HCT VFR BLD CALC: 50.1 %
HDLC SERPL-MCNC: 74 MG/DL
HGB BLD-MCNC: 16.2 G/DL
IMM GRANULOCYTES NFR BLD AUTO: 0.4 %
LDLC SERPL CALC-MCNC: 34 MG/DL
LYMPHOCYTES # BLD AUTO: 0.89 K/UL
LYMPHOCYTES NFR BLD AUTO: 19.3 %
MAN DIFF?: NORMAL
MCHC RBC-ENTMCNC: 31.1 PG
MCHC RBC-ENTMCNC: 32.3 GM/DL
MCV RBC AUTO: 96.2 FL
MONOCYTES # BLD AUTO: 0.38 K/UL
MONOCYTES NFR BLD AUTO: 8.2 %
NEUTROPHILS # BLD AUTO: 2.95 K/UL
NEUTROPHILS NFR BLD AUTO: 63.9 %
PLATELET # BLD AUTO: 151 K/UL
POTASSIUM SERPL-SCNC: 4.5 MMOL/L
PROT SERPL-MCNC: 6.2 G/DL
RBC # BLD: 5.21 M/UL
RBC # FLD: 13 %
SODIUM SERPL-SCNC: 139 MMOL/L
TRIGL SERPL-MCNC: 69 MG/DL
WBC # FLD AUTO: 4.62 K/UL

## 2019-12-30 ENCOUNTER — APPOINTMENT (OUTPATIENT)
Dept: ELECTROPHYSIOLOGY | Facility: CLINIC | Age: 64
End: 2019-12-30
Payer: MEDICARE

## 2019-12-30 PROCEDURE — 93299: CPT

## 2019-12-30 PROCEDURE — 93298 REM INTERROG DEV EVAL SCRMS: CPT

## 2020-01-16 DIAGNOSIS — Z86.39 PERSONAL HISTORY OF OTHER ENDOCRINE, NUTRITIONAL AND METABOLIC DISEASE: ICD-10-CM

## 2020-01-29 RX ORDER — EVOLOCUMAB 140 MG/ML
140 INJECTION, SOLUTION SUBCUTANEOUS
Qty: 2 | Refills: 5 | Status: DISCONTINUED | COMMUNITY
Start: 2019-02-19 | End: 2020-01-29

## 2020-01-30 ENCOUNTER — APPOINTMENT (OUTPATIENT)
Dept: ELECTROPHYSIOLOGY | Facility: CLINIC | Age: 65
End: 2020-01-30
Payer: MEDICARE

## 2020-01-30 PROCEDURE — 93298 REM INTERROG DEV EVAL SCRMS: CPT

## 2020-01-30 PROCEDURE — G2066: CPT

## 2020-02-07 ENCOUNTER — APPOINTMENT (OUTPATIENT)
Dept: UROLOGY | Facility: CLINIC | Age: 65
End: 2020-02-07
Payer: MEDICARE

## 2020-02-07 VITALS
HEIGHT: 64 IN | WEIGHT: 156 LBS | OXYGEN SATURATION: 99 % | SYSTOLIC BLOOD PRESSURE: 109 MMHG | DIASTOLIC BLOOD PRESSURE: 67 MMHG | BODY MASS INDEX: 26.63 KG/M2 | TEMPERATURE: 97.8 F | HEART RATE: 46 BPM

## 2020-02-07 PROCEDURE — 99215 OFFICE O/P EST HI 40 MIN: CPT | Mod: 25

## 2020-02-07 PROCEDURE — 51798 US URINE CAPACITY MEASURE: CPT

## 2020-02-10 LAB
APPEARANCE: CLEAR
BACTERIA UR CULT: NORMAL
BACTERIA: NEGATIVE
BILIRUBIN URINE: NEGATIVE
BLOOD URINE: NEGATIVE
COLOR: YELLOW
GLUCOSE QUALITATIVE U: NEGATIVE
HYALINE CASTS: 0 /LPF
KETONES URINE: NEGATIVE
LEUKOCYTE ESTERASE URINE: NEGATIVE
MICROSCOPIC-UA: NORMAL
NITRITE URINE: NEGATIVE
PH URINE: 6
PROTEIN URINE: NEGATIVE
RED BLOOD CELLS URINE: 1 /HPF
SPECIFIC GRAVITY URINE: 1.02
SQUAMOUS EPITHELIAL CELLS: 0 /HPF
UROBILINOGEN URINE: NORMAL
WHITE BLOOD CELLS URINE: 0 /HPF

## 2020-02-24 ENCOUNTER — APPOINTMENT (OUTPATIENT)
Dept: UROLOGY | Facility: CLINIC | Age: 65
End: 2020-02-24

## 2020-02-28 ENCOUNTER — APPOINTMENT (OUTPATIENT)
Dept: UROLOGY | Facility: CLINIC | Age: 65
End: 2020-02-28
Payer: MEDICARE

## 2020-02-28 LAB
APPEARANCE: CLEAR
BACTERIA: NEGATIVE
BILIRUBIN URINE: NEGATIVE
BLOOD URINE: NEGATIVE
COLOR: YELLOW
GLUCOSE QUALITATIVE U: NEGATIVE
HYALINE CASTS: 1 /LPF
KETONES URINE: NEGATIVE
LEUKOCYTE ESTERASE URINE: NEGATIVE
MICROSCOPIC-UA: NORMAL
NITRITE URINE: NEGATIVE
PH URINE: 5.5
PROTEIN URINE: NEGATIVE
RED BLOOD CELLS URINE: 3 /HPF
SPECIFIC GRAVITY URINE: 1.02
SQUAMOUS EPITHELIAL CELLS: 0 /HPF
UROBILINOGEN URINE: NORMAL
WHITE BLOOD CELLS URINE: 1 /HPF

## 2020-02-28 PROCEDURE — 99214 OFFICE O/P EST MOD 30 MIN: CPT

## 2020-03-02 ENCOUNTER — APPOINTMENT (OUTPATIENT)
Dept: ELECTROPHYSIOLOGY | Facility: CLINIC | Age: 65
End: 2020-03-02
Payer: MEDICARE

## 2020-03-02 ENCOUNTER — NON-APPOINTMENT (OUTPATIENT)
Age: 65
End: 2020-03-02

## 2020-03-02 ENCOUNTER — FORM ENCOUNTER (OUTPATIENT)
Age: 65
End: 2020-03-02

## 2020-03-02 ENCOUNTER — APPOINTMENT (OUTPATIENT)
Dept: PULMONOLOGY | Facility: CLINIC | Age: 65
End: 2020-03-02
Payer: MEDICARE

## 2020-03-02 VITALS
OXYGEN SATURATION: 95 % | DIASTOLIC BLOOD PRESSURE: 80 MMHG | RESPIRATION RATE: 16 BRPM | WEIGHT: 158 LBS | SYSTOLIC BLOOD PRESSURE: 120 MMHG | HEART RATE: 63 BPM | BODY MASS INDEX: 26.98 KG/M2 | HEIGHT: 64 IN

## 2020-03-02 PROCEDURE — G2066: CPT

## 2020-03-02 PROCEDURE — 94010 BREATHING CAPACITY TEST: CPT

## 2020-03-02 PROCEDURE — 93298 REM INTERROG DEV EVAL SCRMS: CPT

## 2020-03-02 PROCEDURE — 99214 OFFICE O/P EST MOD 30 MIN: CPT | Mod: 25

## 2020-03-02 RX ORDER — FLUTICASONE PROPIONATE 50 UG/1
50 SPRAY, METERED NASAL
Qty: 1 | Refills: 3 | Status: ACTIVE | COMMUNITY
Start: 2020-03-02 | End: 1900-01-01

## 2020-03-02 NOTE — HISTORY OF PRESENT ILLNESS
[TextBox_4] : Mr. CROSS is a 65 year year old male with a history of cardiac disease, sarcoidosis, asthma, allergy, OSAS, s/p syncope (loop recorder) who presents for an acute pulmonary evaluation.\par \par His chief complaint is sinus congestion x 1 week.\par \par Patient states that last week he felt congestion starting in his left nostril and then it worsened to bilateral nasal congestion.  He notes that when he blows his nose his nasal mucus has been a light yellow color and one time he noticed thick, darkened yellow mucus. Patient also c/o headache and post-nasal drip. He notes he does have a chronic sinus issue history and he does have an established ENT, but has not followed up with him. He does admit to very slight wheezing, but states that it's his baseline. \par \par He presented to [facility] on Friday 2/28 and was treated for Sinusitis with Augmentin PO BID x 10 days.  He notes since he started Augmentin his body has been itchy all over.  He notes he has tolerated Penicillin family antibiotic in the past with no issues, but because of this itchiness he would like to switch off of Augmentin. Pt states he does have some fluticasone and azelastine nasal spray at home that he uses intermittently, not daily. \par \par He denies fever/chills, fatigue, decreased appetite, cough, SOB @ rest or exertional, reflux, dizziness, or N/V/D.

## 2020-03-02 NOTE — ASSESSMENT
[FreeTextEntry1] : The plan for the patient is as follows:\par \par 1. Asthma, stable:\par - Patient not currently on daily maintenance inhaler regimen as previously RX'ed by Dr. Slaughter.\par \par 2. Sinusitis:\par - Patient to discontinue Augmentin d/t possible reaction.\par - Add Doxycycline 100 mg BID x 7 days.\par \par 3. Post-nasal drip:\par - Increase use of Azelastine nasal spray; 1 spray each nostril BID.\par - Increase use of Fluticasone nasal spray; 1 spray each nostril BID. \par \par 4. GERD, stable:\par - Diet controlled.\par \par 5. NIKKY - noncompliant on treatment.\par - Pt states he trialed CPAP and could not tolerate it.\par - Recommended Oral appliance. \par \par 6. Sarcoidosis:\par - Encouraged yearly opthalmology visit.\par - Pt to discuss possibly updated CT scan on 3/23 visit with Dr. Slaughter.\par \par Pt to follow-up as scheduled fro 3/23 @ 1345 with Dr. Slaughter.\par Pt to call with further questions or concerns.\par Pt verbalizes understanding.

## 2020-03-02 NOTE — PHYSICAL EXAM
[No Acute Distress] : no acute distress [Normal Oropharynx] : normal oropharynx [II] : Mallampati Class: II [Normal Appearance] : normal appearance [No Neck Mass] : no neck mass [Normal Rate/Rhythm] : normal rate/rhythm [Normal S1, S2] : normal s1, s2 [No Murmurs] : no murmurs [No Resp Distress] : no resp distress [Clear to Auscultation Bilaterally] : clear to auscultation bilaterally [No Abnormalities] : no abnormalities [Normal Gait] : normal gait [No Clubbing] : no clubbing [No Cyanosis] : no cyanosis [No Edema] : no edema [Normal Color/ Pigmentation] : normal color/ pigmentation [FROM] : FROM [Normal Affect] : normal affect [Oriented x3] : oriented x3

## 2020-03-02 NOTE — REVIEW OF SYSTEMS
[Nasal Congestion] : nasal congestion [Postnasal Drip] : postnasal drip [Sinus Problems] : sinus problems [Wheezing] : wheezing [Headache] : headache [Negative] : Endocrine [Fatigue] : no fatigue [Fever] : no fever [Chills] : no chills [Poor Appetite] : no poor appetite [Cough] : no cough [Chest Tightness] : no chest tightness [Sputum] : no sputum [SOB on Exertion] : no sob on exertion [Dyspnea] : no dyspnea [Dizziness] : no dizziness

## 2020-03-03 ENCOUNTER — APPOINTMENT (OUTPATIENT)
Dept: ULTRASOUND IMAGING | Facility: CLINIC | Age: 65
End: 2020-03-03
Payer: MEDICARE

## 2020-03-03 ENCOUNTER — OUTPATIENT (OUTPATIENT)
Dept: OUTPATIENT SERVICES | Facility: HOSPITAL | Age: 65
LOS: 1 days | End: 2020-03-03
Payer: MEDICARE

## 2020-03-03 DIAGNOSIS — N20.0 CALCULUS OF KIDNEY: ICD-10-CM

## 2020-03-03 DIAGNOSIS — R35.0 FREQUENCY OF MICTURITION: ICD-10-CM

## 2020-03-03 DIAGNOSIS — Z98.89 OTHER SPECIFIED POSTPROCEDURAL STATES: Chronic | ICD-10-CM

## 2020-03-03 PROCEDURE — 76770 US EXAM ABDO BACK WALL COMP: CPT

## 2020-03-03 PROCEDURE — 76775 US EXAM ABDO BACK WALL LIM: CPT

## 2020-03-03 PROCEDURE — 76770 US EXAM ABDO BACK WALL COMP: CPT | Mod: 26

## 2020-03-03 PROCEDURE — 76775 US EXAM ABDO BACK WALL LIM: CPT | Mod: 26

## 2020-03-04 LAB — BACTERIA UR CULT: NORMAL

## 2020-03-09 ENCOUNTER — APPOINTMENT (OUTPATIENT)
Dept: UROLOGY | Facility: CLINIC | Age: 65
End: 2020-03-09

## 2020-03-19 ENCOUNTER — RX RENEWAL (OUTPATIENT)
Age: 65
End: 2020-03-19

## 2020-04-02 ENCOUNTER — APPOINTMENT (OUTPATIENT)
Dept: ELECTROPHYSIOLOGY | Facility: CLINIC | Age: 65
End: 2020-04-02
Payer: MEDICARE

## 2020-04-02 PROCEDURE — G2066: CPT

## 2020-04-02 PROCEDURE — 93298 REM INTERROG DEV EVAL SCRMS: CPT

## 2020-04-06 ENCOUNTER — APPOINTMENT (OUTPATIENT)
Dept: PULMONOLOGY | Facility: CLINIC | Age: 65
End: 2020-04-06
Payer: MEDICARE

## 2020-04-06 PROCEDURE — 99214 OFFICE O/P EST MOD 30 MIN: CPT | Mod: 95

## 2020-04-06 RX ORDER — DESLORATADINE 5 MG/1
5 TABLET ORAL
Qty: 90 | Refills: 1 | Status: DISCONTINUED | COMMUNITY
Start: 2020-04-06 | End: 2020-04-06

## 2020-04-06 RX ORDER — DOXYCYCLINE 100 MG/1
100 CAPSULE ORAL TWICE DAILY
Qty: 14 | Refills: 0 | Status: DISCONTINUED | COMMUNITY
Start: 2020-03-02 | End: 2020-04-06

## 2020-04-06 RX ORDER — FLUTICASONE FUROATE AND VILANTEROL TRIFENATATE 200; 25 UG/1; UG/1
200-25 POWDER RESPIRATORY (INHALATION) DAILY
Qty: 3 | Refills: 1 | Status: DISCONTINUED | COMMUNITY
Start: 2018-05-22 | End: 2020-04-06

## 2020-04-06 RX ORDER — FLUTICASONE FUROATE AND VILANTEROL TRIFENATATE 200; 25 UG/1; UG/1
200-25 POWDER RESPIRATORY (INHALATION) DAILY
Qty: 30 | Refills: 3 | Status: DISCONTINUED | COMMUNITY
Start: 2017-05-19 | End: 2020-04-06

## 2020-04-20 NOTE — PHYSICAL EXAM
[No Acute Distress] : no acute distress [Well Nourished] : well nourished [Normal Appearance] : normal appearance [Well Groomed] : well groomed [No Deformities] : no deformities [Well Developed] : well developed [TextBox_2] : appears well ; no distress.

## 2020-04-20 NOTE — ADDENDUM
[FreeTextEntry1] : Documented by Dre Scherer acting as a scribe for Dr. Nazario Slaughter on 04/06/2020 \par \par All medical record entries made by the Scribe were at my, Dr. Nazario Slaughter's, direction and personally dictated by me on 04/06/2020 . I have reviewed the chart and agree that the record accurately reflects my personal performance of the history, physical exam, assessment and plan. I have also personally directed, reviewed, and agree with the discharge instructions.

## 2020-04-20 NOTE — REASON FOR VISIT
[Follow-Up] : a follow-up visit [FreeTextEntry1] : Video consult - OSAS, sarcoidosis, asthma, allergy, OSAS, s/p syncope (loop recorder)

## 2020-04-20 NOTE — HISTORY OF PRESENT ILLNESS
[Home] : at home, [unfilled] , at the time of the visit. [Medical Office: ([facility])___] : at the medical office located in  [Patient] : the patient [Self] : self [FreeTextEntry2] : KEVIN CROSS  [FreeTextEntry1] : Mr. CROSS is a 65 year year old male with a history of cardiac disease, sarcoidosis, asthma, allergy, OSAS, s/p syncope (loop recorder) who presents today via video for a follow-up pulmonary evaluation. His chief complaint is headache and cough.\par -generally has been feeling well. \par -did have a cough that comes and goes. \par -has been having a migraine headache for the past 5 weeks ; started after the Doxycycline.\par -no events with the loop recorder in place. \par -sleep is okay, is getting enough sleep, does not snore. \par -has itchy eyes and itchy ears.\par -had an Asthma sensation ; had some wheeze. \par -Sense of Smell is good. \par -he is concerned with getting Covid since he does have sarcoidosis. \par  \par -He denies any chest pain, chest pressure, diarrhea, constipation, dysphagia, dizziness, sour taste in the mouth, leg swelling, leg pain, heartburn, reflux, myalgias or arthralgias.

## 2020-04-20 NOTE — ASSESSMENT
[FreeTextEntry1] : Mr. Feliz is a 65 year old male with a history of cardiac disease, sarcoidosis, asthma, allergy, OSAS, s/p syncope (loop recorder) presenting today via video. Number one issue is sinus / Headaches \par \par His shortness of breath is felt to be multifactorial due to: \par \par -chest pain syndrome being costochondritis \par -cardiac disease\par -poor breathing mechanics\par -out of shape\par -asthma (stable)\par -sarcoidosis\par \par problem 1: asthma -- active \par -Add Alvesco (160) 2 inhalations BID \par -Add Symbicort (160) 2 inhalations BID \par -continue Singulair 10 mg before bed\par -continue Spiriva 2 puffs QD \par -Asthma is  believed to be caused by inherited (genetic) and environmental factor, but its exact cause is unknown. Asthma may be triggered by allergens, lung infections, or irritants in the air. Asthma triggers are different for each person\par -Inhaler technique reviewed as well as oral hygiene techniques reviewed with patient. Avoidance of cold air, extremes of temperature, rescue inhaler should be used before exercise. Order of medication reviewed with patient. Recommended use of a cool mist humidifier in the bedroom.\par \par problem 2: allergic rhinitis/sinusitis (NONCOMPLIANT) -active\par -Add Clarinex 5mg QHS \par -he is being recommended to use nasal saline - Navage.\par -continue Budesonide nasal spray 1 sniff each nostril BID\par -continue Olopatadine 1 sniff each nostril BID\par \par Environmental measures for allergies were encouraged including mattress and pillow cover, air purifier, and environmental controls. \par \par problem 3: GERD\par -continue Zantac 300mg QHS\par \par -Rule of 2's- Avoid eating too much, too late, too poorly, too spicy, or two hours before bed \par -Things to avoid including overeating, spicy foods, tight clothing, eating within three hours of bed, this list is not all inclusive. \par -For treatment of reflux, possible options discussed including diet control, H2 blockers, PPIs, as well as coating motility agents discussed as treatment options. Timing of meals and proximity of last meal to sleep were discussed. If symptoms persist, a formal gastrointestinal evaluation is needed.\par \par problem 4: NIKKY\par -he is being recommended an oral appliance and highly encourage to follow up with Dr. Nawaf Hoang; Bryn\par -Discussed the risks/associations with coronary artery disease, atrial fibrillation, arrhythmia, memory loss, issues with concentration, stroke risk, hypertension, nocturia, chronic reflux/Hassan’s esophagus some but not all inclusive. Treatment options discussed including CPAP/BiPAP machine, oral appliance, ProVent therapy, Oxy-Aid by [employer], new technologies, or positional sleep. \par \par problem 5: sarcoidosis (quiet)\par -?quiet at this point in time\par -continue yearly ophthalmological evaluations/PFTs\par \par -Sarcoidosis is a medical mystery and can present with very serious illness or little consequence. The disease can affect any organ including skin, liver, lymph glands, spleen, eyes, nervous system, musculoskeletal system, heart, brain, kidneys, and including the lungs. Pulmonary sarcoidosis can cause loss of lung volume and abnormal lung stiffness. This disease is characterized by presence of granulomas, small areas of inflamed cells. Sarcoidosis patients should have regular cardiac and eye examinations as well as regular PFTs.\par \par problem 6: cardiac disease\par -continue to follow up with the cardiologist Dr. Foster\par \par problem 7: overweight\par -Weight loss, exercise, and diet control were discussed and are highly encouraged. Treatment options were given such as, aqua therapy, and contacting a nutritionist. Recommended to use the elliptical, stationary bike, less use of treadmill.  Obesity is associated with worsening asthma, shortness of breath, and potential for cardiac disease, diabetes, and other underlying medical conditions. \par \par Problem 8: Costochondritis (Quiet)\par -recommended Topricin cream\par -recommend P6 cream\par \par Problem 9: Health Maintenance/COVID19 Precautions:\par \par - Clean your hands often. Wash your hands often with soap and water for at least 20 seconds, especially after blowing your nose, coughing, or sneezing, or having been in a public place.\par - If soap and water are not available, use a hand  that contains at least 60% alcohol.\par - To the extent possible, avoid touching high-touch surfaces in public places - elevator buttons, door handles, handrails, handshaking with people, etc. Use a tissue or your sleeve to cover your hand or finger if you must touch something.\par - Wash your hands after touching surfaces in public places.\par - Avoid touching your face, nose, eyes, etc.\par - Clean and disinfect your home to remove germs: practice routine cleaning of frequently touched surfaces (for example: tables, doorknobs, light switches, handles, desks, toilets, faucets, sinks & cell phones)\par - Avoid crowds, especially in poorly ventilated spaces. Your risk of exposure to respiratory viruses like COVID-19 may increase in crowded, closed-in settings with little air circulation if\par there are people in the crowd who are sick. All patients are recommended to practice social distancing and stay at least 6 feet away from others.\par - Avoid all non-essential travel including plane trips, and especially avoid embarking on cruise ships.\par -If COVID-19 is spreading in your community, take extra measures to put distance between yourself and other people to further reduce your risk of being exposed to this new virus.\par -Stay home as much as possible.\par - Consider ways of getting food brought to your house through family, social, or commercial networks\par -Be aware that the virus has been known to live in the air up to 3 hours post exposure, cardboard up to 24 hours post exposure, copper up to 4 hours post exposure, steel and plastic up to 2-3 days post exposure. Risk of transmission from these surfaces are affected by many variables.\par \par Immune Support Recommendations:\par \par -OTC Vitamin C 500mg BID \par -OTC Quercetin 250-500mg BID \par -OTC Zinc 75-100mg per day \par -OTC Melatonin 1or 2mg a night \par -OTC Vitamin D 1-4000mg per day \par \par Asthma and COVID19:\par \par You need to make sure your asthma is under control. This often requires the use of inhaled corticosteroids (and sometimes oral corticosteroids). Inhaled corticosteroids do not likely reduce your immune system’s ability to fight infections, but oral corticosteroids may. It is important to use the steps above to protect yourself to limit your exposure to any respiratory virus. \par \par Problem 10: health maintenance\par -continue Urology f/u\par -recommended a yearly flu shot after October 15 (11.22.19)\par -recommended strep pneumonia vaccines: Prevnar-13 vaccine, followed by Pneumo vaccine 23 on year following\par -recommended early intervention for URIs\par -recommended osteoporosis evaluations\par -recommended early dermatological evaluations\par -recommended after the age of 50 to the age of 70, colonoscopy every 5 years\par -encouraged early intervention\par \par F/U in 4 months with spi\par He is encouraged to call with any changes, concerns, or questions

## 2020-05-01 ENCOUNTER — APPOINTMENT (OUTPATIENT)
Dept: PULMONOLOGY | Facility: CLINIC | Age: 65
End: 2020-05-01
Payer: MEDICARE

## 2020-05-01 DIAGNOSIS — J32.9 CHRONIC SINUSITIS, UNSPECIFIED: ICD-10-CM

## 2020-05-01 PROCEDURE — 99214 OFFICE O/P EST MOD 30 MIN: CPT | Mod: 95

## 2020-05-01 RX ORDER — BECLOMETHASONE DIPROPIONATE 80 UG/1
80 AEROSOL, METERED NASAL
Qty: 3 | Refills: 1 | Status: COMPLETED | COMMUNITY
Start: 2020-05-01 | End: 2020-05-01

## 2020-05-01 RX ORDER — DESLORATADINE 5 MG/1
5 TABLET ORAL
Qty: 90 | Refills: 1 | Status: DISCONTINUED | COMMUNITY
Start: 2020-05-01 | End: 2020-05-01

## 2020-05-01 NOTE — PHYSICAL EXAM
[No Acute Distress] : no acute distress [Well Nourished] : well nourished [Normal Appearance] : normal appearance [Well Groomed] : well groomed [No Deformities] : no deformities [Well Developed] : well developed

## 2020-05-01 NOTE — ASSESSMENT
[FreeTextEntry1] : Mr. Feliz is a 65 year old male with a history of cardiac disease, sarcoidosis, asthma, allergy, OSAS, s/p syncope (loop recorder) presenting today via video. Number one issue is sinus / Headaches \par \par His shortness of breath is felt to be multifactorial due to: \par -chest pain syndrome being costochondritis \par -cardiac disease\par -poor breathing mechanics\par -out of shape\par -asthma (stable)\par -sarcoidosis\par \par problem 1: asthma (active)- noncompliant \par - add Prednisone 20 mg x 7 days, 10 mg x 7 days\par Information sheet given to the patient to be reviewed, this medication is never to be used without consulting the prescribing physician. Proper dietary restraint is necessary specifically salt containing foods, if any reaction may occur should be reported. \par -Add Alvesco 160 2 inhalations BID \par -Continue Symbicort 160 2 inhalations BID \par -continue Singulair 10 mg before bed\par -continue Spiriva 2 puffs QD \par -Asthma is  believed to be caused by inherited (genetic) and environmental factor, but its exact cause is unknown. Asthma may be triggered by allergens, lung infections, or irritants in the air. Asthma triggers are different for each person\par -Inhaler technique reviewed as well as oral hygiene techniques reviewed with patient. Avoidance of cold air, extremes of temperature, rescue inhaler should be used before exercise. Order of medication reviewed with patient. Recommended use of a cool mist humidifier in the bedroom.\par \par problem 2: allergic rhinitis/sinusitis (NONCOMPLIANT) -active\par - add Astelin 0.15% 1 sniff/nostril BID\par -Continue Clarinex 5mg QHS \par -he is being recommended to use nasal saline - Navage.\par -continue Budesonide nasal spray 1 sniff each nostril BID or Qnasl 1 sniff BID\par -continue Olopatadine 1 sniff each nostril BID\par \par Environmental measures for allergies were encouraged including mattress and pillow cover, air purifier, and environmental controls. \par \par problem 3: GERD\par - add Pepcid 40 mg QHS\par -Rule of 2's- Avoid eating too much, too late, too poorly, too spicy, or two hours before bed \par -Things to avoid including overeating, spicy foods, tight clothing, eating within three hours of bed, this list is not all inclusive. \par -For treatment of reflux, possible options discussed including diet control, H2 blockers, PPIs, as well as coating motility agents discussed as treatment options. Timing of meals and proximity of last meal to sleep were discussed. If symptoms persist, a formal gastrointestinal evaluation is needed.\par \par problem 4: NIKKY\par -he is being recommended an oral appliance and highly encourage to follow up with Dr. Nawaf Clemente\par -Discussed the risks/associations with coronary artery disease, atrial fibrillation, arrhythmia, memory loss, issues with concentration, stroke risk, hypertension, nocturia, chronic reflux/Hassan’s esophagus some but not all inclusive. Treatment options discussed including CPAP/BiPAP machine, oral appliance, ProVent therapy, Oxy-Aid by [employer], new technologies, or positional sleep. \par \par problem 5: sarcoidosis (quiet)\par -?quiet at this point in time\par -continue yearly ophthalmological evaluations/PFTs\par \par -Sarcoidosis is a medical mystery and can present with very serious illness or little consequence. The disease can affect any organ including skin, liver, lymph glands, spleen, eyes, nervous system, musculoskeletal system, heart, brain, kidneys, and including the lungs. Pulmonary sarcoidosis can cause loss of lung volume and abnormal lung stiffness. This disease is characterized by presence of granulomas, small areas of inflamed cells. Sarcoidosis patients should have regular cardiac and eye examinations as well as regular PFTs.\par \par problem 6: cardiac disease\par -continue to follow up with the cardiologist Dr. Foster\par \par problem 7: overweight\par -Weight loss, exercise, and diet control were discussed and are highly encouraged. Treatment options were given such as, aqua therapy, and contacting a nutritionist. Recommended to use the elliptical, stationary bike, less use of treadmill.  Obesity is associated with worsening asthma, shortness of breath, and potential for cardiac disease, diabetes, and other underlying medical conditions. \par \par Problem 8: Costochondritis (Quiet)\par -recommended Topricin cream\par -recommend P6 cream\par \par Problem 9: Health Maintenance/COVID19 Precautions:\par \par - Clean your hands often. Wash your hands often with soap and water for at least 20 seconds, especially after blowing your nose, coughing, or sneezing, or having been in a public place.\par - If soap and water are not available, use a hand  that contains at least 60% alcohol.\par - To the extent possible, avoid touching high-touch surfaces in public places - elevator buttons, door handles, handrails, handshaking with people, etc. Use a tissue or your sleeve to cover your hand or finger if you must touch something.\par - Wash your hands after touching surfaces in public places.\par - Avoid touching your face, nose, eyes, etc.\par - Clean and disinfect your home to remove germs: practice routine cleaning of frequently touched surfaces (for example: tables, doorknobs, light switches, handles, desks, toilets, faucets, sinks & cell phones)\par - Avoid crowds, especially in poorly ventilated spaces. Your risk of exposure to respiratory viruses like COVID-19 may increase in crowded, closed-in settings with little air circulation if\par there are people in the crowd who are sick. All patients are recommended to practice social distancing and stay at least 6 feet away from others.\par - Avoid all non-essential travel including plane trips, and especially avoid embarking on cruise ships.\par -If COVID-19 is spreading in your community, take extra measures to put distance between yourself and other people to further reduce your risk of being exposed to this new virus.\par -Stay home as much as possible.\par - Consider ways of getting food brought to your house through family, social, or commercial networks\par -Be aware that the virus has been known to live in the air up to 3 hours post exposure, cardboard up to 24 hours post exposure, copper up to 4 hours post exposure, steel and plastic up to 2-3 days post exposure. Risk of transmission from these surfaces are affected by many variables.\par \par Immune Support Recommendations:\par \par -OTC Vitamin C 500mg BID \par -OTC Quercetin 250-500mg BID \par -OTC Zinc 75-100mg per day \par -OTC Melatonin 1or 2mg a night \par -OTC Vitamin D 1-4000mg per day \par \par Asthma and COVID19:\par \par You need to make sure your asthma is under control. This often requires the use of inhaled corticosteroids (and sometimes oral corticosteroids). Inhaled corticosteroids do not likely reduce your immune system’s ability to fight infections, but oral corticosteroids may. It is important to use the steps above to protect yourself to limit your exposure to any respiratory virus. \par \par Problem 10: health maintenance\par -continue Urology f/u\par -recommended a yearly flu shot after October 15 (11.22.19)\par -recommended strep pneumonia vaccines: Prevnar-13 vaccine, followed by Pneumo vaccine 23 on year following\par -recommended early intervention for URIs\par -recommended osteoporosis evaluations\par -recommended early dermatological evaluations\par -recommended after the age of 50 to the age of 70, colonoscopy every 5 years\par -encouraged early intervention\par \par F/U in 4 months with spi\par He is encouraged to call with any changes, concerns, or questions

## 2020-05-01 NOTE — ADDENDUM
[FreeTextEntry1] : Documented by Sabina Horvath acting as a scribe for Dr. Nazario Slaughter on (05/01/2020).\par \par All medical record entries made by the Scribe were at my, Dr. Nazario Slaughter's, direction and personally dictated by me on (05/01/2020). I have reviewed the chart and agree that the record accurately reflects my personal performance of the history, physical exam, assessment and plan. I have also personally directed, reviewed, and agree with the discharge instructions. \par \par \par

## 2020-05-01 NOTE — HISTORY OF PRESENT ILLNESS
[Home] : at home, [unfilled] , at the time of the visit. [Medical Office: ([facility])___] : at the medical office located in  [Patient] : the patient [Self] : self [FreeTextEntry1] : Mr. CROSS is a 65 year year old male with a history of cardiac disease, sarcoidosis, asthma, allergy, OSAS, s/p syncope (loop recorder) who presents today via video for a follow-up pulmonary evaluation. His chief complaint is cough.\par - Overall he is not feeling great \par - He has expressed anxiety over the current pandemic \par - He currently has a cough\par - He took Mucinex which was not as effective in relieving his cough as it usually is \par - He has intermittent headaches\par - He has some SOB \par - He feels congested \par - He has some difficulty swallowing pills\par - He is not wheezing \par - Chest tightness\par - Sinuses are active \par - denies any nausea, vomiting, fever, chills, sweats, diarrhea, constipation, dizziness, leg swelling, leg pain, itchy eyes, itchy ears, heartburn, reflux, or sour taste in the mouth.\par \par \par   [FreeTextEntry2] : KEVIN CROSS

## 2020-05-04 ENCOUNTER — APPOINTMENT (OUTPATIENT)
Dept: ELECTROPHYSIOLOGY | Facility: CLINIC | Age: 65
End: 2020-05-04
Payer: MEDICARE

## 2020-05-04 PROCEDURE — 93298 REM INTERROG DEV EVAL SCRMS: CPT

## 2020-05-04 PROCEDURE — G2066: CPT

## 2020-06-04 ENCOUNTER — APPOINTMENT (OUTPATIENT)
Dept: ELECTROPHYSIOLOGY | Facility: CLINIC | Age: 65
End: 2020-06-04
Payer: MEDICARE

## 2020-06-04 PROCEDURE — 93298 REM INTERROG DEV EVAL SCRMS: CPT

## 2020-06-04 PROCEDURE — G2066: CPT

## 2020-06-09 ENCOUNTER — RX RENEWAL (OUTPATIENT)
Age: 65
End: 2020-06-09

## 2020-06-17 ENCOUNTER — APPOINTMENT (OUTPATIENT)
Dept: CARDIOLOGY | Facility: CLINIC | Age: 65
End: 2020-06-17

## 2020-06-18 ENCOUNTER — APPOINTMENT (OUTPATIENT)
Dept: UROLOGY | Facility: CLINIC | Age: 65
End: 2020-06-18

## 2020-06-22 ENCOUNTER — APPOINTMENT (OUTPATIENT)
Dept: PULMONOLOGY | Facility: CLINIC | Age: 65
End: 2020-06-22

## 2020-06-29 ENCOUNTER — APPOINTMENT (OUTPATIENT)
Dept: PULMONOLOGY | Facility: CLINIC | Age: 65
End: 2020-06-29
Payer: MEDICARE

## 2020-06-29 VITALS
BODY MASS INDEX: 25.61 KG/M2 | HEART RATE: 59 BPM | HEIGHT: 64 IN | SYSTOLIC BLOOD PRESSURE: 140 MMHG | DIASTOLIC BLOOD PRESSURE: 80 MMHG | OXYGEN SATURATION: 93 % | RESPIRATION RATE: 16 BRPM | TEMPERATURE: 97.8 F | WEIGHT: 150 LBS

## 2020-06-29 DIAGNOSIS — Z01.812 ENCOUNTER FOR PREPROCEDURAL LABORATORY EXAMINATION: ICD-10-CM

## 2020-06-29 DIAGNOSIS — Z91.14 PATIENT'S OTHER NONCOMPLIANCE WITH MEDICATION REGIMEN: ICD-10-CM

## 2020-06-29 PROCEDURE — 71046 X-RAY EXAM CHEST 2 VIEWS: CPT | Mod: 26

## 2020-06-29 PROCEDURE — 99214 OFFICE O/P EST MOD 30 MIN: CPT | Mod: 25

## 2020-06-29 NOTE — ASSESSMENT
[FreeTextEntry1] : The plan for the patient is as follows:\par \par problem 1: asthma (active)- noncompliant \par - add Prednisone 20 mg x 7 days, 10 mg x 7 days (can use after COVID19 swab results are back)\par Information sheet given to the patient to be reviewed, this medication is never to be used without consulting the prescribing physician. Proper dietary restraint is necessary specifically salt containing foods, if any reaction may occur should be reported. \par -Add Symbicort 160 2 inhalations BID rinse and gargle\par -Add Spiriva 2 puffs QD rinse and gargle (sample given)\par \par problem 2: allergic rhinitis/sinusitis hx with excessive nasal dryness, hx of nasal polyps \par -follow up as scheduled with ENT\par -Hold azelastine\par -off desloratasine\par -Continue nettipot use\par -add xlear or AYR saline gel\par -continue mometasone nasal spray 1 spray each nostril BID\par \par problem 3: GERD with excess reflux at night\par - increase OTC pepcid to 40 mg after dinner. \par -if no better, consider PPI. \par \par problem 4: sarcoidosis hx\par -?quiet at this point in time\par -continue yearly ophthalmological evaluations/PFTs needed\par -CXR was stable for patient\par -CT chest needed\par -Sarcoidosis is a medical mystery and can present with very serious illness or little consequence. The disease can affect any organ including skin, liver, lymph glands, spleen, eyes, nervous system, musculoskeletal system, heart, brain, kidneys, and including the lungs. Pulmonary sarcoidosis can cause loss of lung volume and abnormal lung stiffness. This disease is characterized by presence of granulomas, small areas of inflamed cells. Sarcoidosis patients should have regular cardiac and eye examinations as well as regular PFTs.\par \par problem 5: cardiac disease\par -continue to follow up with the cardiologist Dr. Foster\par \par Problem 6: Medication non compliance\par -advised him that he needs to be compliant on the recommended medication regimen to be able to see the effect.\par \par Pt to follow up with a call in about 1 week with update\par COVID19 swab for PFTs discussed\par stressed med compliance. \par Pt to go for follow up CT scan. \par

## 2020-06-29 NOTE — PHYSICAL EXAM
[Normal Oropharynx] : normal oropharynx [II] : Mallampati Class: II [No Acute Distress] : no acute distress [No Neck Mass] : no neck mass [Normal Rate/Rhythm] : normal rate/rhythm [Normal S1, S2] : normal s1, s2 [No Murmurs] : no murmurs [Clear to Auscultation Bilaterally] : clear to auscultation bilaterally [No Resp Distress] : no resp distress [No Abnormalities] : no abnormalities [No Cyanosis] : no cyanosis [Normal Gait] : normal gait [No Clubbing] : no clubbing [No Edema] : no edema [FROM] : FROM [Normal Color/ Pigmentation] : normal color/ pigmentation [Oriented x3] : oriented x3 [Normal Affect] : normal affect [Normal Appearance] : normal appearance [Well Nourished] : well nourished [Well Groomed] : well groomed [No Deformities] : no deformities [Supple] : supple [No JVD] : no jvd [Well Developed] : well developed [Normal Rhythm and Effort] : normal rhythm and effort [No Acc Muscle Use] : no acc muscle use [No Focal Deficits] : no focal deficits [TextBox_11] : BL nasal passages dry and with erythema

## 2020-06-29 NOTE — REVIEW OF SYSTEMS
[Nasal Congestion] : nasal congestion [Sinus Problems] : sinus problems [Headache] : headache [Fever] : no fever [Fatigue] : no fatigue [Chills] : no chills [Poor Appetite] : no poor appetite [Ear Disturbance] : no ear disturbance [Sore Throat] : no sore throat [Postnasal Drip] : no postnasal drip [Cough] : no cough [Dry Mouth] : dry mouth [Chest Tightness] : no chest tightness [Sputum] : no sputum [Dyspnea] : dyspnea [SOB on Exertion] : no sob on exertion [Wheezing] : no wheezing [Dizziness] : no dizziness [GERD] : gerd [Anxiety] : anxiety [Negative] : Constitutional [TextBox_134] : on ativan and valium

## 2020-06-29 NOTE — HISTORY OF PRESENT ILLNESS
[TextBox_4] : Mr. CROSS is a 65 year year old male with a history of cardiac disease, sarcoidosis, asthma, allergy, OSAS, s/p syncope (loop recorder) who presents for an acute pulmonary evaluation.\par \par He reports he has not been feeling well x 1 week. He went to go get covid19 tested last week and was negative. He has a sore throat x 3 days. That has resolved. He continues to deal with severe nasal dryness and sinus congestion/HA. He feels that he cannot breathe through his nose what so ever and it is worse with the mask. He feels his asthma has flared as well. He reports not being able to sleep at all due to feeling SOB. \par He reports some mild breathiness at rest. \par He feels a lot of this is driven by his sinuses. He has an ENT appointment today. \par He says he is still concerned about COVID19. \par He is concerned about sarcoidosis as well. \par He has not had a CT scan in years and has not had a recent CXR. \par He denies cough, fevers, chills, appetite loss, weakness.\par He has severe reflux despite being on pepcid 20 mg. \par He states that he is only on albuterol inhaler as needed and started within the last few days. \par He is using mometasone nasal spray and azelastine. He is not on an antihistamine pill due to feeling overly dry.\par He reports he does not ever really use his inhalers. He only has albuterol at home. \par He never received the recommended prednisone course from the last visit. \par He would like a follow up PFT as well. \par \par

## 2020-07-03 ENCOUNTER — APPOINTMENT (OUTPATIENT)
Dept: DISASTER EMERGENCY | Facility: CLINIC | Age: 65
End: 2020-07-03

## 2020-07-03 ENCOUNTER — LABORATORY RESULT (OUTPATIENT)
Age: 65
End: 2020-07-03

## 2020-07-06 ENCOUNTER — APPOINTMENT (OUTPATIENT)
Dept: ELECTROPHYSIOLOGY | Facility: CLINIC | Age: 65
End: 2020-07-06
Payer: MEDICARE

## 2020-07-06 ENCOUNTER — APPOINTMENT (OUTPATIENT)
Dept: PULMONOLOGY | Facility: CLINIC | Age: 65
End: 2020-07-06
Payer: MEDICARE

## 2020-07-06 PROCEDURE — 93298 REM INTERROG DEV EVAL SCRMS: CPT

## 2020-07-06 PROCEDURE — 94727 GAS DIL/WSHOT DETER LNG VOL: CPT

## 2020-07-06 PROCEDURE — 94729 DIFFUSING CAPACITY: CPT

## 2020-07-06 PROCEDURE — 94010 BREATHING CAPACITY TEST: CPT

## 2020-07-06 PROCEDURE — G2066: CPT

## 2020-07-09 ENCOUNTER — APPOINTMENT (OUTPATIENT)
Dept: PULMONOLOGY | Facility: CLINIC | Age: 65
End: 2020-07-09
Payer: MEDICARE

## 2020-07-09 DIAGNOSIS — B37.0 CANDIDAL STOMATITIS: ICD-10-CM

## 2020-07-09 PROCEDURE — 99214 OFFICE O/P EST MOD 30 MIN: CPT | Mod: 95

## 2020-07-11 NOTE — ASSESSMENT
[FreeTextEntry1] : The plan for the patient is as follows:\par \par problem 1: asthma (active)- improved overall \par -continue Symbicort 160 2 inhalations BID rinse and gargle\par -continue Spiriva 2 puffs QD rinse and gargle (sample given)\par -add aero chamber to reduce thrush risk\par -discuss inhaler hygiene in detail\par \par problem 2: oral thrush\par -add nystatin swish and swallow BID 5-7 days\par \par problem 3: allergic rhinitis/sinusitis hx with excessive nasal dryness, hx of nasal polyps with continued complaints- now overly congested.\par -follow up with ENT\par - Prednisone 20 mg x 7 days, 10 mg x 7 days\par Information sheet given to the patient to be reviewed, this medication is never to be used without consulting the prescribing physician. Proper dietary restraint is necessary specifically salt containing foods, if any reaction may occur should be reported. \par -Hold azelastine\par -off desloratadine\par -Continue nettipot use\par -add xlear or AYR saline gel\par -continue mometasone nasal spray 1 spray each nostril BID\par -Sinus CT next? discuss with ENT\par \par problem 4: GERD with excess reflux at night, waking up at times from it\par -Omeprazole 40 30 minutes before breakfast\par -pepcid to 40 mg after dinner. \par -next stop GI\par \par problem 5: sarcoidosis hx\par -?quiet at this point in time\par -continue yearly ophthalmological evaluations/PFTs needed\par -CXR was stable for patient\par -CT chest next- pt wants to hold off \par -Sarcoidosis is a medical mystery and can present with very serious illness or little consequence. The disease can affect any organ including skin, liver, lymph glands, spleen, eyes, nervous system, musculoskeletal system, heart, brain, kidneys, and including the lungs. Pulmonary sarcoidosis can cause loss of lung volume and abnormal lung stiffness. This disease is characterized by presence of granulomas, small areas of inflamed cells. Sarcoidosis patients should have regular cardiac and eye examinations as well as regular PFTs.\par \par problem 6: cardiac disease\par -continue to follow up with the cardiologist Dr. Liuhau\par \par Problem 7: Medication non compliance\par -advised him that he needs to be compliant on the recommended medication regimen to be able to see the effect.\par \par Pt to follow up with a call in about 1 week with update\par Pt to go for follow up CT scan/rediscuss in 1 week\par I spent 25 min with pt

## 2020-07-11 NOTE — HISTORY OF PRESENT ILLNESS
[Home] : at home, [unfilled] , at the time of the visit. [Medical Office: ([facility])___] : at the medical office located in  [Verbal consent obtained from patient] : the patient, [unfilled] [TextBox_4] : VISIT 6/29/2020: \par Mr. CROSS is a 65 year year old male with a history of cardiac disease, sarcoidosis, asthma, allergy, OSAS, s/p syncope (loop recorder) who presents for an acute pulmonary evaluation.\par \par He reports he has not been feeling well x 1 week. He went to go get covid19 tested last week and was negative. He has a sore throat x 3 days. That has resolved. He continues to deal with severe nasal dryness and sinus congestion/HA. He feels that he cannot breathe through his nose what so ever and it is worse with the mask. He feels his asthma has flared as well. He reports not being able to sleep at all due to feeling SOB. \par He reports some mild breathiness at rest. \par He feels a lot of this is driven by his sinuses. He has an ENT appointment today. \par He says he is still concerned about COVID19. \par He is concerned about sarcoidosis as well. \par He has not had a CT scan in years and has not had a recent CXR. \par He denies cough, fevers, chills, appetite loss, weakness.\par He has severe reflux despite being on pepcid 20 mg. \par He states that he is only on albuterol inhaler as needed and started within the last few days. \par He is using mometasone nasal spray and azelastine. He is not on an antihistamine pill due to feeling overly dry.\par He reports he does not ever really use his inhalers. He only has albuterol at home. \par He never received the recommended prednisone course from the last visit. \par He would like a follow up PFT as well. \par \par VISIT 7/9/2020:\par Pt is following up from previous visit with similar complaints. \par He would also like to go over his recent PFTs.\par \par Pt reports that he continues to deal with sinus symptoms- he feels overly congested and feels like his sinuses are still swollen.  He cannot breathe through his nose. \par He states that he did follow up with ENT but was told there was not much issue with sinuses. He was prescribed a medrol dose pack. \par He states that he has been using his inhalers prescribed and has gotten relief from them with his breathing. He recently stopped them due to feeling soreness and fullness in his throat. He notices that he definitely felt better on the inhalers. \par Most of the breathiness happens after being around his guinea pigs or clearing out their cages.\par He never took the prednisone or the medrol prescribed by his ENT.\par He reports that he continues to deal with reflux despite the famotidine. \par He wants to know if a CT scan is still necessary.\par \par No new sympmtoms. \par No cough, chest pain or pressure. No LE swelling. \par

## 2020-07-11 NOTE — PHYSICAL EXAM
[No Acute Distress] : no acute distress [Well Nourished] : well nourished [Well Groomed] : well groomed [No Deformities] : no deformities [Well Developed] : well developed [II] : Mallampati Class: II [Normal Appearance] : normal appearance [Supple] : supple [No Neck Mass] : no neck mass [No JVD] : no jvd [No Resp Distress] : no resp distress [No Acc Muscle Use] : no acc muscle use [Normal Rhythm and Effort] : normal rhythm and effort [Normal Gait] : normal gait [No Cyanosis] : no cyanosis [Normal Color/ Pigmentation] : normal color/ pigmentation [Normal Affect] : normal affect [Oriented x3] : oriented x3 [Normal Mood] : normal mood [TextBox_11] : tongue + for thrush

## 2020-07-23 ENCOUNTER — APPOINTMENT (OUTPATIENT)
Dept: OTOLARYNGOLOGY | Facility: CLINIC | Age: 65
End: 2020-07-23
Payer: MEDICARE

## 2020-07-23 VITALS
BODY MASS INDEX: 25.66 KG/M2 | WEIGHT: 154 LBS | DIASTOLIC BLOOD PRESSURE: 75 MMHG | HEART RATE: 67 BPM | TEMPERATURE: 98.2 F | HEIGHT: 65 IN | SYSTOLIC BLOOD PRESSURE: 126 MMHG

## 2020-07-23 PROCEDURE — 31231 NASAL ENDOSCOPY DX: CPT

## 2020-07-23 PROCEDURE — 99203 OFFICE O/P NEW LOW 30 MIN: CPT | Mod: 25

## 2020-07-24 NOTE — END OF VISIT
[FreeTextEntry3] : I personally saw and examined KEVIN CROSS in detail. I spoke to TARUN Cordero regarding the assessment and plan of care.  I preformed the procedures and I reviewed the above assessment and plan of care, and agree. I have made changes in changes in the body of the note where appropriate.\par \par

## 2020-07-24 NOTE — CONSULT LETTER
[Please see my note below.] : Please see my note below. [FreeTextEntry1] : Dear Dr. JAY LISKER \par I had the pleasure of evaluating your patient KEVIN CROSS, thank you for allowing us to participate in their care. please see full note detailing our visit below.\par If you have any questions, please do not hesitate to call me and I would be happy to discuss further. \par \par Adrian Cross M.D.\par Attending Physician,  \par Department of Otolaryngology - Head and Neck Surgery\par [facility] \par Office: [phone]\par Fax: [phone]\par \par

## 2020-07-24 NOTE — PROCEDURE
[FreeTextEntry6] : Procedure performed: Nasal Endoscopy- Diagnostic\par Pre-op indication(s): nasal congestion\par Post-op indication(s): nasal congestion \par Verbal and/or written consent obtained from patient\par Anterior rhinoscopy insufficient to account for symptoms\par Scope #: 3,  flexible fiber optic telescope \par The scope was introduced in the nasal passage between the middle and inferior turbinates to exam the inferior portion of the middle meatus and the fontanelle, as well as the maxillary ostia.  Next, the scope was passed medically and posteriorly to the middle turbinates to examine the sphenoethmoid recess and the superior turbinate region.\par Upon visualization the finders are as follows:\par Nasal Septum: sigmoidal septal deviation\par Bilateral - Mucosa: boggy turbinates, Mucous: +crusting, Polyp: ethmoid- minimal polypoid change and scarring, Inferior Turbinate: boggy, Middle Turbinate: normal, Superior Turbinate: normal, Inferior Meatus: narrow, Middle Meatus: narrow, Super Meatus:normal, Sphenoethmoidal Recess: clear\par maxillary sinus- wide open\par

## 2020-07-24 NOTE — ASSESSMENT
[FreeTextEntry1] : Pt with sinus pressure, and chronic nasal congestion. Will start regiment to see if may improve symptoms and escalate if needed. On exam clear sinuses but with polypoid changes, and scarring seen in the ethmoid sinus \par - started on ipratropium nasal spray and budesonide \par - Nasal irrigation and showed how to use it to maximize effectiveness \par - could get CT scan of the sinuses in the future if sxs still persists to assure no buried cells

## 2020-07-24 NOTE — HISTORY OF PRESENT ILLNESS
[de-identified] : pt with hx asthma, and polyp removal many years ago, and now pt c/o of nasal congestion, and feels sinus pressure in the maxillary and frotnal sinus that started 6 weeks to 2 months, also has h/a all around the back of his head, \par pt states the nasal congestion makes it very hard for him to breathe, and he feels like hes brething under water. \par Pt gets about 1 sinus infection a year, but this constant sinus pressure has been bothering him \par pt is using saline washes, flonase nasal spray, momentasone, and also has been on steroids and abx for the first time with minimal relief. \par is on baby asprin for heart- and doesn’t know if its affecting anything\par pt denies nasal d/c

## 2020-07-28 ENCOUNTER — APPOINTMENT (OUTPATIENT)
Dept: CARDIOLOGY | Facility: CLINIC | Age: 65
End: 2020-07-28
Payer: MEDICARE

## 2020-07-28 ENCOUNTER — NON-APPOINTMENT (OUTPATIENT)
Age: 65
End: 2020-07-28

## 2020-07-28 VITALS
TEMPERATURE: 97.5 F | OXYGEN SATURATION: 96 % | BODY MASS INDEX: 24.99 KG/M2 | DIASTOLIC BLOOD PRESSURE: 74 MMHG | HEART RATE: 58 BPM | SYSTOLIC BLOOD PRESSURE: 116 MMHG | HEIGHT: 65 IN | WEIGHT: 150 LBS

## 2020-07-28 PROCEDURE — 99214 OFFICE O/P EST MOD 30 MIN: CPT

## 2020-07-28 PROCEDURE — 93000 ELECTROCARDIOGRAM COMPLETE: CPT

## 2020-07-28 NOTE — HISTORY OF PRESENT ILLNESS
[FreeTextEntry1] : Doesn't go out much since its difficult to wear a mask.\par Has nasal issues.\par Taking Praluent as directed.  \par Stressed and not eating well.\par  \par \par \par

## 2020-07-28 NOTE — PHYSICAL EXAM
[General Appearance - Well Developed] : well developed [General Appearance - Well Nourished] : well nourished [No Oral Pallor] : no oral pallor [Normal Conjunctiva] : the conjunctiva exhibited no abnormalities [Normal Jugular Venous V Waves Present] : normal jugular venous V waves present [Respiration, Rhythm And Depth] : normal respiratory rhythm and effort [Auscultation Breath Sounds / Voice Sounds] : lungs were clear to auscultation bilaterally [Murmurs] : no murmurs present [Heart Sounds] : normal S1 and S2 [Abdomen Soft] : soft [Abnormal Walk] : normal gait [Cyanosis, Localized] : no localized cyanosis [Skin Turgor] : normal skin turgor [Oriented To Time, Place, And Person] : oriented to person, place, and time [Affect] : the affect was normal

## 2020-07-28 NOTE — DISCUSSION/SUMMARY
[FreeTextEntry1] : 65 year old with with coronary disease and Sarcoid with a history of vasovagal syncope. \par Loop recorder in place without reported arrhythmia.\par Encouraged exercise as possible.\par Tolerating CAD meds. Check labs.\par Will continue Praluent/asa/high dose statin.  \par ECG unchanged.

## 2020-08-02 LAB
ALBUMIN SERPL ELPH-MCNC: 4.5 G/DL
ALP BLD-CCNC: 53 U/L
ALT SERPL-CCNC: 25 U/L
ANION GAP SERPL CALC-SCNC: 12 MMOL/L
AST SERPL-CCNC: 20 U/L
BASOPHILS # BLD AUTO: 0.05 K/UL
BASOPHILS NFR BLD AUTO: 0.9 %
BILIRUB SERPL-MCNC: 0.5 MG/DL
BUN SERPL-MCNC: 21 MG/DL
CALCIUM SERPL-MCNC: 9.3 MG/DL
CHLORIDE SERPL-SCNC: 103 MMOL/L
CHOLEST SERPL-MCNC: 113 MG/DL
CHOLEST/HDLC SERPL: 1.6 RATIO
CO2 SERPL-SCNC: 25 MMOL/L
CREAT SERPL-MCNC: 0.91 MG/DL
EOSINOPHIL # BLD AUTO: 0.18 K/UL
EOSINOPHIL NFR BLD AUTO: 3.1 %
ESTIMATED AVERAGE GLUCOSE: 117 MG/DL
GLUCOSE SERPL-MCNC: 113 MG/DL
HBA1C MFR BLD HPLC: 5.7 %
HCT VFR BLD CALC: 50.1 %
HDLC SERPL-MCNC: 69 MG/DL
HGB BLD-MCNC: 16 G/DL
IMM GRANULOCYTES NFR BLD AUTO: 0.3 %
LDLC SERPL CALC-MCNC: 30 MG/DL
LYMPHOCYTES # BLD AUTO: 0.93 K/UL
LYMPHOCYTES NFR BLD AUTO: 16.2 %
MAN DIFF?: NORMAL
MCHC RBC-ENTMCNC: 31.4 PG
MCHC RBC-ENTMCNC: 31.9 GM/DL
MCV RBC AUTO: 98.2 FL
MONOCYTES # BLD AUTO: 0.41 K/UL
MONOCYTES NFR BLD AUTO: 7.2 %
NEUTROPHILS # BLD AUTO: 4.14 K/UL
NEUTROPHILS NFR BLD AUTO: 72.3 %
PLATELET # BLD AUTO: 178 K/UL
POTASSIUM SERPL-SCNC: 5.2 MMOL/L
PROT SERPL-MCNC: 6.2 G/DL
RBC # BLD: 5.1 M/UL
RBC # FLD: 13.2 %
SODIUM SERPL-SCNC: 141 MMOL/L
TRIGL SERPL-MCNC: 75 MG/DL
TSH SERPL-ACNC: 1.05 UIU/ML
WBC # FLD AUTO: 5.73 K/UL

## 2020-08-06 ENCOUNTER — RX RENEWAL (OUTPATIENT)
Age: 65
End: 2020-08-06

## 2020-08-10 ENCOUNTER — APPOINTMENT (OUTPATIENT)
Dept: ELECTROPHYSIOLOGY | Facility: CLINIC | Age: 65
End: 2020-08-10
Payer: MEDICARE

## 2020-08-10 ENCOUNTER — OUTPATIENT (OUTPATIENT)
Dept: OUTPATIENT SERVICES | Facility: HOSPITAL | Age: 65
LOS: 1 days | End: 2020-08-10
Payer: MEDICARE

## 2020-08-10 ENCOUNTER — APPOINTMENT (OUTPATIENT)
Dept: UROLOGY | Facility: CLINIC | Age: 65
End: 2020-08-10
Payer: MEDICARE

## 2020-08-10 VITALS — RESPIRATION RATE: 16 BRPM | DIASTOLIC BLOOD PRESSURE: 81 MMHG | SYSTOLIC BLOOD PRESSURE: 128 MMHG | HEART RATE: 55 BPM

## 2020-08-10 DIAGNOSIS — Z98.89 OTHER SPECIFIED POSTPROCEDURAL STATES: Chronic | ICD-10-CM

## 2020-08-10 DIAGNOSIS — R35.0 FREQUENCY OF MICTURITION: ICD-10-CM

## 2020-08-10 LAB
APPEARANCE: CLEAR
BACTERIA: NEGATIVE
BILIRUBIN URINE: NEGATIVE
BLOOD URINE: NEGATIVE
COLOR: NORMAL
GLUCOSE QUALITATIVE U: NEGATIVE
HYALINE CASTS: 0 /LPF
KETONES URINE: NEGATIVE
LEUKOCYTE ESTERASE URINE: NEGATIVE
MICROSCOPIC-UA: NORMAL
NITRITE URINE: NEGATIVE
PH URINE: 6
PROTEIN URINE: NEGATIVE
RED BLOOD CELLS URINE: 0 /HPF
SPECIFIC GRAVITY URINE: 1.01
SQUAMOUS EPITHELIAL CELLS: 0 /HPF
UROBILINOGEN URINE: NORMAL
WHITE BLOOD CELLS URINE: 0 /HPF

## 2020-08-10 PROCEDURE — 93298 REM INTERROG DEV EVAL SCRMS: CPT

## 2020-08-10 PROCEDURE — G2066: CPT

## 2020-08-10 PROCEDURE — 99213 OFFICE O/P EST LOW 20 MIN: CPT | Mod: 25

## 2020-08-10 PROCEDURE — 51700 IRRIGATION OF BLADDER: CPT

## 2020-08-12 LAB — BACTERIA UR CULT: NORMAL

## 2020-08-14 DIAGNOSIS — R35.1 NOCTURIA: ICD-10-CM

## 2020-08-14 DIAGNOSIS — R39.15 URGENCY OF URINATION: ICD-10-CM

## 2020-08-18 ENCOUNTER — APPOINTMENT (OUTPATIENT)
Dept: OTOLARYNGOLOGY | Facility: CLINIC | Age: 65
End: 2020-08-18
Payer: MEDICARE

## 2020-08-18 VITALS
BODY MASS INDEX: 24.99 KG/M2 | DIASTOLIC BLOOD PRESSURE: 77 MMHG | SYSTOLIC BLOOD PRESSURE: 122 MMHG | HEIGHT: 65 IN | HEART RATE: 68 BPM | WEIGHT: 150 LBS | TEMPERATURE: 97.8 F

## 2020-08-18 DIAGNOSIS — J34.3 HYPERTROPHY OF NASAL TURBINATES: ICD-10-CM

## 2020-08-18 PROCEDURE — 99213 OFFICE O/P EST LOW 20 MIN: CPT | Mod: 25

## 2020-08-18 PROCEDURE — 31231 NASAL ENDOSCOPY DX: CPT

## 2020-08-18 RX ORDER — LORAZEPAM 0.5 MG/1
0.5 TABLET ORAL
Qty: 7 | Refills: 0 | Status: ACTIVE | COMMUNITY
Start: 2020-08-14

## 2020-08-18 NOTE — HISTORY OF PRESENT ILLNESS
[de-identified] : pt with hx asthma, and polyp removal many years ago, and now pt c/o of nasal congestion, and feels sinus pressure in the maxillary and frotnal sinus that started 6 weeks to 2 months, also has h/a all around the back of his head, \par pt states the nasal congestion makes it very hard for him to breathe, and he feels like hes brething under water. \par Pt gets about 1 sinus infection a year, but this constant sinus pressure has been bothering him \par pt is using saline washes, flonase nasal spray, azelastine, momentasone, and also has been on steroids and abx for the first time with minimal relief. \par is on baby asprin for heart- and doesn’t know if its affecting anything\par pt denies nasal d/c\par tried budesonide rinses but did not tolerate well, felt gave him some blisters\par

## 2020-08-18 NOTE — CONSULT LETTER
[FreeTextEntry1] : Dear Dr. JAY LISKER \par I had the pleasure of evaluating your patient KEVIN CROSS, thank you for allowing us to participate in their care. please see full note detailing our visit below.\par If you have any questions, please do not hesitate to call me and I would be happy to discuss further. \par \par Adrian Cross M.D.\par Attending Physician,  \par Department of Otolaryngology - Head and Neck Surgery\par [facility] \par Office: [phone]\par Fax: [phone]\par \par  [Please see my note below.] : Please see my note below.

## 2020-08-18 NOTE — ASSESSMENT
[FreeTextEntry1] : Pt with sinus pressure, and chronic nasal congestion with BITH. Will start regiment to see if may improve symptoms and escalate if needed. On exam clear sinuses , and scarring seen in the ethmoid sinus \par - started on hypertonic saline and budesonide \par - Nasal irrigation and showed how to use it to maximize effectiveness \par - CT scan of the sinuses in the future if sxs still persists to assure no buried cells, if does not have any sinus disease it s unlikely to be able to help with his HA - he will call to see if would like to get as not sure if general HA \par can consider office turb reduction \par \par

## 2020-08-19 ENCOUNTER — APPOINTMENT (OUTPATIENT)
Dept: UROLOGY | Facility: CLINIC | Age: 65
End: 2020-08-19

## 2020-08-21 ENCOUNTER — APPOINTMENT (OUTPATIENT)
Dept: UROLOGY | Facility: CLINIC | Age: 65
End: 2020-08-21
Payer: MEDICARE

## 2020-08-21 ENCOUNTER — OUTPATIENT (OUTPATIENT)
Dept: OUTPATIENT SERVICES | Facility: HOSPITAL | Age: 65
LOS: 1 days | End: 2020-08-21
Payer: MEDICARE

## 2020-08-21 VITALS — DIASTOLIC BLOOD PRESSURE: 80 MMHG | HEART RATE: 48 BPM | SYSTOLIC BLOOD PRESSURE: 128 MMHG | RESPIRATION RATE: 14 BRPM

## 2020-08-21 VITALS — SYSTOLIC BLOOD PRESSURE: 119 MMHG | DIASTOLIC BLOOD PRESSURE: 79 MMHG | HEART RATE: 49 BPM | RESPIRATION RATE: 14 BRPM

## 2020-08-21 VITALS — TEMPERATURE: 98.6 F

## 2020-08-21 DIAGNOSIS — Z98.89 OTHER SPECIFIED POSTPROCEDURAL STATES: Chronic | ICD-10-CM

## 2020-08-21 DIAGNOSIS — R35.0 FREQUENCY OF MICTURITION: ICD-10-CM

## 2020-08-21 PROCEDURE — 51700 IRRIGATION OF BLADDER: CPT

## 2020-08-21 PROCEDURE — 99213 OFFICE O/P EST LOW 20 MIN: CPT | Mod: 25

## 2020-08-26 DIAGNOSIS — R35.1 NOCTURIA: ICD-10-CM

## 2020-08-26 DIAGNOSIS — M79.10 MYALGIA, UNSPECIFIED SITE: ICD-10-CM

## 2020-08-27 ENCOUNTER — APPOINTMENT (OUTPATIENT)
Dept: ELECTROPHYSIOLOGY | Facility: CLINIC | Age: 65
End: 2020-08-27

## 2020-08-28 ENCOUNTER — OUTPATIENT (OUTPATIENT)
Dept: OUTPATIENT SERVICES | Facility: HOSPITAL | Age: 65
LOS: 1 days | End: 2020-08-28
Payer: MEDICARE

## 2020-08-28 ENCOUNTER — APPOINTMENT (OUTPATIENT)
Dept: UROLOGY | Facility: CLINIC | Age: 65
End: 2020-08-28
Payer: MEDICARE

## 2020-08-28 VITALS — RESPIRATION RATE: 15 BRPM | DIASTOLIC BLOOD PRESSURE: 84 MMHG | HEART RATE: 53 BPM | SYSTOLIC BLOOD PRESSURE: 138 MMHG

## 2020-08-28 VITALS — TEMPERATURE: 98.4 F

## 2020-08-28 DIAGNOSIS — Z98.89 OTHER SPECIFIED POSTPROCEDURAL STATES: Chronic | ICD-10-CM

## 2020-08-28 DIAGNOSIS — R35.0 FREQUENCY OF MICTURITION: ICD-10-CM

## 2020-08-28 PROCEDURE — 51700 IRRIGATION OF BLADDER: CPT

## 2020-09-02 ENCOUNTER — OUTPATIENT (OUTPATIENT)
Dept: OUTPATIENT SERVICES | Facility: HOSPITAL | Age: 65
LOS: 1 days | End: 2020-09-02
Payer: MEDICARE

## 2020-09-02 ENCOUNTER — APPOINTMENT (OUTPATIENT)
Dept: UROLOGY | Facility: CLINIC | Age: 65
End: 2020-09-02
Payer: MEDICARE

## 2020-09-02 VITALS — TEMPERATURE: 97.9 F

## 2020-09-02 VITALS — SYSTOLIC BLOOD PRESSURE: 134 MMHG | DIASTOLIC BLOOD PRESSURE: 79 MMHG | HEART RATE: 48 BPM

## 2020-09-02 DIAGNOSIS — N40.1 BENIGN PROSTATIC HYPERPLASIA WITH LOWER URINARY TRACT SYMPTOMS: ICD-10-CM

## 2020-09-02 DIAGNOSIS — Z98.89 OTHER SPECIFIED POSTPROCEDURAL STATES: Chronic | ICD-10-CM

## 2020-09-02 DIAGNOSIS — R35.0 FREQUENCY OF MICTURITION: ICD-10-CM

## 2020-09-02 PROCEDURE — 51700 IRRIGATION OF BLADDER: CPT

## 2020-09-02 PROCEDURE — 99213 OFFICE O/P EST LOW 20 MIN: CPT | Mod: 25

## 2020-09-03 LAB
PSA FREE FLD-MCNC: 20 %
PSA FREE SERPL-MCNC: 0.16 NG/ML
PSA SERPL-MCNC: 0.79 NG/ML

## 2020-09-11 ENCOUNTER — OUTPATIENT (OUTPATIENT)
Dept: OUTPATIENT SERVICES | Facility: HOSPITAL | Age: 65
LOS: 1 days | End: 2020-09-11
Payer: MEDICARE

## 2020-09-11 ENCOUNTER — APPOINTMENT (OUTPATIENT)
Dept: UROLOGY | Facility: CLINIC | Age: 65
End: 2020-09-11
Payer: MEDICARE

## 2020-09-11 VITALS — SYSTOLIC BLOOD PRESSURE: 147 MMHG | RESPIRATION RATE: 16 BRPM | DIASTOLIC BLOOD PRESSURE: 81 MMHG | HEART RATE: 80 BPM

## 2020-09-11 VITALS — HEART RATE: 80 BPM | DIASTOLIC BLOOD PRESSURE: 79 MMHG | SYSTOLIC BLOOD PRESSURE: 124 MMHG | RESPIRATION RATE: 16 BRPM

## 2020-09-11 DIAGNOSIS — Z98.89 OTHER SPECIFIED POSTPROCEDURAL STATES: Chronic | ICD-10-CM

## 2020-09-11 DIAGNOSIS — R35.0 FREQUENCY OF MICTURITION: ICD-10-CM

## 2020-09-11 PROCEDURE — 51700 IRRIGATION OF BLADDER: CPT

## 2020-09-11 RX ORDER — OXYCODONE AND ACETAMINOPHEN 5; 325 MG/1; MG/1
5-325 TABLET ORAL
Qty: 60 | Refills: 0 | Status: DISCONTINUED | COMMUNITY
Start: 2020-06-10 | End: 2020-09-11

## 2020-09-11 RX ORDER — FLUNISOLIDE 0.25 MG/ML
25 MCG/ACT SOLUTION NASAL TWICE DAILY
Qty: 3 | Refills: 2 | Status: DISCONTINUED | COMMUNITY
Start: 2017-02-03 | End: 2020-09-11

## 2020-09-11 RX ORDER — PREDNISONE 10 MG/1
10 TABLET ORAL
Qty: 45 | Refills: 0 | Status: DISCONTINUED | COMMUNITY
Start: 2020-06-29 | End: 2020-09-11

## 2020-09-11 RX ORDER — CLOBETASOL PROPIONATE 0.5 MG/G
0.05 CREAM TOPICAL
Qty: 1 | Refills: 1 | Status: DISCONTINUED | COMMUNITY
Start: 2018-11-07 | End: 2020-09-11

## 2020-09-11 RX ORDER — METHYLPREDNISOLONE 4 MG/1
4 TABLET ORAL
Qty: 21 | Refills: 0 | Status: DISCONTINUED | COMMUNITY
Start: 2020-06-10 | End: 2020-09-11

## 2020-09-11 RX ORDER — LEVOCETIRIZINE DIHYDROCHLORIDE 5 MG/1
5 TABLET ORAL
Qty: 1 | Refills: 1 | Status: DISCONTINUED | COMMUNITY
Start: 2020-05-01 | End: 2020-09-11

## 2020-09-11 RX ORDER — SERTRALINE HYDROCHLORIDE 50 MG/1
50 TABLET, FILM COATED ORAL DAILY
Qty: 30 | Refills: 3 | Status: DISCONTINUED | COMMUNITY
Start: 2020-05-14 | End: 2020-09-11

## 2020-09-11 RX ORDER — RANITIDINE HYDROCHLORIDE 300 MG/1
300 CAPSULE ORAL
Qty: 1 | Refills: 1 | Status: DISCONTINUED | COMMUNITY
Start: 2018-01-12 | End: 2020-09-11

## 2020-09-11 RX ORDER — GABAPENTIN 600 MG/1
600 TABLET, COATED ORAL
Qty: 90 | Refills: 0 | Status: DISCONTINUED | COMMUNITY
Start: 2020-03-11 | End: 2020-09-11

## 2020-09-11 RX ORDER — AZELASTINE HYDROCHLORIDE 137 UG/1
0.1 SPRAY, METERED NASAL TWICE DAILY
Qty: 1 | Refills: 1 | Status: DISCONTINUED | COMMUNITY
Start: 2020-03-02 | End: 2020-09-11

## 2020-09-11 RX ORDER — ACYCLOVIR 400 MG/1
400 TABLET ORAL 3 TIMES DAILY
Qty: 21 | Refills: 0 | Status: DISCONTINUED | COMMUNITY
Start: 2019-05-21 | End: 2020-09-11

## 2020-09-11 RX ORDER — LEVOCETIRIZINE DIHYDROCHLORIDE 5 MG/1
5 TABLET ORAL
Qty: 1 | Refills: 1 | Status: DISCONTINUED | COMMUNITY
Start: 2020-04-06 | End: 2020-09-11

## 2020-09-11 RX ORDER — BUDESONIDE 0.5 MG/2ML
0.5 INHALANT ORAL
Qty: 1 | Refills: 5 | Status: DISCONTINUED | COMMUNITY
Start: 2020-07-23 | End: 2020-09-11

## 2020-09-11 RX ORDER — IPRATROPIUM BROMIDE 21 UG/1
0.03 SPRAY NASAL 3 TIMES DAILY
Qty: 1 | Refills: 0 | Status: DISCONTINUED | COMMUNITY
Start: 2020-07-23 | End: 2020-09-11

## 2020-09-11 RX ORDER — BUDESONIDE AND FORMOTEROL FUMARATE DIHYDRATE 160; 4.5 UG/1; UG/1
160-4.5 AEROSOL RESPIRATORY (INHALATION) TWICE DAILY
Qty: 1 | Refills: 0 | Status: DISCONTINUED | COMMUNITY
Start: 2020-04-03 | End: 2020-09-11

## 2020-09-11 RX ORDER — SILODOSIN 8 MG/1
8 CAPSULE ORAL
Qty: 30 | Refills: 2 | Status: DISCONTINUED | COMMUNITY
Start: 2020-08-10 | End: 2020-09-11

## 2020-09-11 RX ORDER — OLOPATADINE HYDROCHLORIDE 665 UG/1
0.6 SPRAY, METERED NASAL
Qty: 3 | Refills: 1 | Status: DISCONTINUED | COMMUNITY
Start: 2017-02-03 | End: 2020-09-11

## 2020-09-11 RX ORDER — NYSTATIN 100000 [USP'U]/ML
100000 SUSPENSION ORAL
Qty: 360 | Refills: 0 | Status: DISCONTINUED | COMMUNITY
Start: 2020-07-09 | End: 2020-09-11

## 2020-09-11 RX ORDER — AMOXICILLIN AND CLAVULANATE POTASSIUM 875; 125 MG/1; MG/1
875-125 TABLET, COATED ORAL
Qty: 20 | Refills: 0 | Status: DISCONTINUED | COMMUNITY
Start: 2020-02-28 | End: 2020-09-11

## 2020-09-11 RX ORDER — OMEPRAZOLE 40 MG/1
40 CAPSULE, DELAYED RELEASE ORAL
Qty: 1 | Refills: 1 | Status: DISCONTINUED | COMMUNITY
Start: 2020-07-09 | End: 2020-09-11

## 2020-09-11 RX ORDER — MOMETASONE 50 UG/1
50 SPRAY, METERED NASAL TWICE DAILY
Qty: 3 | Refills: 1 | Status: DISCONTINUED | COMMUNITY
Start: 2020-05-01 | End: 2020-09-11

## 2020-09-11 RX ORDER — PROMETHAZINE HYDROCHLORIDE AND DEXTROMETHORPHAN HYDROBROMIDE ORAL SOLUTION 15; 6.25 MG/5ML; MG/5ML
6.25-15 SOLUTION ORAL EVERY 6 HOURS
Qty: 280 | Refills: 0 | Status: DISCONTINUED | COMMUNITY
Start: 2020-04-29 | End: 2020-09-11

## 2020-09-11 RX ORDER — TRAZODONE HYDROCHLORIDE 50 MG/1
50 TABLET ORAL
Qty: 30 | Refills: 0 | Status: DISCONTINUED | COMMUNITY
Start: 2020-07-17 | End: 2020-09-11

## 2020-09-11 RX ORDER — LORAZEPAM 0.5 MG/1
0.5 TABLET ORAL
Qty: 7 | Refills: 0 | Status: DISCONTINUED | COMMUNITY
Start: 2020-08-14 | End: 2020-09-11

## 2020-09-11 RX ORDER — BUDESONIDE AND FORMOTEROL FUMARATE DIHYDRATE 160; 4.5 UG/1; UG/1
160-4.5 AEROSOL RESPIRATORY (INHALATION) TWICE DAILY
Qty: 1 | Refills: 3 | Status: DISCONTINUED | COMMUNITY
Start: 2020-06-29 | End: 2020-09-11

## 2020-09-11 RX ORDER — MONTELUKAST 10 MG/1
10 TABLET, FILM COATED ORAL
Qty: 1 | Refills: 1 | Status: DISCONTINUED | COMMUNITY
Start: 2020-04-06 | End: 2020-09-11

## 2020-09-14 ENCOUNTER — OUTPATIENT (OUTPATIENT)
Dept: OUTPATIENT SERVICES | Facility: HOSPITAL | Age: 65
LOS: 1 days | End: 2020-09-14
Payer: MEDICARE

## 2020-09-14 ENCOUNTER — APPOINTMENT (OUTPATIENT)
Dept: ELECTROPHYSIOLOGY | Facility: CLINIC | Age: 65
End: 2020-09-14

## 2020-09-14 ENCOUNTER — APPOINTMENT (OUTPATIENT)
Dept: CT IMAGING | Facility: CLINIC | Age: 65
End: 2020-09-14
Payer: MEDICARE

## 2020-09-14 ENCOUNTER — RESULT REVIEW (OUTPATIENT)
Age: 65
End: 2020-09-14

## 2020-09-14 ENCOUNTER — APPOINTMENT (OUTPATIENT)
Dept: OTOLARYNGOLOGY | Facility: CLINIC | Age: 65
End: 2020-09-14
Payer: MEDICARE

## 2020-09-14 VITALS
HEIGHT: 65 IN | HEART RATE: 57 BPM | SYSTOLIC BLOOD PRESSURE: 129 MMHG | TEMPERATURE: 98.2 F | WEIGHT: 150 LBS | DIASTOLIC BLOOD PRESSURE: 83 MMHG | BODY MASS INDEX: 24.99 KG/M2

## 2020-09-14 DIAGNOSIS — Z00.8 ENCOUNTER FOR OTHER GENERAL EXAMINATION: ICD-10-CM

## 2020-09-14 DIAGNOSIS — Z98.89 OTHER SPECIFIED POSTPROCEDURAL STATES: Chronic | ICD-10-CM

## 2020-09-14 PROCEDURE — 31231 NASAL ENDOSCOPY DX: CPT

## 2020-09-14 PROCEDURE — 99214 OFFICE O/P EST MOD 30 MIN: CPT | Mod: 25

## 2020-09-14 PROCEDURE — 70486 CT MAXILLOFACIAL W/O DYE: CPT | Mod: 26

## 2020-09-14 PROCEDURE — 70486 CT MAXILLOFACIAL W/O DYE: CPT

## 2020-09-14 RX ORDER — AZELASTINE HYDROCHLORIDE AND FLUTICASONE PROPIONATE 137; 50 UG/1; UG/1
137-50 SPRAY, METERED NASAL
Qty: 23 | Refills: 0 | Status: ACTIVE | COMMUNITY
Start: 2020-09-14 | End: 1900-01-01

## 2020-09-14 NOTE — ASSESSMENT
[FreeTextEntry1] : Since her symptoms have persisted we will try Dymista even though it may not be covered I do recommend he get allergy tested in his next visit we sent her for a CAT scan to definitively evaluate his paranasal sinuses endoscopically he does have a deviated septum but no evidence of any obvious polyps we will await the CAT scan to determine what the next step should be.

## 2020-09-14 NOTE — HISTORY OF PRESENT ILLNESS
[de-identified] : Patient had moderate nasal congestion for two days last week and then it subsided on its own. However, this returned a few days later and he continues to have nasal congestion today that is more severe. He has been sneezing a lot and feels like he cannot breathe through the nose. He has never been tested for allergies, but feels like he cannot take allergy medications as a result of prostate issues, but tried it anyway over a week ago. He has had surgery in the past for nasal polyps. He cannot tolerate nasal sprays as they dry his nose, and saline does not help him.

## 2020-09-16 DIAGNOSIS — R35.1 NOCTURIA: ICD-10-CM

## 2020-09-16 DIAGNOSIS — N40.1 BENIGN PROSTATIC HYPERPLASIA WITH LOWER URINARY TRACT SYMPTOMS: ICD-10-CM

## 2020-09-16 DIAGNOSIS — R39.15 URGENCY OF URINATION: ICD-10-CM

## 2020-09-17 ENCOUNTER — RECORD ABSTRACTING (OUTPATIENT)
Age: 65
End: 2020-09-17

## 2020-09-17 ENCOUNTER — OUTPATIENT (OUTPATIENT)
Dept: OUTPATIENT SERVICES | Facility: HOSPITAL | Age: 65
LOS: 1 days | End: 2020-09-17
Payer: MEDICARE

## 2020-09-17 ENCOUNTER — APPOINTMENT (OUTPATIENT)
Dept: ELECTROPHYSIOLOGY | Facility: CLINIC | Age: 65
End: 2020-09-17
Payer: MEDICARE

## 2020-09-17 ENCOUNTER — NON-APPOINTMENT (OUTPATIENT)
Age: 65
End: 2020-09-17

## 2020-09-17 ENCOUNTER — APPOINTMENT (OUTPATIENT)
Dept: UROLOGY | Facility: CLINIC | Age: 65
End: 2020-09-17
Payer: MEDICARE

## 2020-09-17 VITALS — DIASTOLIC BLOOD PRESSURE: 78 MMHG | SYSTOLIC BLOOD PRESSURE: 132 MMHG | HEART RATE: 74 BPM

## 2020-09-17 VITALS — HEART RATE: 67 BPM | DIASTOLIC BLOOD PRESSURE: 84 MMHG | SYSTOLIC BLOOD PRESSURE: 128 MMHG | RESPIRATION RATE: 16 BRPM

## 2020-09-17 VITALS — TEMPERATURE: 98.3 F

## 2020-09-17 DIAGNOSIS — R35.0 FREQUENCY OF MICTURITION: ICD-10-CM

## 2020-09-17 DIAGNOSIS — M79.10 MYALGIA, UNSPECIFIED SITE: ICD-10-CM

## 2020-09-17 DIAGNOSIS — R35.1 NOCTURIA: ICD-10-CM

## 2020-09-17 DIAGNOSIS — Z98.89 OTHER SPECIFIED POSTPROCEDURAL STATES: Chronic | ICD-10-CM

## 2020-09-17 PROCEDURE — 93298 REM INTERROG DEV EVAL SCRMS: CPT

## 2020-09-17 PROCEDURE — A4216: CPT | Mod: NC

## 2020-09-17 PROCEDURE — 51700 IRRIGATION OF BLADDER: CPT

## 2020-09-17 PROCEDURE — G2066: CPT

## 2020-09-23 ENCOUNTER — APPOINTMENT (OUTPATIENT)
Dept: UROLOGY | Facility: CLINIC | Age: 65
End: 2020-09-23
Payer: MEDICARE

## 2020-09-23 ENCOUNTER — OUTPATIENT (OUTPATIENT)
Dept: OUTPATIENT SERVICES | Facility: HOSPITAL | Age: 65
LOS: 1 days | End: 2020-09-23
Payer: MEDICARE

## 2020-09-23 VITALS — DIASTOLIC BLOOD PRESSURE: 78 MMHG | SYSTOLIC BLOOD PRESSURE: 147 MMHG | HEART RATE: 77 BPM

## 2020-09-23 VITALS — TEMPERATURE: 98.2 F

## 2020-09-23 VITALS — RESPIRATION RATE: 16 BRPM | HEART RATE: 73 BPM | SYSTOLIC BLOOD PRESSURE: 150 MMHG | DIASTOLIC BLOOD PRESSURE: 79 MMHG

## 2020-09-23 DIAGNOSIS — N40.1 BENIGN PROSTATIC HYPERPLASIA WITH LOWER URINARY TRACT SYMPMS: ICD-10-CM

## 2020-09-23 DIAGNOSIS — N13.8 BENIGN PROSTATIC HYPERPLASIA WITH LOWER URINARY TRACT SYMPMS: ICD-10-CM

## 2020-09-23 DIAGNOSIS — Z98.89 OTHER SPECIFIED POSTPROCEDURAL STATES: Chronic | ICD-10-CM

## 2020-09-23 DIAGNOSIS — R35.0 FREQUENCY OF MICTURITION: ICD-10-CM

## 2020-09-23 PROCEDURE — 51700 IRRIGATION OF BLADDER: CPT

## 2020-09-24 PROBLEM — N40.1 BENIGN LOCALIZED HYPERPLASIA OF PROSTATE WITH URINARY OBSTRUCTION: Status: ACTIVE | Noted: 2018-02-12

## 2020-09-25 ENCOUNTER — NON-APPOINTMENT (OUTPATIENT)
Age: 65
End: 2020-09-25

## 2020-09-28 DIAGNOSIS — N40.1 BENIGN PROSTATIC HYPERPLASIA WITH LOWER URINARY TRACT SYMPTOMS: ICD-10-CM

## 2020-09-29 ENCOUNTER — APPOINTMENT (OUTPATIENT)
Dept: OTOLARYNGOLOGY | Facility: CLINIC | Age: 65
End: 2020-09-29
Payer: MEDICARE

## 2020-09-29 VITALS
TEMPERATURE: 97.7 F | SYSTOLIC BLOOD PRESSURE: 139 MMHG | WEIGHT: 150 LBS | HEIGHT: 65 IN | DIASTOLIC BLOOD PRESSURE: 85 MMHG | BODY MASS INDEX: 24.99 KG/M2 | HEART RATE: 53 BPM

## 2020-09-29 DIAGNOSIS — R09.81 NASAL CONGESTION: ICD-10-CM

## 2020-09-29 DIAGNOSIS — Z87.09 PERSONAL HISTORY OF OTHER DISEASES OF THE RESPIRATORY SYSTEM: ICD-10-CM

## 2020-09-29 DIAGNOSIS — J34.2 DEVIATED NASAL SEPTUM: ICD-10-CM

## 2020-09-29 PROCEDURE — 31231 NASAL ENDOSCOPY DX: CPT

## 2020-09-29 PROCEDURE — 99214 OFFICE O/P EST MOD 30 MIN: CPT | Mod: 25

## 2020-09-29 NOTE — ASSESSMENT
[FreeTextEntry1] : Follows up CAT scan reviewed with him seems to have chronic ethmoid disease that has persisted he is most likely candidate for sinus surgery obviously pending approval and clearance from his cardiologist but doubt her predominantly he is on antibiotics once again will continue to monitor him and see him in month or so until he decides if he wants to proceed with a revision surgery we discussed the pros and cons of balloon and I do not think it is going to really help him obviously he will need pulmonary and cardiac clearance.

## 2020-09-29 NOTE — HISTORY OF PRESENT ILLNESS
[de-identified] : Patient did the antibitoics and the steroids, as well as the steroid spray after the last visit. He admits to feeling minor improvement but continues to have bilateral nasal congestion. he is not having any sinus pressure right now and is not having any issues with nosebleeds. He does admit that he feels he is having a flare up right now with minor pressure that is intermittent.

## 2020-09-29 NOTE — REVIEW OF SYSTEMS
[Nasal Congestion] : nasal congestion [Recurrent Sinus Infections] : recurrent sinus infections [Sinus Pain] : sinus pain [Sinus Pressure] : sinus pressure [Negative] : Heme/Lymph

## 2020-10-02 ENCOUNTER — APPOINTMENT (OUTPATIENT)
Dept: UROLOGY | Facility: CLINIC | Age: 65
End: 2020-10-02
Payer: MEDICARE

## 2020-10-02 ENCOUNTER — OUTPATIENT (OUTPATIENT)
Dept: OUTPATIENT SERVICES | Facility: HOSPITAL | Age: 65
LOS: 1 days | End: 2020-10-02
Payer: MEDICARE

## 2020-10-02 VITALS — SYSTOLIC BLOOD PRESSURE: 135 MMHG | DIASTOLIC BLOOD PRESSURE: 73 MMHG | RESPIRATION RATE: 16 BRPM | HEART RATE: 59 BPM

## 2020-10-02 VITALS — TEMPERATURE: 98.2 F

## 2020-10-02 DIAGNOSIS — Z98.89 OTHER SPECIFIED POSTPROCEDURAL STATES: Chronic | ICD-10-CM

## 2020-10-02 DIAGNOSIS — R35.0 FREQUENCY OF MICTURITION: ICD-10-CM

## 2020-10-02 PROCEDURE — 51700 IRRIGATION OF BLADDER: CPT

## 2020-10-05 ENCOUNTER — APPOINTMENT (OUTPATIENT)
Dept: UROLOGY | Facility: CLINIC | Age: 65
End: 2020-10-05

## 2020-10-09 DIAGNOSIS — R35.1 NOCTURIA: ICD-10-CM

## 2020-10-15 ENCOUNTER — OUTPATIENT (OUTPATIENT)
Dept: OUTPATIENT SERVICES | Facility: HOSPITAL | Age: 65
LOS: 1 days | End: 2020-10-15
Payer: MEDICARE

## 2020-10-15 ENCOUNTER — APPOINTMENT (OUTPATIENT)
Dept: UROLOGY | Facility: CLINIC | Age: 65
End: 2020-10-15
Payer: MEDICARE

## 2020-10-15 VITALS — DIASTOLIC BLOOD PRESSURE: 79 MMHG | HEART RATE: 50 BPM | RESPIRATION RATE: 16 BRPM | SYSTOLIC BLOOD PRESSURE: 129 MMHG

## 2020-10-15 VITALS — HEART RATE: 56 BPM | RESPIRATION RATE: 16 BRPM | DIASTOLIC BLOOD PRESSURE: 82 MMHG | SYSTOLIC BLOOD PRESSURE: 129 MMHG

## 2020-10-15 VITALS — TEMPERATURE: 97.9 F

## 2020-10-15 DIAGNOSIS — A60.01 HERPESVIRAL INFECTION OF PENIS: ICD-10-CM

## 2020-10-15 DIAGNOSIS — Z98.89 OTHER SPECIFIED POSTPROCEDURAL STATES: Chronic | ICD-10-CM

## 2020-10-15 DIAGNOSIS — R35.0 FREQUENCY OF MICTURITION: ICD-10-CM

## 2020-10-15 PROCEDURE — 99213 OFFICE O/P EST LOW 20 MIN: CPT | Mod: 25

## 2020-10-15 PROCEDURE — 51700 IRRIGATION OF BLADDER: CPT

## 2020-10-15 RX ORDER — ACYCLOVIR 50 MG/G
5 CREAM TOPICAL
Qty: 1 | Refills: 1 | Status: DISCONTINUED | COMMUNITY
Start: 2020-10-15 | End: 2020-10-15

## 2020-10-20 ENCOUNTER — APPOINTMENT (OUTPATIENT)
Dept: UROLOGY | Facility: CLINIC | Age: 65
End: 2020-10-20

## 2020-10-22 ENCOUNTER — NON-APPOINTMENT (OUTPATIENT)
Age: 65
End: 2020-10-22

## 2020-10-22 ENCOUNTER — OUTPATIENT (OUTPATIENT)
Dept: OUTPATIENT SERVICES | Facility: HOSPITAL | Age: 65
LOS: 1 days | End: 2020-10-22
Payer: MEDICARE

## 2020-10-22 ENCOUNTER — APPOINTMENT (OUTPATIENT)
Dept: UROLOGY | Facility: CLINIC | Age: 65
End: 2020-10-22
Payer: MEDICARE

## 2020-10-22 ENCOUNTER — APPOINTMENT (OUTPATIENT)
Dept: ELECTROPHYSIOLOGY | Facility: CLINIC | Age: 65
End: 2020-10-22
Payer: MEDICARE

## 2020-10-22 VITALS — DIASTOLIC BLOOD PRESSURE: 83 MMHG | HEART RATE: 54 BPM | SYSTOLIC BLOOD PRESSURE: 128 MMHG

## 2020-10-22 VITALS — TEMPERATURE: 97.2 F

## 2020-10-22 DIAGNOSIS — R35.0 FREQUENCY OF MICTURITION: ICD-10-CM

## 2020-10-22 DIAGNOSIS — M79.10 MYALGIA, UNSPECIFIED SITE: ICD-10-CM

## 2020-10-22 DIAGNOSIS — R35.1 NOCTURIA: ICD-10-CM

## 2020-10-22 DIAGNOSIS — Z98.89 OTHER SPECIFIED POSTPROCEDURAL STATES: Chronic | ICD-10-CM

## 2020-10-22 PROCEDURE — 93298 REM INTERROG DEV EVAL SCRMS: CPT

## 2020-10-22 PROCEDURE — 51700 IRRIGATION OF BLADDER: CPT

## 2020-10-22 PROCEDURE — G2066: CPT

## 2020-10-22 NOTE — ED PROVIDER NOTE - CHIEF COMPLAINT
Addended by: DEANGELO WEST on: 10/22/2020 06:37 PM     Modules accepted: Orders     The patient is a 62y Male complaining of fever.

## 2020-10-26 ENCOUNTER — RX RENEWAL (OUTPATIENT)
Age: 65
End: 2020-10-26

## 2020-10-26 RX ORDER — ACYCLOVIR 50 MG/G
5 OINTMENT TOPICAL 3 TIMES DAILY
Qty: 15 | Refills: 0 | Status: ACTIVE | COMMUNITY
Start: 2020-10-15 | End: 1900-01-01

## 2020-10-29 DIAGNOSIS — M79.10 MYALGIA, UNSPECIFIED SITE: ICD-10-CM

## 2020-11-04 ENCOUNTER — APPOINTMENT (OUTPATIENT)
Dept: UROLOGY | Facility: CLINIC | Age: 65
End: 2020-11-04
Payer: MEDICARE

## 2020-11-04 ENCOUNTER — OUTPATIENT (OUTPATIENT)
Dept: OUTPATIENT SERVICES | Facility: HOSPITAL | Age: 65
LOS: 1 days | End: 2020-11-04
Payer: MEDICARE

## 2020-11-04 VITALS — HEART RATE: 54 BPM | SYSTOLIC BLOOD PRESSURE: 145 MMHG | DIASTOLIC BLOOD PRESSURE: 82 MMHG

## 2020-11-04 VITALS — SYSTOLIC BLOOD PRESSURE: 131 MMHG | HEART RATE: 59 BPM | RESPIRATION RATE: 16 BRPM | DIASTOLIC BLOOD PRESSURE: 82 MMHG

## 2020-11-04 VITALS — TEMPERATURE: 97 F

## 2020-11-04 DIAGNOSIS — R39.15 URGENCY OF URINATION: ICD-10-CM

## 2020-11-04 DIAGNOSIS — R35.0 FREQUENCY OF MICTURITION: ICD-10-CM

## 2020-11-04 DIAGNOSIS — Z98.89 OTHER SPECIFIED POSTPROCEDURAL STATES: Chronic | ICD-10-CM

## 2020-11-04 PROCEDURE — 99213 OFFICE O/P EST LOW 20 MIN: CPT | Mod: 25

## 2020-11-04 PROCEDURE — 51700 IRRIGATION OF BLADDER: CPT

## 2020-11-16 ENCOUNTER — APPOINTMENT (OUTPATIENT)
Dept: UROLOGY | Facility: CLINIC | Age: 65
End: 2020-11-16

## 2020-11-23 ENCOUNTER — APPOINTMENT (OUTPATIENT)
Dept: UROLOGY | Facility: CLINIC | Age: 65
End: 2020-11-23

## 2020-11-23 ENCOUNTER — APPOINTMENT (OUTPATIENT)
Dept: UROLOGY | Facility: CLINIC | Age: 65
End: 2020-11-23
Payer: MEDICARE

## 2020-11-23 ENCOUNTER — OUTPATIENT (OUTPATIENT)
Dept: OUTPATIENT SERVICES | Facility: HOSPITAL | Age: 65
LOS: 1 days | End: 2020-11-23
Payer: MEDICARE

## 2020-11-23 VITALS — HEART RATE: 82 BPM | DIASTOLIC BLOOD PRESSURE: 83 MMHG | SYSTOLIC BLOOD PRESSURE: 122 MMHG | RESPIRATION RATE: 16 BRPM

## 2020-11-23 DIAGNOSIS — Z98.89 OTHER SPECIFIED POSTPROCEDURAL STATES: Chronic | ICD-10-CM

## 2020-11-23 DIAGNOSIS — R35.0 FREQUENCY OF MICTURITION: ICD-10-CM

## 2020-11-23 PROCEDURE — 51700 IRRIGATION OF BLADDER: CPT

## 2020-11-27 ENCOUNTER — APPOINTMENT (OUTPATIENT)
Dept: ELECTROPHYSIOLOGY | Facility: CLINIC | Age: 65
End: 2020-11-27
Payer: MEDICARE

## 2020-11-27 DIAGNOSIS — R39.15 URGENCY OF URINATION: ICD-10-CM

## 2020-11-27 PROCEDURE — G2066: CPT

## 2020-11-27 PROCEDURE — 93298 REM INTERROG DEV EVAL SCRMS: CPT

## 2020-12-04 ENCOUNTER — OUTPATIENT (OUTPATIENT)
Dept: OUTPATIENT SERVICES | Facility: HOSPITAL | Age: 65
LOS: 1 days | End: 2020-12-04
Payer: MEDICARE

## 2020-12-04 ENCOUNTER — APPOINTMENT (OUTPATIENT)
Dept: UROLOGY | Facility: CLINIC | Age: 65
End: 2020-12-04
Payer: MEDICARE

## 2020-12-04 VITALS — SYSTOLIC BLOOD PRESSURE: 105 MMHG | DIASTOLIC BLOOD PRESSURE: 66 MMHG | HEART RATE: 63 BPM

## 2020-12-04 DIAGNOSIS — R35.0 FREQUENCY OF MICTURITION: ICD-10-CM

## 2020-12-04 DIAGNOSIS — Z98.89 OTHER SPECIFIED POSTPROCEDURAL STATES: Chronic | ICD-10-CM

## 2020-12-04 PROCEDURE — 51700 IRRIGATION OF BLADDER: CPT

## 2020-12-07 ENCOUNTER — APPOINTMENT (OUTPATIENT)
Dept: PEDIATRIC ALLERGY IMMUNOLOGY | Facility: CLINIC | Age: 65
End: 2020-12-07

## 2020-12-09 DIAGNOSIS — R39.15 URGENCY OF URINATION: ICD-10-CM

## 2020-12-31 ENCOUNTER — NON-APPOINTMENT (OUTPATIENT)
Age: 65
End: 2020-12-31

## 2021-01-04 ENCOUNTER — APPOINTMENT (OUTPATIENT)
Dept: ELECTROPHYSIOLOGY | Facility: CLINIC | Age: 66
End: 2021-01-04
Payer: MEDICARE

## 2021-01-04 PROCEDURE — G2066: CPT

## 2021-01-04 PROCEDURE — 93298 REM INTERROG DEV EVAL SCRMS: CPT

## 2021-01-13 ENCOUNTER — RX RENEWAL (OUTPATIENT)
Age: 66
End: 2021-01-13

## 2021-01-20 ENCOUNTER — NON-APPOINTMENT (OUTPATIENT)
Age: 66
End: 2021-01-20

## 2021-02-08 ENCOUNTER — APPOINTMENT (OUTPATIENT)
Dept: ELECTROPHYSIOLOGY | Facility: CLINIC | Age: 66
End: 2021-02-08
Payer: MEDICARE

## 2021-02-08 PROCEDURE — G2066: CPT

## 2021-02-08 PROCEDURE — 93298 REM INTERROG DEV EVAL SCRMS: CPT

## 2021-03-04 ENCOUNTER — NON-APPOINTMENT (OUTPATIENT)
Age: 66
End: 2021-03-04

## 2021-03-15 ENCOUNTER — NON-APPOINTMENT (OUTPATIENT)
Age: 66
End: 2021-03-15

## 2021-03-15 ENCOUNTER — APPOINTMENT (OUTPATIENT)
Dept: ELECTROPHYSIOLOGY | Facility: CLINIC | Age: 66
End: 2021-03-15
Payer: MEDICARE

## 2021-03-15 PROCEDURE — 93298 REM INTERROG DEV EVAL SCRMS: CPT

## 2021-03-15 PROCEDURE — G2066: CPT

## 2021-03-25 ENCOUNTER — OUTPATIENT (OUTPATIENT)
Dept: OUTPATIENT SERVICES | Facility: HOSPITAL | Age: 66
LOS: 1 days | End: 2021-03-25
Payer: MEDICARE

## 2021-03-25 ENCOUNTER — APPOINTMENT (OUTPATIENT)
Dept: CT IMAGING | Facility: CLINIC | Age: 66
End: 2021-03-25
Payer: MEDICARE

## 2021-03-25 DIAGNOSIS — Z98.89 OTHER SPECIFIED POSTPROCEDURAL STATES: Chronic | ICD-10-CM

## 2021-03-25 DIAGNOSIS — J32.4 CHRONIC PANSINUSITIS: ICD-10-CM

## 2021-03-25 PROCEDURE — 70486 CT MAXILLOFACIAL W/O DYE: CPT | Mod: 26,MH

## 2021-03-25 PROCEDURE — 70486 CT MAXILLOFACIAL W/O DYE: CPT

## 2021-03-30 ENCOUNTER — RX RENEWAL (OUTPATIENT)
Age: 66
End: 2021-03-30

## 2021-04-11 ENCOUNTER — RX RENEWAL (OUTPATIENT)
Age: 66
End: 2021-04-11

## 2021-04-14 ENCOUNTER — NON-APPOINTMENT (OUTPATIENT)
Age: 66
End: 2021-04-14

## 2021-04-16 ENCOUNTER — APPOINTMENT (OUTPATIENT)
Dept: CARDIOLOGY | Facility: CLINIC | Age: 66
End: 2021-04-16
Payer: MEDICARE

## 2021-04-16 ENCOUNTER — NON-APPOINTMENT (OUTPATIENT)
Age: 66
End: 2021-04-16

## 2021-04-16 VITALS
HEART RATE: 44 BPM | BODY MASS INDEX: 26.46 KG/M2 | OXYGEN SATURATION: 96 % | DIASTOLIC BLOOD PRESSURE: 70 MMHG | SYSTOLIC BLOOD PRESSURE: 124 MMHG | WEIGHT: 159 LBS | TEMPERATURE: 97.4 F

## 2021-04-16 PROCEDURE — 99214 OFFICE O/P EST MOD 30 MIN: CPT

## 2021-04-16 PROCEDURE — 93000 ELECTROCARDIOGRAM COMPLETE: CPT

## 2021-04-16 RX ORDER — AMOXICILLIN AND CLAVULANATE POTASSIUM 875; 125 MG/1; MG/1
875-125 TABLET, COATED ORAL
Qty: 14 | Refills: 1 | Status: DISCONTINUED | COMMUNITY
Start: 2020-09-15 | End: 2021-04-16

## 2021-04-16 NOTE — HISTORY OF PRESENT ILLNESS
[FreeTextEntry1] : "Not totally inactive"\par Taking Praluent as directed.\par Sinus issues requiring surgery planned.  \par Feels good overal.\par Nothing on loop recorder.\par Walks a fair amount.(5k+ per day) and feels good, no chest pains,  but muscle aches persist.\par \par \par \par

## 2021-04-16 NOTE — DISCUSSION/SUMMARY
[FreeTextEntry1] : 66 year old with with coronary disease and Sarcoid with a history of vasovagal syncope. \par Loop recorder in place without reported arrhythmia.\par Atypical chest pain with prior CAD. Plan for stress echo to compare with 2018.\par \par Tolerating CAD meds. Check labs. Pt requested covid ab.\par Will continue Praluent/asa/high dose statin.  \par

## 2021-04-18 LAB
ALBUMIN SERPL ELPH-MCNC: 4.5 G/DL
ALP BLD-CCNC: 70 U/L
ALT SERPL-CCNC: 25 U/L
ANION GAP SERPL CALC-SCNC: 10 MMOL/L
AST SERPL-CCNC: 24 U/L
BASOPHILS # BLD AUTO: 0.05 K/UL
BASOPHILS NFR BLD AUTO: 0.9 %
BILIRUB SERPL-MCNC: 0.4 MG/DL
BUN SERPL-MCNC: 23 MG/DL
CALCIUM SERPL-MCNC: 9.6 MG/DL
CHLORIDE SERPL-SCNC: 104 MMOL/L
CHOLEST SERPL-MCNC: 101 MG/DL
CO2 SERPL-SCNC: 26 MMOL/L
COVID-19 SPIKE DOMAIN ANTIBODY INTERPRETATION: POSITIVE
CREAT SERPL-MCNC: 0.97 MG/DL
EOSINOPHIL # BLD AUTO: 0.76 K/UL
EOSINOPHIL NFR BLD AUTO: 14.3 %
ESTIMATED AVERAGE GLUCOSE: 117 MG/DL
GLUCOSE SERPL-MCNC: 96 MG/DL
HBA1C MFR BLD HPLC: 5.7 %
HCT VFR BLD CALC: 53.8 %
HDLC SERPL-MCNC: 56 MG/DL
HGB BLD-MCNC: 17 G/DL
IMM GRANULOCYTES NFR BLD AUTO: 0.4 %
LDLC SERPL CALC-MCNC: 31 MG/DL
LYMPHOCYTES # BLD AUTO: 1.41 K/UL
LYMPHOCYTES NFR BLD AUTO: 26.6 %
MAN DIFF?: NORMAL
MCHC RBC-ENTMCNC: 30.9 PG
MCHC RBC-ENTMCNC: 31.6 GM/DL
MCV RBC AUTO: 97.8 FL
MONOCYTES # BLD AUTO: 0.39 K/UL
MONOCYTES NFR BLD AUTO: 7.3 %
NEUTROPHILS # BLD AUTO: 2.68 K/UL
NEUTROPHILS NFR BLD AUTO: 50.5 %
NONHDLC SERPL-MCNC: 44 MG/DL
PLATELET # BLD AUTO: 169 K/UL
POTASSIUM SERPL-SCNC: 5 MMOL/L
PROT SERPL-MCNC: 6.1 G/DL
RBC # BLD: 5.5 M/UL
RBC # FLD: 13.2 %
SARS-COV-2 AB SERPL IA-ACNC: >250 U/ML
SODIUM SERPL-SCNC: 141 MMOL/L
TRIGL SERPL-MCNC: 69 MG/DL
TSH SERPL-ACNC: 2.37 UIU/ML
WBC # FLD AUTO: 5.31 K/UL

## 2021-04-19 ENCOUNTER — NON-APPOINTMENT (OUTPATIENT)
Age: 66
End: 2021-04-19

## 2021-04-19 ENCOUNTER — APPOINTMENT (OUTPATIENT)
Dept: ELECTROPHYSIOLOGY | Facility: CLINIC | Age: 66
End: 2021-04-19
Payer: MEDICARE

## 2021-04-19 PROCEDURE — 93298 REM INTERROG DEV EVAL SCRMS: CPT

## 2021-04-19 PROCEDURE — G2066: CPT

## 2021-05-24 ENCOUNTER — APPOINTMENT (OUTPATIENT)
Dept: ELECTROPHYSIOLOGY | Facility: CLINIC | Age: 66
End: 2021-05-24
Payer: MEDICARE

## 2021-05-24 ENCOUNTER — NON-APPOINTMENT (OUTPATIENT)
Age: 66
End: 2021-05-24

## 2021-05-24 PROCEDURE — G2066: CPT

## 2021-05-24 PROCEDURE — 93298 REM INTERROG DEV EVAL SCRMS: CPT

## 2021-06-28 ENCOUNTER — NON-APPOINTMENT (OUTPATIENT)
Age: 66
End: 2021-06-28

## 2021-06-28 ENCOUNTER — APPOINTMENT (OUTPATIENT)
Dept: ELECTROPHYSIOLOGY | Facility: CLINIC | Age: 66
End: 2021-06-28
Payer: MEDICARE

## 2021-06-28 PROCEDURE — 93298 REM INTERROG DEV EVAL SCRMS: CPT

## 2021-06-28 PROCEDURE — G2066: CPT

## 2021-07-01 ENCOUNTER — APPOINTMENT (OUTPATIENT)
Dept: UROLOGY | Facility: CLINIC | Age: 66
End: 2021-07-01
Payer: MEDICARE

## 2021-07-01 ENCOUNTER — OUTPATIENT (OUTPATIENT)
Dept: OUTPATIENT SERVICES | Facility: HOSPITAL | Age: 66
LOS: 1 days | End: 2021-07-01
Payer: MEDICARE

## 2021-07-01 VITALS — SYSTOLIC BLOOD PRESSURE: 127 MMHG | HEART RATE: 56 BPM | TEMPERATURE: 98 F | DIASTOLIC BLOOD PRESSURE: 80 MMHG

## 2021-07-01 DIAGNOSIS — R39.15 URGENCY OF URINATION: ICD-10-CM

## 2021-07-01 DIAGNOSIS — Z98.89 OTHER SPECIFIED POSTPROCEDURAL STATES: Chronic | ICD-10-CM

## 2021-07-01 DIAGNOSIS — R35.0 FREQUENCY OF MICTURITION: ICD-10-CM

## 2021-07-01 DIAGNOSIS — R35.1 NOCTURIA: ICD-10-CM

## 2021-07-01 PROCEDURE — 51700 IRRIGATION OF BLADDER: CPT

## 2021-07-01 PROCEDURE — 99213 OFFICE O/P EST LOW 20 MIN: CPT | Mod: 25

## 2021-07-07 ENCOUNTER — APPOINTMENT (OUTPATIENT)
Dept: UROLOGY | Facility: CLINIC | Age: 66
End: 2021-07-07
Payer: MEDICARE

## 2021-07-07 ENCOUNTER — OUTPATIENT (OUTPATIENT)
Dept: OUTPATIENT SERVICES | Facility: HOSPITAL | Age: 66
LOS: 1 days | End: 2021-07-07
Payer: MEDICARE

## 2021-07-07 VITALS
HEART RATE: 53 BPM | RESPIRATION RATE: 16 BRPM | TEMPERATURE: 97.5 F | SYSTOLIC BLOOD PRESSURE: 124 MMHG | DIASTOLIC BLOOD PRESSURE: 82 MMHG

## 2021-07-07 DIAGNOSIS — M79.10 MYALGIA, UNSPECIFIED SITE: ICD-10-CM

## 2021-07-07 DIAGNOSIS — Z98.89 OTHER SPECIFIED POSTPROCEDURAL STATES: Chronic | ICD-10-CM

## 2021-07-07 DIAGNOSIS — R35.1 NOCTURIA: ICD-10-CM

## 2021-07-07 DIAGNOSIS — R35.0 FREQUENCY OF MICTURITION: ICD-10-CM

## 2021-07-07 PROCEDURE — 51700 IRRIGATION OF BLADDER: CPT

## 2021-07-14 ENCOUNTER — APPOINTMENT (OUTPATIENT)
Dept: UROLOGY | Facility: CLINIC | Age: 66
End: 2021-07-14
Payer: MEDICARE

## 2021-07-14 ENCOUNTER — OUTPATIENT (OUTPATIENT)
Dept: OUTPATIENT SERVICES | Facility: HOSPITAL | Age: 66
LOS: 1 days | End: 2021-07-14
Payer: MEDICARE

## 2021-07-14 DIAGNOSIS — R35.1 NOCTURIA: ICD-10-CM

## 2021-07-14 DIAGNOSIS — Z98.89 OTHER SPECIFIED POSTPROCEDURAL STATES: Chronic | ICD-10-CM

## 2021-07-14 DIAGNOSIS — R35.0 FREQUENCY OF MICTURITION: ICD-10-CM

## 2021-07-14 PROCEDURE — 51700 IRRIGATION OF BLADDER: CPT

## 2021-07-21 ENCOUNTER — OUTPATIENT (OUTPATIENT)
Dept: OUTPATIENT SERVICES | Facility: HOSPITAL | Age: 66
LOS: 1 days | End: 2021-07-21
Payer: MEDICARE

## 2021-07-21 ENCOUNTER — APPOINTMENT (OUTPATIENT)
Dept: UROLOGY | Facility: CLINIC | Age: 66
End: 2021-07-21
Payer: MEDICARE

## 2021-07-21 VITALS
SYSTOLIC BLOOD PRESSURE: 135 MMHG | RESPIRATION RATE: 16 BRPM | DIASTOLIC BLOOD PRESSURE: 86 MMHG | TEMPERATURE: 97.5 F | HEART RATE: 62 BPM

## 2021-07-21 DIAGNOSIS — R39.15 URGENCY OF URINATION: ICD-10-CM

## 2021-07-21 DIAGNOSIS — R35.1 NOCTURIA: ICD-10-CM

## 2021-07-21 DIAGNOSIS — Z98.89 OTHER SPECIFIED POSTPROCEDURAL STATES: Chronic | ICD-10-CM

## 2021-07-21 DIAGNOSIS — R35.0 FREQUENCY OF MICTURITION: ICD-10-CM

## 2021-07-21 PROCEDURE — 51700 IRRIGATION OF BLADDER: CPT

## 2021-08-02 ENCOUNTER — APPOINTMENT (OUTPATIENT)
Dept: ELECTROPHYSIOLOGY | Facility: CLINIC | Age: 66
End: 2021-08-02
Payer: MEDICARE

## 2021-08-02 ENCOUNTER — APPOINTMENT (OUTPATIENT)
Dept: UROLOGY | Facility: CLINIC | Age: 66
End: 2021-08-02
Payer: MEDICARE

## 2021-08-02 ENCOUNTER — NON-APPOINTMENT (OUTPATIENT)
Age: 66
End: 2021-08-02

## 2021-08-02 ENCOUNTER — OUTPATIENT (OUTPATIENT)
Dept: OUTPATIENT SERVICES | Facility: HOSPITAL | Age: 66
LOS: 1 days | End: 2021-08-02
Payer: MEDICARE

## 2021-08-02 VITALS
BODY MASS INDEX: 25.55 KG/M2 | WEIGHT: 159 LBS | SYSTOLIC BLOOD PRESSURE: 136 MMHG | HEIGHT: 66 IN | HEART RATE: 46 BPM | TEMPERATURE: 97.1 F | DIASTOLIC BLOOD PRESSURE: 82 MMHG | RESPIRATION RATE: 17 BRPM

## 2021-08-02 VITALS
HEIGHT: 66 IN | HEART RATE: 45 BPM | DIASTOLIC BLOOD PRESSURE: 85 MMHG | RESPIRATION RATE: 17 BRPM | TEMPERATURE: 97.1 F | BODY MASS INDEX: 25.55 KG/M2 | WEIGHT: 159 LBS | SYSTOLIC BLOOD PRESSURE: 153 MMHG

## 2021-08-02 DIAGNOSIS — R35.0 FREQUENCY OF MICTURITION: ICD-10-CM

## 2021-08-02 DIAGNOSIS — Z98.89 OTHER SPECIFIED POSTPROCEDURAL STATES: Chronic | ICD-10-CM

## 2021-08-02 DIAGNOSIS — R39.15 URGENCY OF URINATION: ICD-10-CM

## 2021-08-02 PROCEDURE — G2066: CPT

## 2021-08-02 PROCEDURE — 51700 IRRIGATION OF BLADDER: CPT

## 2021-08-02 PROCEDURE — 93298 REM INTERROG DEV EVAL SCRMS: CPT

## 2021-08-09 ENCOUNTER — APPOINTMENT (OUTPATIENT)
Dept: UROLOGY | Facility: CLINIC | Age: 66
End: 2021-08-09
Payer: MEDICARE

## 2021-08-09 ENCOUNTER — OUTPATIENT (OUTPATIENT)
Dept: OUTPATIENT SERVICES | Facility: HOSPITAL | Age: 66
LOS: 1 days | End: 2021-08-09
Payer: MEDICARE

## 2021-08-09 VITALS
TEMPERATURE: 98 F | DIASTOLIC BLOOD PRESSURE: 90 MMHG | SYSTOLIC BLOOD PRESSURE: 150 MMHG | RESPIRATION RATE: 16 BRPM | HEART RATE: 62 BPM

## 2021-08-09 DIAGNOSIS — R39.15 URGENCY OF URINATION: ICD-10-CM

## 2021-08-09 DIAGNOSIS — Z98.89 OTHER SPECIFIED POSTPROCEDURAL STATES: Chronic | ICD-10-CM

## 2021-08-09 DIAGNOSIS — R35.1 NOCTURIA: ICD-10-CM

## 2021-08-09 DIAGNOSIS — R35.0 FREQUENCY OF MICTURITION: ICD-10-CM

## 2021-08-09 PROCEDURE — 51700 IRRIGATION OF BLADDER: CPT

## 2021-08-12 ENCOUNTER — RX RENEWAL (OUTPATIENT)
Age: 66
End: 2021-08-12

## 2021-08-16 ENCOUNTER — APPOINTMENT (OUTPATIENT)
Dept: UROLOGY | Facility: CLINIC | Age: 66
End: 2021-08-16
Payer: MEDICARE

## 2021-08-16 ENCOUNTER — OUTPATIENT (OUTPATIENT)
Dept: OUTPATIENT SERVICES | Facility: HOSPITAL | Age: 66
LOS: 1 days | End: 2021-08-16
Payer: MEDICARE

## 2021-08-16 VITALS — DIASTOLIC BLOOD PRESSURE: 81 MMHG | SYSTOLIC BLOOD PRESSURE: 143 MMHG | HEART RATE: 53 BPM

## 2021-08-16 DIAGNOSIS — Z98.89 OTHER SPECIFIED POSTPROCEDURAL STATES: Chronic | ICD-10-CM

## 2021-08-16 DIAGNOSIS — R35.0 FREQUENCY OF MICTURITION: ICD-10-CM

## 2021-08-16 DIAGNOSIS — R39.15 URGENCY OF URINATION: ICD-10-CM

## 2021-08-16 DIAGNOSIS — R35.1 NOCTURIA: ICD-10-CM

## 2021-08-16 PROCEDURE — 51700 IRRIGATION OF BLADDER: CPT

## 2021-08-26 ENCOUNTER — APPOINTMENT (OUTPATIENT)
Dept: UROLOGY | Facility: CLINIC | Age: 66
End: 2021-08-26
Payer: MEDICARE

## 2021-08-26 ENCOUNTER — OUTPATIENT (OUTPATIENT)
Dept: OUTPATIENT SERVICES | Facility: HOSPITAL | Age: 66
LOS: 1 days | End: 2021-08-26
Payer: MEDICARE

## 2021-08-26 VITALS
TEMPERATURE: 98 F | BODY MASS INDEX: 25.55 KG/M2 | HEART RATE: 52 BPM | HEIGHT: 66 IN | WEIGHT: 159 LBS | SYSTOLIC BLOOD PRESSURE: 135 MMHG | DIASTOLIC BLOOD PRESSURE: 83 MMHG

## 2021-08-26 VITALS — HEART RATE: 52 BPM | DIASTOLIC BLOOD PRESSURE: 76 MMHG | SYSTOLIC BLOOD PRESSURE: 128 MMHG

## 2021-08-26 DIAGNOSIS — Z98.89 OTHER SPECIFIED POSTPROCEDURAL STATES: Chronic | ICD-10-CM

## 2021-08-26 DIAGNOSIS — R35.0 FREQUENCY OF MICTURITION: ICD-10-CM

## 2021-08-26 PROCEDURE — 51700 IRRIGATION OF BLADDER: CPT

## 2021-09-02 ENCOUNTER — APPOINTMENT (OUTPATIENT)
Dept: UROLOGY | Facility: CLINIC | Age: 66
End: 2021-09-02

## 2021-09-07 ENCOUNTER — NON-APPOINTMENT (OUTPATIENT)
Age: 66
End: 2021-09-07

## 2021-09-07 ENCOUNTER — APPOINTMENT (OUTPATIENT)
Dept: ELECTROPHYSIOLOGY | Facility: CLINIC | Age: 66
End: 2021-09-07
Payer: MEDICARE

## 2021-09-07 PROCEDURE — G2066: CPT

## 2021-09-07 PROCEDURE — 93298 REM INTERROG DEV EVAL SCRMS: CPT

## 2021-09-08 DIAGNOSIS — R35.1 NOCTURIA: ICD-10-CM

## 2021-09-08 DIAGNOSIS — R39.15 URGENCY OF URINATION: ICD-10-CM

## 2021-09-09 ENCOUNTER — APPOINTMENT (OUTPATIENT)
Dept: PULMONOLOGY | Facility: CLINIC | Age: 66
End: 2021-09-09
Payer: MEDICARE

## 2021-09-09 VITALS
TEMPERATURE: 97.2 F | HEIGHT: 66 IN | SYSTOLIC BLOOD PRESSURE: 121 MMHG | BODY MASS INDEX: 26.68 KG/M2 | WEIGHT: 166 LBS | OXYGEN SATURATION: 95 % | HEART RATE: 67 BPM | RESPIRATION RATE: 16 BRPM | DIASTOLIC BLOOD PRESSURE: 70 MMHG

## 2021-09-09 DIAGNOSIS — J32.9 CHRONIC SINUSITIS, UNSPECIFIED: ICD-10-CM

## 2021-09-09 PROCEDURE — 99214 OFFICE O/P EST MOD 30 MIN: CPT | Mod: 25

## 2021-09-09 PROCEDURE — 71046 X-RAY EXAM CHEST 2 VIEWS: CPT | Mod: 26

## 2021-09-09 NOTE — HISTORY OF PRESENT ILLNESS
[TextBox_4] : Mr. CROSS is a 66 year year old male with a history of cardiac disease, sarcoidosis, asthma, allergy, OSAS, s/p syncope (loop recorder) who presents for an acute pulmonary evaluation.\par \par His chief concern is intermittent, dry cough x 4-5 days. \par \par Patient notes yesterday, , he had a virtual Visit w/ Edin ENT MD of [facility] d/t sinus complaints. He was RX'ed Prednisone x 7 days & Augementin. He notes he only started the Prednisone, but plans on picking up the antibiotic today. Patient notes he has been using  Tessalon Pearls to help with cough, but has not felt improvement. Pt states cough worsens at night & post meals. \par \par Patient notes he is not on any inhaler therapy. \par Patient was tested for Covid-19 infection on 21 and resulted negative. \par \par Patient denies fever/chills, decreased appetite, fatigue chest tightness, wheezing, SOB @ rest or exertion, or any other issues than stated above at this time.

## 2021-09-09 NOTE — REVIEW OF SYSTEMS
[Nasal Congestion] : nasal congestion [Sinus Problems] : sinus problems [Cough] : cough [SOB on Exertion] : sob on exertion [Negative] : Endocrine [Chest Tightness] : no chest tightness [Dyspnea] : no dyspnea [Wheezing] : no wheezing

## 2021-09-09 NOTE — ASSESSMENT
[FreeTextEntry1] : Mr. CROSS is a 66 year year old male with a history of cardiac disease, sarcoidosis, asthma, allergy, OSAS, s/p syncope (loop recorder) who presents for an acute pulmonary evaluation.\par \par 1. Cough:\par - likely r/t sinus congestion, GERD, asthma, sarcoid.\par - CXR in office @ baseline.\par - Benzonatate RX'ed per pt request, but explained treating the other factors (GERD) would be beneficial. \par \par 2. Sarcoidosis:\par - Chest CT RX'ed.\par - Need for PFTs @ next visit. \par \par 3. Asthma:\par - Add Albuterol 2 puffs Q4-6H.\par \par 4. GERD:\par - Add Omeprazole 40 30 minutes before breakfast\par - Add pepcid to 40 mg after dinner. \par \par 5. Chornic Sinus congestion:\par - Add Abx. therapy per ENT MD.\par - Continue Prednisone per ENT MD. \par \par Patient to follow up with Dr. Slaughter for visit and PFTs in 4-6 months. \par Patient to call with further questions and concerns.\par Patient verbalizes understanding of care plan and is agreeable.\par

## 2021-09-09 NOTE — PROCEDURE
[FreeTextEntry1] : CXR in office; Read by Dr. Slaughter. \par Impression: Normal appearing heart. Prominent bilateral hilar adenopathy r/t hx of Sarcoid.  No infiltrates, effusion or opacities noted.\par \par

## 2021-09-13 ENCOUNTER — NON-APPOINTMENT (OUTPATIENT)
Age: 66
End: 2021-09-13

## 2021-09-14 ENCOUNTER — APPOINTMENT (OUTPATIENT)
Dept: CT IMAGING | Facility: IMAGING CENTER | Age: 66
End: 2021-09-14
Payer: MEDICARE

## 2021-09-14 ENCOUNTER — OUTPATIENT (OUTPATIENT)
Dept: OUTPATIENT SERVICES | Facility: HOSPITAL | Age: 66
LOS: 1 days | End: 2021-09-14
Payer: MEDICARE

## 2021-09-14 DIAGNOSIS — Z98.89 OTHER SPECIFIED POSTPROCEDURAL STATES: Chronic | ICD-10-CM

## 2021-09-14 DIAGNOSIS — D86.9 SARCOIDOSIS, UNSPECIFIED: ICD-10-CM

## 2021-09-14 PROCEDURE — 71250 CT THORAX DX C-: CPT | Mod: 26,MH

## 2021-09-14 PROCEDURE — 71250 CT THORAX DX C-: CPT

## 2021-09-16 ENCOUNTER — NON-APPOINTMENT (OUTPATIENT)
Age: 66
End: 2021-09-16

## 2021-09-17 ENCOUNTER — APPOINTMENT (OUTPATIENT)
Dept: UROLOGY | Facility: CLINIC | Age: 66
End: 2021-09-17

## 2021-09-21 LAB — ACE BLD-CCNC: <5 U/L

## 2021-09-22 LAB — SARS-COV-2 N GENE NPH QL NAA+PROBE: NOT DETECTED

## 2021-09-22 NOTE — ED ADULT NURSE NOTE - RESPIRATORY WDL
Breathing spontaneous and unlabored. Breath sounds clear and equal bilaterally with regular rhythm.
[Follow-Up: _____] : a [unfilled] follow-up visit

## 2021-09-23 ENCOUNTER — APPOINTMENT (OUTPATIENT)
Dept: UROLOGY | Facility: CLINIC | Age: 66
End: 2021-09-23

## 2021-09-23 ENCOUNTER — APPOINTMENT (OUTPATIENT)
Dept: PULMONOLOGY | Facility: CLINIC | Age: 66
End: 2021-09-23

## 2021-09-23 ENCOUNTER — APPOINTMENT (OUTPATIENT)
Dept: PULMONOLOGY | Facility: CLINIC | Age: 66
End: 2021-09-23
Payer: MEDICARE

## 2021-09-23 VITALS
TEMPERATURE: 97.3 F | DIASTOLIC BLOOD PRESSURE: 78 MMHG | HEIGHT: 66 IN | WEIGHT: 166 LBS | OXYGEN SATURATION: 95 % | HEART RATE: 62 BPM | SYSTOLIC BLOOD PRESSURE: 120 MMHG | BODY MASS INDEX: 26.68 KG/M2 | RESPIRATION RATE: 16 BRPM

## 2021-09-23 LAB
ALTERN TENCAPG(M6): <2 MCG/ML
ASPER FUMCAPG(M3): 5.9 MCG/ML
AUREOBASCAPG(M12): <2 MCG/ML
MICROPOLYCAPG(M22): <2 MCG/ML
PENIC CHRYCAPG(M1): 2.8 MCG/ML
PHOMA BETAE IGG: <2 MCG/ML
THERMOCAPG(M23): <2 MCG/ML
TRICHODERMA VIRIDE IGG: 2.9 MCG/ML

## 2021-09-23 PROCEDURE — 94729 DIFFUSING CAPACITY: CPT

## 2021-09-23 PROCEDURE — 94010 BREATHING CAPACITY TEST: CPT

## 2021-09-23 PROCEDURE — ZZZZZ: CPT

## 2021-09-23 PROCEDURE — 94727 GAS DIL/WSHOT DETER LNG VOL: CPT

## 2021-09-23 PROCEDURE — 99214 OFFICE O/P EST MOD 30 MIN: CPT | Mod: 25

## 2021-09-23 PROCEDURE — 95012 NITRIC OXIDE EXP GAS DETER: CPT

## 2021-09-23 PROCEDURE — 94618 PULMONARY STRESS TESTING: CPT

## 2021-09-23 RX ORDER — METHYLPREDNISOLONE 4 MG/1
4 TABLET ORAL
Qty: 21 | Refills: 1 | Status: DISCONTINUED | COMMUNITY
Start: 2020-09-15 | End: 2021-09-23

## 2021-09-23 RX ORDER — AMOXICILLIN AND CLAVULANATE POTASSIUM 875; 125 MG/1; MG/1
875-125 TABLET, COATED ORAL
Qty: 14 | Refills: 0 | Status: DISCONTINUED | COMMUNITY
Start: 2020-09-25 | End: 2021-09-23

## 2021-09-23 NOTE — HISTORY OF PRESENT ILLNESS
[FreeTextEntry1] : Mr. CROSS is a 66 year year old male with a history of cardiac disease, sarcoidosis, asthma, allergy, OSAS, s/p syncope (loop recorder) who presents today for a follow-up pulmonary evaluation. His chief complaint is cough.\par -he notes feeling a cough unlike any other cough he has had\par -he notes it is a persistent dry cough\par -he notes it makes his voice hoarse and can keep him up at night\par -he notes it is unresponsive to Mucinex and Tessalon Perles \par -he thinks it is due to a sarcoidosis flair \par -he notes having significant sinus issues and feels like he can't breathe sometime\par -he notes sinus polyps\par -he notes systemic inflammatory issues as well \par -he notes those issues were alleviated to some extent with Dupixent\par -he notes being on the Dupixent for about a year (300 every 2 weeks)\par -he notes taking the Dupixent for the sinuses\par -he notes the sinus issues improved to a tolerable level\par -he notes when he took the COVID-19 vaccine, the sinus issues became worse\par -he notes the sinus issues became worse in the last 2-3 months\par -he notes developing the cough in the last few weeks\par -he notes some dysphagia which causes him to cough\par -he notes taking steroids (currently on the fifth day) which have been making him sweat \par -he notes feeling a little bit better on the steroids\par -he denies wheezing\par -he notes feeling a little bit SOB\par -he notes being on 20 mg QDay of prednisone\par \par patient denies any headaches, nausea, vomiting, fever, chills, chest pain, chest pressure, palpitations, wheezing, fatigue, diarrhea, constipation, myalgias, dizziness, leg swelling, leg pain, itchy eyes, itchy ears, heartburn, reflux or sour taste in the mouth

## 2021-09-23 NOTE — REASON FOR VISIT
[Follow-Up] : a follow-up visit [FreeTextEntry1] : OSAS, sarcoidosis, asthma, allergy, chronic sinus Dz, OSAS, s/p syncope (loop recorder) - ?active sarcoidosis vs. infection 9/2021

## 2021-09-23 NOTE — PROCEDURE
[FreeTextEntry1] : Full PFT revealed normal flows, with a FEV1 of 3.73L, which is 132% of predicted, normal lung volumes, and a diffusion of 23.8, which is 120% of predicted, with a normal flow volume loop \par \par Feno was 13; a normal value being less than 25. Fractional exhaled nitric oxide (FENO) is regarded as a simple, noninvasive method for assessing eosinophilic airway inflammation. Produced by a variety of cells within the lung, nitric oxide (NO) concentrations are generally low in healthy individuals. However, high concentrations of NO appear to be involved in nonspecific host defense mechanisms and chronic inflammatory  diseases such as asthma. The American Thoracic Society (ATS) therefore recommended using FENO to aid in the diagnosis and monitoring of eosinophilic airway inflammation and asthma, and for identifying steroid responsive individuals whose chronic respiratory symptoms may be caused by airway inflammation \par \par 6 minute walk test reveals a low saturation of 94% with very slight dyspnea or fatigue; walked 586.8 meters\par \par High Resolution Chest CT (9.14.2021) revealed New groundglass in the lower lobes, unclear if due to atelectasis or true pathology such as infection. Follow-up chest CT with prone imaging is recommended. Stable other areas of groundglass dating back to 2012, presumably related to reported sarcoidosis.\par  \par -Images and procedures reviewed in detail and discussed with patient.

## 2021-09-23 NOTE — ADDENDUM
[FreeTextEntry1] : Documented by Mauro Her acting as a scribe for Dr. Nazario Slaughter on (09/23/2021).\par \par All medical record entries made by the Scribe were at my, Dr. Nazario Slaughter's, direction and personally dictated by me on (09/23/2021). I have reviewed the chart and agree that the record accurately reflects my personal performance of the history, physical exam, assessment and plan. I have also personally directed, reviewed, and agree with the discharge instructions.\par

## 2021-09-23 NOTE — ASSESSMENT
[FreeTextEntry1] : Mr. Feliz is a 66 year old male with a history of cardiac disease, sarcoidosis, asthma, allergy, OSAS, s/p syncope (loop recorder) presenting to the office today. Currently ?active sarcoidosis vs. infection vs. BOOP\par \par His shortness of breath is felt to be multifactorial due to: \par -chest pain syndrome being costochondritis \par -cardiac disease\par -poor breathing mechanics\par -out of shape\par -asthma (stable)\par -sarcoidosis\par \par problem 1: asthma\par -Continue Symbicort 160 2 inhalations BID \par -continue Singulair 10 mg before bed\par -continue Spiriva 2 puffs QD \par -Asthma is  believed to be caused by inherited (genetic) and environmental factor, but its exact cause is unknown. Asthma may be triggered by allergens, lung infections, or irritants in the air. Asthma triggers are different for each person\par -Inhaler technique reviewed as well as oral hygiene techniques reviewed with patient. Avoidance of cold air, extremes of temperature, rescue inhaler should be used before exercise. Order of medication reviewed with patient. Recommended use of a cool mist humidifier in the bedroom.\par \par problem 2: allergic rhinitis/sinusitis (NONCOMPLIANT) -Active\par -On Dupixent 300 [id number]\par -Continue Clarinex 5mg QHS \par -he is being recommended to use nasal saline - Navage.\par -continue Budesonide nasal spray 1 sniff each nostril BID or Qnasl 1 sniff BID or Xhance 1 sniff BID\par -continue Olopatadine 1 sniff each nostril BID\par \par Environmental measures for allergies were encouraged including mattress and pillow cover, air purifier, and environmental controls. \par \par problem 3: GERD\par -continue Pepcid 40 mg QHS\par -Rule of 2's- Avoid eating too much, too late, too poorly, too spicy, or two hours before bed \par -Things to avoid including overeating, spicy foods, tight clothing, eating within three hours of bed, this list is not all inclusive. \par -For treatment of reflux, possible options discussed including diet control, H2 blockers, PPIs, as well as coating motility agents discussed as treatment options. Timing of meals and proximity of last meal to sleep were discussed. If symptoms persist, a formal gastrointestinal evaluation is needed.\par \par problem 4: NIKKY\par -he is being recommended an oral appliance and highly encourage to follow up with Dr. Nawaf Hoang; Bryn\par -Discussed the risks/associations with coronary artery disease, atrial fibrillation, arrhythmia, memory loss, issues with concentration, stroke risk, hypertension, nocturia, chronic reflux/Hassan’s esophagus some but not all inclusive. Treatment options discussed including CPAP/BiPAP machine, oral appliance, ProVent therapy, Oxy-Aid by [employer]pite[employer], new technologies, or positional sleep. \par \par problem 5: sarcoidosis (?active) vs. infection vs. BOOP\par -Add Prednisone 20mg QDay x 1 month then reevaluate \par -Information sheet given to the patient to be reviewed, this medication is never to be used without consulting the prescribing physician. Proper dietary restraint is necessary specifically salt containing foods, if any reaction may occur should be reported. \par \par -add Biaxin 500 mg BID x 2 weeks\par -Complete follow-up CXR in 1 month\par -Complete CT in 2 months\par -continue yearly ophthalmological evaluations/PFTs\par -Sarcoidosis is a medical mystery and can present with very serious illness or little consequence. The disease can affect any organ including skin, liver, lymph glands, spleen, eyes, nervous system, musculoskeletal system, heart, brain, kidneys, and including the lungs. Pulmonary sarcoidosis can cause loss of lung volume and abnormal lung stiffness. This disease is characterized by presence of granulomas, small areas of inflamed cells. Sarcoidosis patients should have regular cardiac and eye examinations as well as regular PFTs.\par \par Problem 5A:\par -Add Neurontin 100 qHS (no interest)\par \par problem 6: cardiac disease\par -continue to follow up with the cardiologist Dr. Foster\par \par problem 7: overweight\par -Weight loss, exercise, and diet control were discussed and are highly encouraged. Treatment options were given such as, aqua therapy, and contacting a nutritionist. Recommended to use the elliptical, stationary bike, less use of treadmill.  Obesity is associated with worsening asthma, shortness of breath, and potential for cardiac disease, diabetes, and other underlying medical conditions. \par \par Problem 8: Costochondritis (Quiet)\par -recommended Topricin cream\par -recommend P6 cream\par \par Problem 9: Health Maintenance/COVID19 Precautions:\par -Covid 19 vaccine discussed at length with patient; booster discussed and recommended to wait for vaccine containing new delta variant \par - Clean your hands often. Wash your hands often with soap and water for at least 20 seconds, especially after blowing your nose, coughing, or sneezing, or having been in a public place.\par - If soap and water are not available, use a hand  that contains at least 60% alcohol.\par - To the extent possible, avoid touching high-touch surfaces in public places - elevator buttons, door handles, handrails, handshaking with people, etc. Use a tissue or your sleeve to cover your hand or finger if you must touch something.\par - Wash your hands after touching surfaces in public places.\par - Avoid touching your face, nose, eyes, etc.\par - Clean and disinfect your home to remove germs: practice routine cleaning of frequently touched surfaces (for example: tables, doorknobs, light switches, handles, desks, toilets, faucets, sinks & cell phones)\par - Avoid crowds, especially in poorly ventilated spaces. Your risk of exposure to respiratory viruses like COVID-19 may increase in crowded, closed-in settings with little air circulation if\par there are people in the crowd who are sick. All patients are recommended to practice social distancing and stay at least 6 feet away from others.\par - Avoid all non-essential travel including plane trips, and especially avoid embarking on cruise ships.\par -If COVID-19 is spreading in your community, take extra measures to put distance between yourself and other people to further reduce your risk of being exposed to this new virus.\par -Stay home as much as possible.\par - Consider ways of getting food brought to your house through family, social, or commercial networks\par -Be aware that the virus has been known to live in the air up to 3 hours post exposure, cardboard up to 24 hours post exposure, copper up to 4 hours post exposure, steel and plastic up to 2-3 days post exposure. Risk of transmission from these surfaces are affected by many variables.\par \par Immune Support Recommendations:\par \par -OTC Vitamin C 500mg BID \par -OTC Quercetin 250-500mg BID \par -OTC Zinc 75-100mg per day \par -OTC Melatonin 1or 2mg a night \par -OTC Vitamin D 1-4000mg per day \par \par Asthma and COVID19:\par \par You need to make sure your asthma is under control. This often requires the use of inhaled corticosteroids (and sometimes oral corticosteroids). Inhaled corticosteroids do not likely reduce your immune system’s ability to fight infections, but oral corticosteroids may. It is important to use the steps above to protect yourself to limit your exposure to any respiratory virus. \par \par Problem 10: health maintenance\par -continue Urology f/u\par -recommended a yearly flu shot after October 15 (11.22.19)\par -recommended strep pneumonia vaccines: Prevnar-13 vaccine, followed by Pneumo vaccine 23 on year following\par -recommended early intervention for URIs\par -recommended osteoporosis evaluations\par -recommended early dermatological evaluations\par -recommended after the age of 50 to the age of 70, colonoscopy every 5 years\par -encouraged early intervention\par \par F/U in 4 months with spi\par He is encouraged to call with any changes, concerns, or questions

## 2021-09-29 RX ORDER — MOMETASONE 50 UG/1
50 SPRAY, METERED NASAL
Qty: 1 | Refills: 3 | Status: ACTIVE | COMMUNITY
Start: 2021-09-29 | End: 1900-01-01

## 2021-09-29 RX ORDER — FLUTICASONE PROPIONATE 93 UG/1
93 SPRAY, METERED NASAL
Qty: 3 | Refills: 1 | Status: DISCONTINUED | COMMUNITY
Start: 2021-09-23 | End: 2021-09-29

## 2021-09-30 ENCOUNTER — APPOINTMENT (OUTPATIENT)
Dept: UROLOGY | Facility: CLINIC | Age: 66
End: 2021-09-30

## 2021-10-05 ENCOUNTER — APPOINTMENT (OUTPATIENT)
Dept: CARDIOLOGY | Facility: CLINIC | Age: 66
End: 2021-10-05
Payer: MEDICARE

## 2021-10-05 ENCOUNTER — NON-APPOINTMENT (OUTPATIENT)
Age: 66
End: 2021-10-05

## 2021-10-05 VITALS
BODY MASS INDEX: 25.99 KG/M2 | HEART RATE: 56 BPM | SYSTOLIC BLOOD PRESSURE: 116 MMHG | DIASTOLIC BLOOD PRESSURE: 70 MMHG | WEIGHT: 161 LBS | OXYGEN SATURATION: 96 %

## 2021-10-05 DIAGNOSIS — R06.00 DYSPNEA, UNSPECIFIED: ICD-10-CM

## 2021-10-05 PROCEDURE — 99215 OFFICE O/P EST HI 40 MIN: CPT

## 2021-10-05 PROCEDURE — 93000 ELECTROCARDIOGRAM COMPLETE: CPT

## 2021-10-05 NOTE — HISTORY OF PRESENT ILLNESS
[FreeTextEntry1] : He has been undergoing treatment for active sarcoidosis that has affected his lungs.  His most prominent complaint is a dry nonproductive cough.\par His biggest concern for today's visit though is his severe coronary calcification reported on the CT scan of his chest.  He has no exertional chest pains or shortness of breath beyond the usual.\par \par \par Prior:\par "Not totally inactive"\par Taking Praluent as directed.\par Sinus issues requiring surgery planned.  \par Feels good overal.\par Nothing on loop recorder.\par Walks a fair amount.(5k+ per day) and feels good, no chest pains,  but muscle aches persist.\par \par \par \par

## 2021-10-05 NOTE — DISCUSSION/SUMMARY
[FreeTextEntry1] : 66 year old with with coronary disease and now active sarcoidosis.\par There is no sign of sarcoid infiltration of the myocardium on today's ECG.  Review of his cardiac monitor shows no new arrhythmias concerning for infiltrative cardiomyopathy.\par I have suggested that he undergo echocardiography in order to exclude any wall motion abnormalities that may be a sign of sarcoidosis.\par Given the acute phase of his sarcoidosis I have suggested that he undergo a cardiac PET/CT to exclude active myocardial inflammation that would benefit from more aggressive medical therapy.\par He should continue his current dose of prednisone.  He is planning to seek further sarcoid related information from a specialist at [place] later today.\par I reminded him that Dr. Seaman has expertise in this realm as well.\par His coronary disease does not appear to be the cause of his symptoms and I reassured him that the coronary calcification is a known entity in his case and he should continue aggressive medical therapy including aspirin statin and Praluent.\par Labs drawn.

## 2021-10-06 ENCOUNTER — NON-APPOINTMENT (OUTPATIENT)
Age: 66
End: 2021-10-06

## 2021-10-06 LAB
ALBUMIN SERPL ELPH-MCNC: 4.2 G/DL
ALP BLD-CCNC: 62 U/L
ALT SERPL-CCNC: 34 U/L
ANION GAP SERPL CALC-SCNC: 13 MMOL/L
AST SERPL-CCNC: 23 U/L
BASOPHILS # BLD AUTO: 0.02 K/UL
BASOPHILS NFR BLD AUTO: 0.2 %
BILIRUB SERPL-MCNC: 0.4 MG/DL
BUN SERPL-MCNC: 32 MG/DL
CALCIUM SERPL-MCNC: 9.4 MG/DL
CHLORIDE SERPL-SCNC: 101 MMOL/L
CHOLEST SERPL-MCNC: 145 MG/DL
CO2 SERPL-SCNC: 24 MMOL/L
CREAT SERPL-MCNC: 1.17 MG/DL
EOSINOPHIL # BLD AUTO: 0.14 K/UL
EOSINOPHIL NFR BLD AUTO: 1.6 %
GLUCOSE SERPL-MCNC: 95 MG/DL
HCT VFR BLD CALC: 48.8 %
HDLC SERPL-MCNC: 81 MG/DL
HGB BLD-MCNC: 16.1 G/DL
IMM GRANULOCYTES NFR BLD AUTO: 0.6 %
LDLC SERPL CALC-MCNC: 51 MG/DL
LYMPHOCYTES # BLD AUTO: 1.85 K/UL
LYMPHOCYTES NFR BLD AUTO: 21.1 %
MAN DIFF?: NORMAL
MCHC RBC-ENTMCNC: 30.6 PG
MCHC RBC-ENTMCNC: 33 GM/DL
MCV RBC AUTO: 92.8 FL
MONOCYTES # BLD AUTO: 0.68 K/UL
MONOCYTES NFR BLD AUTO: 7.8 %
NEUTROPHILS # BLD AUTO: 6.03 K/UL
NEUTROPHILS NFR BLD AUTO: 68.7 %
NONHDLC SERPL-MCNC: 64 MG/DL
NT-PROBNP SERPL-MCNC: 164 PG/ML
PLATELET # BLD AUTO: 188 K/UL
POTASSIUM SERPL-SCNC: 4.4 MMOL/L
PROT SERPL-MCNC: 5.9 G/DL
RBC # BLD: 5.26 M/UL
RBC # FLD: 13.3 %
SODIUM SERPL-SCNC: 139 MMOL/L
TRIGL SERPL-MCNC: 64 MG/DL
TROPONIN I SERPL-MCNC: <0.01 NG/ML
WBC # FLD AUTO: 8.77 K/UL

## 2021-10-07 ENCOUNTER — APPOINTMENT (OUTPATIENT)
Dept: UROLOGY | Facility: CLINIC | Age: 66
End: 2021-10-07

## 2021-10-07 ENCOUNTER — APPOINTMENT (OUTPATIENT)
Dept: CARDIOLOGY | Facility: CLINIC | Age: 66
End: 2021-10-07
Payer: MEDICARE

## 2021-10-07 PROCEDURE — 93306 TTE W/DOPPLER COMPLETE: CPT

## 2021-10-11 ENCOUNTER — NON-APPOINTMENT (OUTPATIENT)
Age: 66
End: 2021-10-11

## 2021-10-11 ENCOUNTER — APPOINTMENT (OUTPATIENT)
Dept: ELECTROPHYSIOLOGY | Facility: CLINIC | Age: 66
End: 2021-10-11
Payer: MEDICARE

## 2021-10-11 PROCEDURE — 93298 REM INTERROG DEV EVAL SCRMS: CPT

## 2021-10-11 PROCEDURE — G2066: CPT

## 2021-10-12 ENCOUNTER — NON-APPOINTMENT (OUTPATIENT)
Age: 66
End: 2021-10-12

## 2021-10-18 ENCOUNTER — RX RENEWAL (OUTPATIENT)
Age: 66
End: 2021-10-18

## 2021-10-19 ENCOUNTER — APPOINTMENT (OUTPATIENT)
Dept: PULMONOLOGY | Facility: CLINIC | Age: 66
End: 2021-10-19
Payer: MEDICARE

## 2021-10-19 VITALS
TEMPERATURE: 97.2 F | DIASTOLIC BLOOD PRESSURE: 80 MMHG | RESPIRATION RATE: 16 BRPM | WEIGHT: 162 LBS | HEART RATE: 58 BPM | BODY MASS INDEX: 27.66 KG/M2 | HEIGHT: 64 IN | OXYGEN SATURATION: 96 % | SYSTOLIC BLOOD PRESSURE: 120 MMHG

## 2021-10-19 DIAGNOSIS — J45.901 UNSPECIFIED ASTHMA WITH (ACUTE) EXACERBATION: ICD-10-CM

## 2021-10-19 PROCEDURE — 99214 OFFICE O/P EST MOD 30 MIN: CPT | Mod: 25

## 2021-10-19 PROCEDURE — 71046 X-RAY EXAM CHEST 2 VIEWS: CPT

## 2021-10-19 NOTE — ASSESSMENT
[FreeTextEntry1] : Mr. Feliz is a 66 year old male with a history of cardiac disease, sarcoidosis, asthma, allergy, OSAS, s/p syncope (loop recorder) presenting to the office today. Currently c/w ?active sarcoidosis improved - with steroids\par \par His shortness of breath is felt to be multifactorial due to: \par -chest pain syndrome being costochondritis \par -cardiac disease\par -poor breathing mechanics\par -out of shape\par -asthma (stable)\par -sarcoidosis\par \par problem 1: asthma\par -Continue Symbicort 160 2 inhalations BID \par -continue Singulair 10 mg before bed\par -continue Spiriva 2 puffs QD \par -Asthma is  believed to be caused by inherited (genetic) and environmental factor, but its exact cause is unknown. Asthma may be triggered by allergens, lung infections, or irritants in the air. Asthma triggers are different for each person\par -Inhaler technique reviewed as well as oral hygiene techniques reviewed with patient. Avoidance of cold air, extremes of temperature, rescue inhaler should be used before exercise. Order of medication reviewed with patient. Recommended use of a cool mist humidifier in the bedroom.\par \par problem 2: allergic rhinitis/sinusitis (NONCOMPLIANT) -Active\par -On Dupixent 300 [id number]\par -Continue Clarinex 5mg QHS \par -he is being recommended to use nasal saline - Navage.\par -continue Budesonide nasal spray 1 sniff each nostril BID or Qnasl 1 sniff BID or Xhance 1 sniff BID\par -continue Olopatadine 1 sniff each nostril BID\par \par Environmental measures for allergies were encouraged including mattress and pillow cover, air purifier, and environmental controls. \par \par problem 3: GERD\par -continue Pepcid 40 mg QHS\par -Rule of 2's- Avoid eating too much, too late, too poorly, too spicy, or two hours before bed \par -Things to avoid including overeating, spicy foods, tight clothing, eating within three hours of bed, this list is not all inclusive. \par -For treatment of reflux, possible options discussed including diet control, H2 blockers, PPIs, as well as coating motility agents discussed as treatment options. Timing of meals and proximity of last meal to sleep were discussed. If symptoms persist, a formal gastrointestinal evaluation is needed.\par \par problem 4: NIKKY\par -he is being recommended an oral appliance and highly encourage to follow up with Dr. Nawaf Hoang; [place]\par -Discussed the risks/associations with coronary artery disease, atrial fibrillation, arrhythmia, memory loss, issues with concentration, stroke risk, hypertension, nocturia, chronic reflux/Hassan’s esophagus some but not all inclusive. Treatment options discussed including CPAP/BiPAP machine, oral appliance, ProVent therapy, Oxy-Aid by [employer], new technologies, or positional sleep. \par \par problem 5: sarcoidosis (?active) vs. ?infection vs. BOOP - improved (2nd opinion Dr. Christen Gallegos [facility])\par -s/p Prednisone 20mg QDay x 1 month then reevaluate - alternate 20 mg even/15 mg odd\par -Information sheet given to the patient to be reviewed, this medication is never to be used without consulting the prescribing physician. Proper dietary restraint is necessary specifically salt containing foods, if any reaction may occur should be reported. \par \par -s/p Biaxin 500 mg BID x 2 weeks\par -Complete follow-up CXR in 2 months\par -Complete CT in 2 months - 11/15/2021\par -continue yearly ophthalmological evaluations/PFTs\par -Sarcoidosis is a medical mystery and can present with very serious illness or little consequence. The disease can affect any organ including skin, liver, lymph glands, spleen, eyes, nervous system, musculoskeletal system, heart, brain, kidneys, and including the lungs. Pulmonary sarcoidosis can cause loss of lung volume and abnormal lung stiffness. This disease is characterized by presence of granulomas, small areas of inflamed cells. Sarcoidosis patients should have regular cardiac and eye examinations as well as regular PFTs.\par \par Problem 5A:\par -continue Neurontin 100 qHS (no interest)\par \par problem 6: cardiac disease\par -continue to follow up with the cardiologist Dr. Foster\par -Awaiting cardiac MRI\par \par problem 7: overweight\par -Weight loss, exercise, and diet control were discussed and are highly encouraged. Treatment options were given such as, aqua therapy, and contacting a nutritionist. Recommended to use the elliptical, stationary bike, less use of treadmill.  Obesity is associated with worsening asthma, shortness of breath, and potential for cardiac disease, diabetes, and other underlying medical conditions. \par \par Problem 8: Costochondritis (Quiet)\par -recommended Topricin cream\par -recommend P6 cream\par \par Problem 9: Health Maintenance/COVID19 Precautions:\par - S/p Covid 19 vaccine (Moderna) x2 \par -Covid 19 vaccine discussed at length with patient; booster discussed and recommended to wait for vaccine containing new delta variant \par - Clean your hands often. Wash your hands often with soap and water for at least 20 seconds, especially after blowing your nose, coughing, or sneezing, or having been in a public place.\par - If soap and water are not available, use a hand  that contains at least 60% alcohol.\par - To the extent possible, avoid touching high-touch surfaces in public places - elevator buttons, door handles, handrails, handshaking with people, etc. Use a tissue or your sleeve to cover your hand or finger if you must touch something.\par - Wash your hands after touching surfaces in public places.\par - Avoid touching your face, nose, eyes, etc.\par - Clean and disinfect your home to remove germs: practice routine cleaning of frequently touched surfaces (for example: tables, doorknobs, light switches, handles, desks, toilets, faucets, sinks & cell phones)\par - Avoid crowds, especially in poorly ventilated spaces. Your risk of exposure to respiratory viruses like COVID-19 may increase in crowded, closed-in settings with little air circulation if\par there are people in the crowd who are sick. All patients are recommended to practice social distancing and stay at least 6 feet away from others.\par - Avoid all non-essential travel including plane trips, and especially avoid embarking on cruise ships.\par -If COVID-19 is spreading in your community, take extra measures to put distance between yourself and other people to further reduce your risk of being exposed to this new virus.\par -Stay home as much as possible.\par - Consider ways of getting food brought to your house through family, social, or commercial networks\par -Be aware that the virus has been known to live in the air up to 3 hours post exposure, cardboard up to 24 hours post exposure, copper up to 4 hours post exposure, steel and plastic up to 2-3 days post exposure. Risk of transmission from these surfaces are affected by many variables.\par \par Immune Support Recommendations:\par \par -OTC Vitamin C 500mg BID \par -OTC Quercetin 250-500mg BID \par -OTC Zinc 75-100mg per day \par -OTC Melatonin 1or 2mg a night \par -OTC Vitamin D 1-4000mg per day \par \par Asthma and COVID19:\par \par You need to make sure your asthma is under control. This often requires the use of inhaled corticosteroids (and sometimes oral corticosteroids). Inhaled corticosteroids do not likely reduce your immune system’s ability to fight infections, but oral corticosteroids may. It is important to use the steps above to protect yourself to limit your exposure to any respiratory virus. \par \par Problem 10: health maintenance\par -continue Urology f/u\par -recommended a yearly flu shot after October 15 (11.22.19)\par -recommended strep pneumonia vaccines: Prevnar-13 vaccine, followed by Pneumo vaccine 23 on year following\par -recommended early intervention for URIs\par -recommended osteoporosis evaluations\par -recommended early dermatological evaluations\par -recommended after the age of 50 to the age of 70, colonoscopy every 5 years\par -encouraged early intervention\par \par F/U in 4 months with spi\par He is encouraged to call with any changes, concerns, or questions

## 2021-10-19 NOTE — PROCEDURE
[FreeTextEntry1] : Echocardiogram (10/7/2021) revealed \par 1. Mitral annular calcification. Mild mitral regurgitation\par 2. Normal trileaflet aortic valve\par 3. Normal aortic root size. (Ao: 3.3 cm at the sinuses of Valsalva)\par 4. Normal left atrium. LA volume index = 25 cc/m2\par 5. Normal left ventricular internal dimensions and wall thickness\par 6. Normal left ventricular systolic function. No segmental wall motion abnormalities\par 7. Mild diastolic dysfunction (Stage 1)\par 8. Normal right ventricular systolic pressure equals 17 mm Hg, assuming right atrial pressure equals 3 mm Hg, consistent with normal pulmonary pressures\par 10. Normal pericardium with no pericardial effusion\par \par CXR-normal size heart and resolution of prior bilateral infiltrates, no effusions; linear scar Right lung

## 2021-10-19 NOTE — ADDENDUM
[FreeTextEntry1] : Documented by Mauro Her acting as a scribe for Dr. Nazario Slaughter on (10/19/2021).\par \par All medical record entries made by the Scribe were at my, Dr. Nazario Slaughter's, direction and personally dictated by me on (10/19/2021). I have reviewed the chart and agree that the record accurately reflects my personal performance of the history, physical exam, assessment and plan. I have also personally directed, reviewed, and agree with the discharge instructions.\par

## 2021-10-19 NOTE — HISTORY OF PRESENT ILLNESS
[FreeTextEntry1] : Mr. CROSS is a 66 year year old male with a history of cardiac disease, sarcoidosis, asthma, allergy, OSAS, s/p syncope (loop recorder) who presents today for a follow-up pulmonary evaluation. His chief complaint is cough.\par -he notes feeling better now than his last visit\par -he notes being on 20 mg prednisone daily for a month\par -he notes a cough at night sometimes that keeps him up\par -he notes if his sarcoidosis, the coughing is usually a hallmark \par -he notes falling asleep at 11:30-12 but he is up all night due to the steroids and he has been this way for a few years\par -he notes frequent small bowel movements in the morning\par -he notes some reflux but is taking pepcid in the morning and night that helps but is not 100% effective\par -he notes walking for exercise (8,000 steps per day)\par -he notes having some joint aches due to prednisone\par \par patient denies any headaches, nausea, vomiting, fever, chills, sweats, chest pain, chest pressure, palpitations, wheezing, fatigue, diarrhea, constipation, dysphagia, myalgias, dizziness, leg swelling, leg pain, itchy eyes, itchy ears, or sour taste in the mouth

## 2021-10-19 NOTE — PHYSICAL EXAM
[No Acute Distress] : no acute distress [Well Nourished] : well nourished [Normal Appearance] : normal appearance [Well Groomed] : well groomed [No Deformities] : no deformities [Well Developed] : well developed [III] : Mallampati Class: III [TextBox_68] : I:E 1:3; Clear

## 2021-10-21 ENCOUNTER — RESULT REVIEW (OUTPATIENT)
Age: 66
End: 2021-10-21

## 2021-10-21 ENCOUNTER — OUTPATIENT (OUTPATIENT)
Dept: OUTPATIENT SERVICES | Facility: HOSPITAL | Age: 66
LOS: 1 days | End: 2021-10-21
Payer: MEDICARE

## 2021-10-21 ENCOUNTER — APPOINTMENT (OUTPATIENT)
Dept: NUCLEAR MEDICINE | Facility: IMAGING CENTER | Age: 66
End: 2021-10-21
Payer: MEDICARE

## 2021-10-21 DIAGNOSIS — D86.9 SARCOIDOSIS, UNSPECIFIED: ICD-10-CM

## 2021-10-21 DIAGNOSIS — Z98.89 OTHER SPECIFIED POSTPROCEDURAL STATES: Chronic | ICD-10-CM

## 2021-10-21 PROCEDURE — 78429 MYOCRD IMG PET 1 STD W/CT: CPT | Mod: 26

## 2021-10-21 PROCEDURE — 78451 HT MUSCLE IMAGE SPECT SING: CPT

## 2021-10-21 PROCEDURE — 78429 MYOCRD IMG PET 1 STD W/CT: CPT

## 2021-10-21 PROCEDURE — 78451 HT MUSCLE IMAGE SPECT SING: CPT | Mod: 26,MH

## 2021-10-21 PROCEDURE — A9500: CPT

## 2021-10-21 PROCEDURE — A9552: CPT

## 2021-10-22 ENCOUNTER — NON-APPOINTMENT (OUTPATIENT)
Age: 66
End: 2021-10-22

## 2021-11-12 ENCOUNTER — NON-APPOINTMENT (OUTPATIENT)
Age: 66
End: 2021-11-12

## 2021-11-15 ENCOUNTER — APPOINTMENT (OUTPATIENT)
Dept: ELECTROPHYSIOLOGY | Facility: CLINIC | Age: 66
End: 2021-11-15
Payer: MEDICARE

## 2021-11-15 ENCOUNTER — NON-APPOINTMENT (OUTPATIENT)
Age: 66
End: 2021-11-15

## 2021-11-15 PROCEDURE — G2066: CPT | Mod: NC

## 2021-11-15 PROCEDURE — 93298 REM INTERROG DEV EVAL SCRMS: CPT | Mod: NC

## 2021-12-02 ENCOUNTER — NON-APPOINTMENT (OUTPATIENT)
Age: 66
End: 2021-12-02

## 2021-12-02 ENCOUNTER — APPOINTMENT (OUTPATIENT)
Dept: CARDIOLOGY | Facility: CLINIC | Age: 66
End: 2021-12-02
Payer: MEDICARE

## 2021-12-02 VITALS
RESPIRATION RATE: 17 BRPM | OXYGEN SATURATION: 96 % | HEIGHT: 64 IN | HEART RATE: 49 BPM | BODY MASS INDEX: 28.17 KG/M2 | DIASTOLIC BLOOD PRESSURE: 80 MMHG | SYSTOLIC BLOOD PRESSURE: 122 MMHG | WEIGHT: 165 LBS

## 2021-12-02 DIAGNOSIS — R07.9 CHEST PAIN, UNSPECIFIED: ICD-10-CM

## 2021-12-02 PROCEDURE — 93000 ELECTROCARDIOGRAM COMPLETE: CPT

## 2021-12-02 PROCEDURE — 99214 OFFICE O/P EST MOD 30 MIN: CPT

## 2021-12-02 NOTE — DISCUSSION/SUMMARY
[FreeTextEntry1] : 66 year old with with coronary disease and now active sarcoidosis.\par There is no sign of sarcoid infiltration of the myocardium on PET CT. No changes on today's ECG.   \par I reminded him that Dr. Seaman has expertise in this realm as well.\par His coronary disease does not appear to be the cause of his symptoms and I reassured him. Continue aggressive medical therapy including aspirin, statin and Praluent.\par Labs drawn to workup fatigue. \par spike protein due to ongoing prednisone.\par

## 2021-12-02 NOTE — HISTORY OF PRESENT ILLNESS
[FreeTextEntry1] : Has been on tapering dose of prednisone for his sarcoid lung issues.\par Left sided chest, neck, shoulder pain on and off for the past two weeks.Tender and positional. Significant fatigue "to the bone". Poor sleeping too.\par \par Planning chest CT for sarcoid, as cough is returning.\par \par Prior:\par \par He has been undergoing treatment for active sarcoidosis that has affected his lungs.  His most prominent complaint is a dry nonproductive cough.\par His biggest concern for today's visit though is his severe coronary calcification reported on the CT scan of his chest.  He has no exertional chest pains or shortness of breath beyond the usual.\par \par \par Prior:\par "Not totally inactive"\par Taking Praluent as directed.\par Sinus issues requiring surgery planned.  \par Feels good overal.\par Nothing on loop recorder.\par Walks a fair amount.(5k+ per day) and feels good, no chest pains,  but muscle aches persist.\par \par \par \par

## 2021-12-08 DIAGNOSIS — Z01.812 ENCOUNTER FOR PREPROCEDURAL LABORATORY EXAMINATION: ICD-10-CM

## 2021-12-09 LAB
ALBUMIN SERPL ELPH-MCNC: 4.6 G/DL
ALP BLD-CCNC: 48 U/L
ALT SERPL-CCNC: 24 U/L
ANION GAP SERPL CALC-SCNC: 16 MMOL/L
AST SERPL-CCNC: 18 U/L
BASOPHILS # BLD AUTO: 0.02 K/UL
BASOPHILS NFR BLD AUTO: 0.3 %
BILIRUB SERPL-MCNC: 0.8 MG/DL
BUN SERPL-MCNC: 20 MG/DL
CALCIUM SERPL-MCNC: 9.8 MG/DL
CHLORIDE SERPL-SCNC: 99 MMOL/L
CO2 SERPL-SCNC: 25 MMOL/L
COVID-19 SPIKE DOMAIN ANTIBODY INTERPRETATION: POSITIVE
CREAT SERPL-MCNC: 0.98 MG/DL
CRP SERPL-MCNC: <3 MG/L
EOSINOPHIL # BLD AUTO: 0.08 K/UL
EOSINOPHIL NFR BLD AUTO: 1.1 %
ERYTHROCYTE [SEDIMENTATION RATE] IN BLOOD BY WESTERGREN METHOD: 11 MM/HR
GLUCOSE SERPL-MCNC: 83 MG/DL
HCT VFR BLD CALC: 52.1 %
HGB BLD-MCNC: 16.8 G/DL
IMM GRANULOCYTES NFR BLD AUTO: 0.4 %
LYMPHOCYTES # BLD AUTO: 1.1 K/UL
LYMPHOCYTES NFR BLD AUTO: 14.7 %
MAGNESIUM SERPL-MCNC: 2 MG/DL
MAN DIFF?: NORMAL
MCHC RBC-ENTMCNC: 31.2 PG
MCHC RBC-ENTMCNC: 32.2 GM/DL
MCV RBC AUTO: 96.7 FL
MONOCYTES # BLD AUTO: 0.51 K/UL
MONOCYTES NFR BLD AUTO: 6.8 %
NEUTROPHILS # BLD AUTO: 5.75 K/UL
NEUTROPHILS NFR BLD AUTO: 76.7 %
NT-PROBNP SERPL-MCNC: 169 PG/ML
PLATELET # BLD AUTO: 171 K/UL
POTASSIUM SERPL-SCNC: 4.8 MMOL/L
PROT SERPL-MCNC: 6.4 G/DL
RBC # BLD: 5.39 M/UL
RBC # FLD: 14.6 %
SARS-COV-2 AB SERPL IA-ACNC: >250 U/ML
SODIUM SERPL-SCNC: 139 MMOL/L
TROPONIN I SERPL-MCNC: <0.01 NG/ML
TSH SERPL-ACNC: 1.77 UIU/ML
WBC # FLD AUTO: 7.49 K/UL

## 2021-12-13 ENCOUNTER — APPOINTMENT (OUTPATIENT)
Dept: CT IMAGING | Facility: IMAGING CENTER | Age: 66
End: 2021-12-13
Payer: MEDICARE

## 2021-12-13 ENCOUNTER — OUTPATIENT (OUTPATIENT)
Dept: OUTPATIENT SERVICES | Facility: HOSPITAL | Age: 66
LOS: 1 days | End: 2021-12-13
Payer: MEDICARE

## 2021-12-13 DIAGNOSIS — R05.9 COUGH, UNSPECIFIED: ICD-10-CM

## 2021-12-13 DIAGNOSIS — D86.9 SARCOIDOSIS, UNSPECIFIED: ICD-10-CM

## 2021-12-13 DIAGNOSIS — Z98.89 OTHER SPECIFIED POSTPROCEDURAL STATES: Chronic | ICD-10-CM

## 2021-12-13 DIAGNOSIS — R93.89 ABNORMAL FINDINGS ON DIAGNOSTIC IMAGING OF OTHER SPECIFIED BODY STRUCTURES: ICD-10-CM

## 2021-12-13 PROCEDURE — 71250 CT THORAX DX C-: CPT

## 2021-12-13 PROCEDURE — 71250 CT THORAX DX C-: CPT | Mod: 26,MH

## 2021-12-16 ENCOUNTER — NON-APPOINTMENT (OUTPATIENT)
Age: 66
End: 2021-12-16

## 2021-12-21 ENCOUNTER — APPOINTMENT (OUTPATIENT)
Dept: ELECTROPHYSIOLOGY | Facility: CLINIC | Age: 66
End: 2021-12-21
Payer: MEDICARE

## 2021-12-21 ENCOUNTER — NON-APPOINTMENT (OUTPATIENT)
Age: 66
End: 2021-12-21

## 2021-12-21 PROCEDURE — 93298 REM INTERROG DEV EVAL SCRMS: CPT

## 2021-12-21 PROCEDURE — G2066: CPT

## 2021-12-22 ENCOUNTER — APPOINTMENT (OUTPATIENT)
Dept: PULMONOLOGY | Facility: CLINIC | Age: 66
End: 2021-12-22
Payer: MEDICARE

## 2021-12-22 PROCEDURE — 99214 OFFICE O/P EST MOD 30 MIN: CPT | Mod: 95

## 2021-12-22 NOTE — HISTORY OF PRESENT ILLNESS
[Home] : at home, [unfilled] , at the time of the visit. [Medical Office: ([facility])___] : at the medical office located in  [Verbal consent obtained from patient] : the patient, [unfilled] [FreeTextEntry3] : . [FreeTextEntry1] : Mr. CROSS is a 66 year year old male with a history of cardiac disease, sarcoidosis, asthma, allergy, OSAS, s/p syncope (loop recorder) who presents today via video call  for a follow-up pulmonary evaluation. His chief complaint is \par - he notes he has been feeling good \par - he notes he has some stuffiness in the sinuses but not much in the chest\par - he notes he has a mild cough \par - no chest pains / pressure \par - no diarrhea / constipation\par - no difficulty swallowing\par - he notes his bowels are okay \par - sense of smell / taste is fine \par - no visual symptoms \par - he notes having some reflux \par - he has been using a nasal spray / saline solution\par - he is now down to 7.5 - 5 mg of prednisone \par - when hes getting off prednisone he feels sinus symptoms. \par - \par - He  denies any visual issues, headaches, nausea, vomiting, fever, chills, sweats, chest pains, chest pressure, diarrhea, constipation, dysphagia, myalgia, dizziness, leg swelling, leg pain, itchy eyes, itchy ears.

## 2021-12-22 NOTE — ASSESSMENT
[FreeTextEntry1] : Mr. Feliz is a 66 year old male with a history of cardiac disease, sarcoidosis, asthma, allergy, OSAS, s/p syncope (loop recorder) presenting to the office today via video call . Currently c/w ?active sarcoidosis improved - with steroids; #1 issue is sinus \par \par His shortness of breath is felt to be multifactorial due to: \par -chest pain syndrome being costochondritis \par -cardiac disease\par -poor breathing mechanics\par -out of shape\par -asthma (stable)\par -sarcoidosis\par \par problem 1: asthma- (controlled)\par -Continue Symbicort 160 2 inhalations BID \par -continue Singulair 10 mg before bed\par -continue Spiriva 2 puffs QD \par -Asthma is  believed to be caused by inherited (genetic) and environmental factor, but its exact cause is unknown. Asthma may be triggered by allergens, lung infections, or irritants in the air. Asthma triggers are different for each person\par -Inhaler technique reviewed as well as oral hygiene techniques reviewed with patient. Avoidance of cold air, extremes of temperature, rescue inhaler should be used before exercise. Order of medication reviewed with patient. Recommended use of a cool mist humidifier in the bedroom.\par \par problem 2: allergic rhinitis/sinusitis (NONCOMPLIANT) -Active#1 \par -On Dupixent 300 [id number]\par -Continue Clarinex 5mg QHS \par -he is being recommended to use nasal saline - Navage.\par - Qnasl 1 sniff BID or Xhance 1 sniff BID (Budesonide .5 BID)\par -continue Olopatadine .6 sniff each nostril BID\par \par Environmental measures for allergies were encouraged including mattress and pillow cover, air purifier, and environmental controls. \par \par problem 3: GERD\par -continue Pepcid 40 mg QHS\par -Rule of 2's- Avoid eating too much, too late, too poorly, too spicy, or two hours before bed \par -Things to avoid including overeating, spicy foods, tight clothing, eating within three hours of bed, this list is not all inclusive. \par -For treatment of reflux, possible options discussed including diet control, H2 blockers, PPIs, as well as coating motility agents discussed as treatment options. Timing of meals and proximity of last meal to sleep were discussed. If symptoms persist, a formal gastrointestinal evaluation is needed.\par \par problem 4: NIKKY\par -he is being recommended an oral appliance and highly encourage to follow up with Dr. Nawaf Clemente\par -Discussed the risks/associations with coronary artery disease, atrial fibrillation, arrhythmia, memory loss, issues with concentration, stroke risk, hypertension, nocturia, chronic reflux/Hassan’s esophagus some but not all inclusive. Treatment options discussed including CPAP/BiPAP machine, oral appliance, ProVent therapy, Oxy-Aid by [employer], new technologies, or positional sleep. \par \par problem 5: sarcoidosis (?active) vs. ?infection vs. BOOP - improved (2nd opinion Dr. Christen Gallegos [facility])\par -s/p Prednisone 20mg QDay x 1 month then reevaluate - alternate 20 mg even/15 mg odd - move to 5mg qd x2weeks then reduce by 1mg every 2weeks to off \par -Information sheet given to the patient to be reviewed, this medication is never to be used without consulting the prescribing physician. Proper dietary restraint is necessary specifically salt containing foods, if any reaction may occur should be reported. \par \par -s/p Biaxin 500 mg BID x 2 weeks\par -Complete follow-up CXR in 2 months\par -s/p CT in 2 months - 11/15/2021- returned to normal \par -continue yearly ophthalmological evaluations/PFTs\par -Sarcoidosis is a medical mystery and can present with very serious illness or little consequence. The disease can affect any organ including skin, liver, lymph glands, spleen, eyes, nervous system, musculoskeletal system, heart, brain, kidneys, and including the lungs. Pulmonary sarcoidosis can cause loss of lung volume and abnormal lung stiffness. This disease is characterized by presence of granulomas, small areas of inflamed cells. Sarcoidosis patients should have regular cardiac and eye examinations as well as regular PFTs.\par \par Problem 5A:\par -continue Neurontin 100 qHS (no interest)\par \par problem 6: cardiac disease\par -continue to follow up with the cardiologist Dr. Foster\par -Awaiting cardiac MRI\par \par problem 7: overweight\par -Weight loss, exercise, and diet control were discussed and are highly encouraged. Treatment options were given such as, aqua therapy, and contacting a nutritionist. Recommended to use the elliptical, stationary bike, less use of treadmill.  Obesity is associated with worsening asthma, shortness of breath, and potential for cardiac disease, diabetes, and other underlying medical conditions. \par \par Problem 8: Costochondritis (Quiet)\par -recommended Topricin cream\par -recommend P6 cream\par \par Problem 9: Health Maintenance/COVID19 Precautions:\par - S/p Covid 19 vaccine (Moderna) x3 \par -Covid 19 vaccine discussed at length with patient; booster discussed and recommended to wait for vaccine containing new delta variant \par - Clean your hands often. Wash your hands often with soap and water for at least 20 seconds, especially after blowing your nose, coughing, or sneezing, or having been in a public place.\par - If soap and water are not available, use a hand  that contains at least 60% alcohol.\par - To the extent possible, avoid touching high-touch surfaces in public places - elevator buttons, door handles, handrails, handshaking with people, etc. Use a tissue or your sleeve to cover your hand or finger if you must touch something.\par - Wash your hands after touching surfaces in public places.\par - Avoid touching your face, nose, eyes, etc.\par - Clean and disinfect your home to remove germs: practice routine cleaning of frequently touched surfaces (for example: tables, doorknobs, light switches, handles, desks, toilets, faucets, sinks & cell phones)\par - Avoid crowds, especially in poorly ventilated spaces. Your risk of exposure to respiratory viruses like COVID-19 may increase in crowded, closed-in settings with little air circulation if\par there are people in the crowd who are sick. All patients are recommended to practice social distancing and stay at least 6 feet away from others.\par Immune Support Recommendations:\par \par -OTC Vitamin C 500mg BID \par -OTC Quercetin 250-500mg BID \par -OTC Zinc 75-100mg per day \par -OTC Melatonin 1or 2mg a night \par -OTC Vitamin D 1-4000mg per day \par \par \par \par Problem 10: health maintenance\par -continue Urology f/u\par -recommended a yearly flu shot after October 15 (11.22.19)\par -recommended strep pneumonia vaccines: Prevnar-13 vaccine, followed by Pneumo vaccine 23 on year following\par -recommended early intervention for URIs\par -recommended osteoporosis evaluations\par -recommended early dermatological evaluations\par -recommended after the age of 50 to the age of 70, colonoscopy every 5 years\par -encouraged early intervention\par \par F/U in 4 months with spi\par He is encouraged to call with any changes, concerns, or questions

## 2021-12-22 NOTE — PROCEDURE
[FreeTextEntry1] : CT (DEC.15.2021) IMPRESSION: stable appearance of upper lobe predominant ill-defined ground glass densities since 2012. Stable small calcified mediastinal and hilar lymph nodes. No new findings.

## 2021-12-22 NOTE — REASON FOR VISIT
[Follow-Up] : a follow-up visit [FreeTextEntry1] : via video call - OSAS, sarcoidosis, asthma, allergy, chronic sinus Dz, OSAS, s/p syncope (loop recorder) - ?active sarcoidosis vs. infection 9/2021

## 2021-12-22 NOTE — ADDENDUM
[FreeTextEntry1] : Documented by Sushila Redmond acting as a scribe for Dr. Nazario Slaughter on 12/22/2021 \par \par All medical record entries made by the Scribe were at my, Dr. Nazario Slaughter's, direction and personally dictated by me on 12/22/2021 . I have reviewed the chart and agree that the record accurately reflects my personal performance of the history, physical exam, assessment and plan. I have also personally directed, reviewed, and agree with the discharge instructions.

## 2022-01-05 ENCOUNTER — APPOINTMENT (OUTPATIENT)
Dept: PULMONOLOGY | Facility: CLINIC | Age: 67
End: 2022-01-05

## 2022-01-25 ENCOUNTER — APPOINTMENT (OUTPATIENT)
Dept: ELECTROPHYSIOLOGY | Facility: CLINIC | Age: 67
End: 2022-01-25

## 2022-01-25 ENCOUNTER — APPOINTMENT (OUTPATIENT)
Dept: ELECTROPHYSIOLOGY | Facility: CLINIC | Age: 67
End: 2022-01-25
Payer: MEDICARE

## 2022-01-25 ENCOUNTER — NON-APPOINTMENT (OUTPATIENT)
Age: 67
End: 2022-01-25

## 2022-01-25 PROCEDURE — 93298 REM INTERROG DEV EVAL SCRMS: CPT

## 2022-01-25 PROCEDURE — G2066: CPT

## 2022-03-01 ENCOUNTER — APPOINTMENT (OUTPATIENT)
Dept: ELECTROPHYSIOLOGY | Facility: CLINIC | Age: 67
End: 2022-03-01
Payer: MEDICARE

## 2022-03-01 ENCOUNTER — NON-APPOINTMENT (OUTPATIENT)
Age: 67
End: 2022-03-01

## 2022-03-01 PROCEDURE — 93298 REM INTERROG DEV EVAL SCRMS: CPT

## 2022-03-01 PROCEDURE — G2066: CPT

## 2022-03-03 ENCOUNTER — APPOINTMENT (OUTPATIENT)
Dept: PULMONOLOGY | Facility: CLINIC | Age: 67
End: 2022-03-03
Payer: MEDICARE

## 2022-03-03 VITALS
SYSTOLIC BLOOD PRESSURE: 126 MMHG | HEIGHT: 64 IN | OXYGEN SATURATION: 96 % | DIASTOLIC BLOOD PRESSURE: 76 MMHG | WEIGHT: 165 LBS | BODY MASS INDEX: 28.17 KG/M2 | RESPIRATION RATE: 16 BRPM | HEART RATE: 52 BPM | TEMPERATURE: 97.3 F

## 2022-03-03 PROCEDURE — 71046 X-RAY EXAM CHEST 2 VIEWS: CPT | Mod: 26

## 2022-03-03 PROCEDURE — 99214 OFFICE O/P EST MOD 30 MIN: CPT | Mod: 25

## 2022-03-03 RX ORDER — OMEPRAZOLE 40 MG/1
40 CAPSULE, DELAYED RELEASE ORAL
Qty: 1 | Refills: 1 | Status: ACTIVE | COMMUNITY
Start: 2022-03-03 | End: 1900-01-01

## 2022-03-03 NOTE — HISTORY OF PRESENT ILLNESS
[FreeTextEntry1] : Mr. CROSS is a 66 year year old male with a history of cardiac disease, sarcoidosis, asthma, allergy, OSAS, s/p syncope (loop recorder) who presents today for an acute cough. \par \par Patient states that his cough started 5 days ago- intermittent with fits of cough. He sometimes wakes up at night to cough but deals with poor sleep in general. \par He is concerned that this may be a sarcoidosis flare. \par His most recent CT was improved and back to baseline from prior.\par He was on a long course of steroids with his last sarcoidosis flare and stopped approx 5 weeks ago. \par He is NOT on inhalers. He felt the albuterol may have helped him yesterday (first time he tried it for the cough).\par He admits to increased reflux symptoms. Taking OTC pepcid only. \par Has chronic sinus issues. \par He is not on Singulair currently.\par \par Does not feel "sick." Just dealing with cough. \par No fevers, chills, chest pain or pressure, sore throat, ear pain or pressure.

## 2022-03-03 NOTE — PROCEDURE
[FreeTextEntry1] : CXR in office; Read by Dr. Slaughter. \par Impression: Normal appearing heart. No infiltrates, effusion or opacities noted. No evidence of sarcoidosis being active.

## 2022-03-03 NOTE — REVIEW OF SYSTEMS
[Nasal Congestion] : nasal congestion [Sinus Problems] : sinus problems [Cough] : cough [GERD] : gerd [Negative] : Endocrine [Chest Tightness] : no chest tightness [Sputum] : no sputum [Dyspnea] : no dyspnea [Pleuritic Pain] : no pleuritic pain [Wheezing] : no wheezing [SOB on Exertion] : no sob on exertion

## 2022-03-03 NOTE — ASSESSMENT
[FreeTextEntry1] : Mr. Feliz is a 66 year old male with a history of cardiac disease, sarcoidosis, asthma, allergy, OSAS, s/p syncope (loop recorder) presenting to the office today due to cough- which is likely multifactorial due to active/untreated asthma, sinus hx and undertreated GERD.\par \par problem 1: asthma- active with cough\par -restart Symbicort 160 2 inhalations BID \par -restart Singulair 10 mg before bed\par -restart Spiriva 2 puffs QD (gave 1 month samples to patient)\par -add tessalon perles 200 mg PO TID PRN cough\par -add prednisone 20 mg x 7 days then 10 mg x 7 days if not improving\par \par problem 2: allergic rhinitis/sinus issues- advised to ensure complaints\par -On Dupixent 300 [id number]\par -Continue Clarinex 5mg QHS \par -he is being recommended to use nasal saline - Navage.\par - Qnasl 1 sniff BID or Xhance 1 sniff BID (Budesonide .5 BID)\par -continue Olopatadine .6 sniff each nostril BID\par \par Environmental measures for allergies were encouraged including mattress and pillow cover, air purifier, and environmental controls. \par \par problem 3: GERD-active\par -add omeprazole 40 mg PO 30 min before breakfast\par -start  Pepcid 40 mg QHS\par -Rule of 2's- Avoid eating too much, too late, too poorly, too spicy, or two hours before bed \par -Things to avoid including overeating, spicy foods, tight clothing, eating within three hours of bed, this list is not all inclusive. \par \par problem 4: sarcoidosis- stable based on imaging at present/abnormal CT\par -12/2021 CT scan- returned to normal \par -continue yearly ophthalmological evaluations/PFTs\par -Sarcoidosis is a medical mystery and can present with very serious illness or little consequence. The disease can affect any organ including skin, liver, lymph glands, spleen, eyes, nervous system, musculoskeletal system, heart, brain, kidneys, and including the lungs. Pulmonary sarcoidosis can cause loss of lung volume and abnormal lung stiffness. This disease is characterized by presence of granulomas, small areas of inflamed cells. Sarcoidosis patients should have regular cardiac and eye examinations as well as regular PFTs.\par \par F/U in 3 months with spi with \par Pt to call me next week if there is not improvement or if he starts the steroids. \par He is encouraged to call with any changes, concerns, or questions

## 2022-03-08 ENCOUNTER — NON-APPOINTMENT (OUTPATIENT)
Age: 67
End: 2022-03-08

## 2022-03-15 ENCOUNTER — APPOINTMENT (OUTPATIENT)
Dept: CARDIOLOGY | Facility: CLINIC | Age: 67
End: 2022-03-15
Payer: MEDICARE

## 2022-03-15 ENCOUNTER — NON-APPOINTMENT (OUTPATIENT)
Age: 67
End: 2022-03-15

## 2022-03-15 VITALS
HEART RATE: 53 BPM | SYSTOLIC BLOOD PRESSURE: 124 MMHG | BODY MASS INDEX: 28.15 KG/M2 | WEIGHT: 164 LBS | DIASTOLIC BLOOD PRESSURE: 80 MMHG | OXYGEN SATURATION: 97 %

## 2022-03-15 PROCEDURE — 93000 ELECTROCARDIOGRAM COMPLETE: CPT

## 2022-03-15 PROCEDURE — 36415 COLL VENOUS BLD VENIPUNCTURE: CPT

## 2022-03-15 PROCEDURE — 99214 OFFICE O/P EST MOD 30 MIN: CPT

## 2022-03-15 NOTE — HISTORY OF PRESENT ILLNESS
[FreeTextEntry1] : Cramps in his forearms at night.\par Recent pulmonary eval and treatment for asthma.\par Concern about dyspnea too.\par \par Prior:\par Has been on tapering dose of prednisone for his sarcoid lung issues.\par Left sided chest, neck, shoulder pain on and off for the past two weeks.Tender and positional. Significant fatigue "to the bone". Poor sleeping too.\par Planning chest CT for sarcoid, as cough is returning.\par \par Prior:\par He has been undergoing treatment for active sarcoidosis that has affected his lungs.  His most prominent complaint is a dry nonproductive cough.\par His biggest concern for today's visit though is his severe coronary calcification reported on the CT scan of his chest.  He has no exertional chest pains or shortness of breath beyond the usual.\par \par \par Prior:\par "Not totally inactive"\par Taking Praluent as directed.\par Sinus issues requiring surgery planned.  \par Feels good overal.\par Nothing on loop recorder.\par Walks a fair amount.(5k+ per day) and feels good, no chest pains,  but muscle aches persist.\par \par \par \par

## 2022-03-15 NOTE — DISCUSSION/SUMMARY
[FreeTextEntry1] : 67 year old with with coronary disease and resolved active sarcoidosis, but with new respiratory issues.\par Doubt cardiac etiology.\par Lamictal can be safe. ECG to be done after 1 week.\par ECG unchanged, but slow now.\par His coronary disease does not appear to be the cause of his symptoms and I reassured him. \par Continue aggressive medical therapy including aspirin, statin and Praluent.\par Labs drawn to workup fatigue.\par nuclear Stress to r/o chronotropic incompetece and severe CAD.\par

## 2022-03-16 LAB
ALBUMIN SERPL ELPH-MCNC: 4.1 G/DL
ALP BLD-CCNC: 61 U/L
ALT SERPL-CCNC: 21 U/L
ANION GAP SERPL CALC-SCNC: 12 MMOL/L
AST SERPL-CCNC: 19 U/L
BASOPHILS # BLD AUTO: 0.05 K/UL
BASOPHILS NFR BLD AUTO: 0.8 %
BILIRUB SERPL-MCNC: 0.7 MG/DL
BUN SERPL-MCNC: 23 MG/DL
CALCIUM SERPL-MCNC: 9.3 MG/DL
CHLORIDE SERPL-SCNC: 103 MMOL/L
CHOLEST SERPL-MCNC: 110 MG/DL
CO2 SERPL-SCNC: 24 MMOL/L
CREAT SERPL-MCNC: 1.08 MG/DL
EGFR: 75 ML/MIN/1.73M2
EOSINOPHIL # BLD AUTO: 0.26 K/UL
EOSINOPHIL NFR BLD AUTO: 4 %
GLUCOSE SERPL-MCNC: 96 MG/DL
HCT VFR BLD CALC: 50.7 %
HDLC SERPL-MCNC: 67 MG/DL
HGB BLD-MCNC: 16.3 G/DL
IMM GRANULOCYTES NFR BLD AUTO: 0.3 %
LDLC SERPL CALC-MCNC: 32 MG/DL
LYMPHOCYTES # BLD AUTO: 0.88 K/UL
LYMPHOCYTES NFR BLD AUTO: 13.5 %
MAN DIFF?: NORMAL
MCHC RBC-ENTMCNC: 30.9 PG
MCHC RBC-ENTMCNC: 32.1 GM/DL
MCV RBC AUTO: 96.2 FL
MONOCYTES # BLD AUTO: 0.5 K/UL
MONOCYTES NFR BLD AUTO: 7.6 %
NEUTROPHILS # BLD AUTO: 4.83 K/UL
NEUTROPHILS NFR BLD AUTO: 73.8 %
NONHDLC SERPL-MCNC: 44 MG/DL
NT-PROBNP SERPL-MCNC: 165 PG/ML
PLATELET # BLD AUTO: 167 K/UL
POTASSIUM SERPL-SCNC: 5.2 MMOL/L
PROT SERPL-MCNC: 5.9 G/DL
RBC # BLD: 5.27 M/UL
RBC # FLD: 12.6 %
SODIUM SERPL-SCNC: 139 MMOL/L
TRIGL SERPL-MCNC: 57 MG/DL
TSH SERPL-ACNC: 1.57 UIU/ML
WBC # FLD AUTO: 6.54 K/UL

## 2022-03-24 ENCOUNTER — APPOINTMENT (OUTPATIENT)
Dept: CARDIOLOGY | Facility: CLINIC | Age: 67
End: 2022-03-24
Payer: MEDICARE

## 2022-03-24 PROCEDURE — A9500: CPT

## 2022-03-24 PROCEDURE — 78452 HT MUSCLE IMAGE SPECT MULT: CPT

## 2022-03-24 PROCEDURE — 93015 CV STRESS TEST SUPVJ I&R: CPT

## 2022-03-28 ENCOUNTER — NON-APPOINTMENT (OUTPATIENT)
Age: 67
End: 2022-03-28

## 2022-04-07 ENCOUNTER — NON-APPOINTMENT (OUTPATIENT)
Age: 67
End: 2022-04-07

## 2022-04-07 ENCOUNTER — APPOINTMENT (OUTPATIENT)
Dept: ELECTROPHYSIOLOGY | Facility: CLINIC | Age: 67
End: 2022-04-07
Payer: MEDICARE

## 2022-04-07 ENCOUNTER — RX RENEWAL (OUTPATIENT)
Age: 67
End: 2022-04-07

## 2022-04-07 PROCEDURE — G2066: CPT

## 2022-04-07 PROCEDURE — 93298 REM INTERROG DEV EVAL SCRMS: CPT

## 2022-05-11 ENCOUNTER — APPOINTMENT (OUTPATIENT)
Dept: ELECTROPHYSIOLOGY | Facility: CLINIC | Age: 67
End: 2022-05-11
Payer: MEDICARE

## 2022-05-11 ENCOUNTER — NON-APPOINTMENT (OUTPATIENT)
Age: 67
End: 2022-05-11

## 2022-05-11 PROCEDURE — 93298 REM INTERROG DEV EVAL SCRMS: CPT

## 2022-05-11 PROCEDURE — G2066: CPT

## 2022-05-19 ENCOUNTER — RX RENEWAL (OUTPATIENT)
Age: 67
End: 2022-05-19

## 2022-06-03 ENCOUNTER — RX RENEWAL (OUTPATIENT)
Age: 67
End: 2022-06-03

## 2022-06-03 ENCOUNTER — NON-APPOINTMENT (OUTPATIENT)
Age: 67
End: 2022-06-03

## 2022-06-03 ENCOUNTER — APPOINTMENT (OUTPATIENT)
Dept: PULMONOLOGY | Facility: CLINIC | Age: 67
End: 2022-06-03
Payer: MEDICARE

## 2022-06-03 VITALS
DIASTOLIC BLOOD PRESSURE: 70 MMHG | HEIGHT: 64 IN | RESPIRATION RATE: 16 BRPM | OXYGEN SATURATION: 98 % | BODY MASS INDEX: 28 KG/M2 | TEMPERATURE: 97.2 F | HEART RATE: 54 BPM | WEIGHT: 164 LBS | SYSTOLIC BLOOD PRESSURE: 110 MMHG

## 2022-06-03 DIAGNOSIS — J32.4 CHRONIC PANSINUSITIS: ICD-10-CM

## 2022-06-03 DIAGNOSIS — J30.2 OTHER SEASONAL ALLERGIC RHINITIS: ICD-10-CM

## 2022-06-03 PROCEDURE — 94010 BREATHING CAPACITY TEST: CPT

## 2022-06-03 PROCEDURE — 95012 NITRIC OXIDE EXP GAS DETER: CPT

## 2022-06-03 PROCEDURE — 99214 OFFICE O/P EST MOD 30 MIN: CPT | Mod: 25

## 2022-06-03 PROCEDURE — 94729 DIFFUSING CAPACITY: CPT

## 2022-06-03 PROCEDURE — 94727 GAS DIL/WSHOT DETER LNG VOL: CPT

## 2022-06-03 RX ORDER — PREDNISONE 10 MG/1
10 TABLET ORAL
Qty: 21 | Refills: 0 | Status: DISCONTINUED | COMMUNITY
Start: 2022-03-03 | End: 2022-06-03

## 2022-06-03 RX ORDER — PREDNISONE 20 MG/1
20 TABLET ORAL DAILY
Qty: 30 | Refills: 1 | Status: DISCONTINUED | COMMUNITY
Start: 2021-09-16 | End: 2022-06-03

## 2022-06-03 RX ORDER — PREDNISONE 1 MG/1
1 TABLET ORAL
Qty: 100 | Refills: 2 | Status: DISCONTINUED | COMMUNITY
Start: 2021-12-22 | End: 2022-06-03

## 2022-06-03 RX ORDER — PREDNISONE 10 MG/1
10 TABLET ORAL
Qty: 100 | Refills: 1 | Status: DISCONTINUED | COMMUNITY
Start: 2021-10-19 | End: 2022-06-03

## 2022-06-03 NOTE — PROCEDURE
[FreeTextEntry1] : FENO was 28 ; normal value being less than 25\par Fractional exhaled nitric oxide (FENO) is regarded as a simple, noninvasive method for assessing eosinophilic airway inflammation. Produced by a variety of cells within the lung, nitric oxide (NO) concentrations are generally low in healthy individuals. However, high concentrations of NO appear to be involved in nonspecific host defense mechanisms and chronic inflammatory diseases such as asthma. The American Thoracic Society (ATS) therefore has recommended using FENO to aid in the diagnosis and monitoring of eosinophilic airway inflammation and asthma, and for identifying steroid responsive individuals whose chronic respiratory symptoms may be airway inflammation. \par \par PFT reveals normal flows, with an FEV1 of   3.54L, which is  136% of predicted, with normal flow volume loop \par \par Full PFT revealed normal flows, with a FEV1 of 3.70L, which is 138% of predicted, normal lung volumes, and a diffusion of 25.5, which is 132% of predicted, with a normal flow volume loop\par \par CARDIAC MRI (JUN.29.2019) IMPRESSION: extensive multilevel cervical discogenic spondylosis does not appear changed in comparison to prior study. \par \par \par

## 2022-06-03 NOTE — ADDENDUM
[FreeTextEntry1] : Documented by Sushila Redmond acting as a scribe for Dr. Nazario Slaughter on 06/03/2022 \par \par All medical record entries made by the Scribe were at my, Dr. Nazario Slaughter's, direction and personally dictated by me on 06/03/2022 . I have reviewed the chart and agree that the record accurately reflects my personal performance of the history, physical exam, assessment and plan. I have also personally directed, reviewed, and agree with the discharge instructions.

## 2022-06-03 NOTE — PHYSICAL EXAM
[No Acute Distress] : no acute distress [Well Nourished] : well nourished [No Deformities] : no deformities [Well Developed] : well developed [Normal Oropharynx] : normal oropharynx [Normal Appearance] : normal appearance [No Neck Mass] : no neck mass [Normal Rate/Rhythm] : normal rate/rhythm [Normal S1, S2] : normal s1, s2 [No Murmurs] : no murmurs [No Resp Distress] : no resp distress [Clear to Auscultation Bilaterally] : clear to auscultation bilaterally [No Abnormalities] : no abnormalities [Benign] : benign [Normal Gait] : normal gait [No Clubbing] : no clubbing [No Cyanosis] : no cyanosis [No Edema] : no edema [FROM] : FROM [Normal Color/ Pigmentation] : normal color/ pigmentation [No Focal Deficits] : no focal deficits [Oriented x3] : oriented x3 [Normal Affect] : normal affect [II] : Mallampati Class: II [TextBox_68] : I:E 1:3, clear

## 2022-06-03 NOTE — REASON FOR VISIT
[Follow-Up] : a follow-up visit [FreeTextEntry1] : OSAS, sarcoidosis, asthma, allergy, chronic sinus Dz, OSAS, s/p syncope (loop recorder) - ?active sarcoidosis vs. infection 9/2021 (resolved)

## 2022-06-03 NOTE — HISTORY OF PRESENT ILLNESS
[FreeTextEntry1] : Mr. CORSS is a 67 year year old male with a history of cardiac disease, sarcoidosis, asthma, allergy, OSAS, s/p syncope (loop recorder) who presents today  for a follow-up pulmonary evaluation. His chief complaint is \par - he notes he has been feeling okay \par - he has been having active sinuses with the transition of seasons \par - he has been feeling congested and nasal congestion \par - he has been having trouble getting the mucous out \par - he notes his vision has been okay \par - he notes he walks about 8k steps a day \par - he has been walking in the denson he has been having sleep issues \par - he has been waking up every hour and a half at night \par - he has not been seeing anyone for his interstitial cystitis in awhile due to COVID \par - He has not been snoring at all \par - he feels he wheezes a bit at night in bed though usually he is unaware of it \par - he notes he coughs but he feels it may be due to his sinuses \par - he has been using a saline pray nasal solution for his sinuses \par - He  denies any visual issues, headaches, nausea, vomiting, fever, chills, sweats, chest pains, chest pressure, diarrhea, constipation, dysphagia, myalgia, dizziness, leg swelling, leg pain, itchy eyes, itchy ears, heartburn, reflux, or sour taste in the mouth.

## 2022-06-03 NOTE — ASSESSMENT
[FreeTextEntry1] : Mr. Feliz is a 67 year old male with a history of cardiac disease, sarcoidosis, asthma, allergy, OSAS, s/p syncope (loop recorder) presenting to the office today. Currently c/w inactive sarcoidosis improved - with steroids; #1 issue is sinus (still)\par \par His shortness of breath is felt to be multifactorial due to: \par -chest pain syndrome being costochondritis \par -cardiac disease\par -poor breathing mechanics\par -out of shape\par -asthma (stable)\par -sarcoidosis\par \par problem 1: asthma- (controlled)\par -Continue Symbicort 160 2 inhalations BID \par -continue Singulair 10 mg before bed\par -continue Spiriva 2 puffs QD \par -Asthma is  believed to be caused by inherited (genetic) and environmental factor, but its exact cause is unknown. Asthma may be triggered by allergens, lung infections, or irritants in the air. Asthma triggers are different for each person\par -Inhaler technique reviewed as well as oral hygiene techniques reviewed with patient. Avoidance of cold air, extremes of temperature, rescue inhaler should be used before exercise. Order of medication reviewed with patient. Recommended use of a cool mist humidifier in the bedroom.\par \par problem 2: allergic rhinitis/sinusitis (NONCOMPLIANT) -Active#1 \par -On Dupixent 300 [id number] - since 2020 \par -Continue Clarinex 5mg QHS \par -he is being recommended to use nasal saline - Navage "Ponaris"\par - Qnasl 1 sniff BID or Xhance 1 sniff BID (Budesonide .5 BID)\par -continue Olopatadine .6 sniff each nostril BID\par \par Environmental measures for allergies were encouraged including mattress and pillow cover, air purifier, and environmental controls. \par \par problem 3: GERD\par -continue Pepcid 40 mg QHS\par -Rule of 2's- Avoid eating too much, too late, too poorly, too spicy, or two hours before bed \par -Things to avoid including overeating, spicy foods, tight clothing, eating within three hours of bed, this list is not all inclusive. \par -For treatment of reflux, possible options discussed including diet control, H2 blockers, PPIs, as well as coating motility agents discussed as treatment options. Timing of meals and proximity of last meal to sleep were discussed. If symptoms persist, a formal gastrointestinal evaluation is needed.\par \par problem 4: NIKKY\par -he is being recommended an oral appliance and highly encourage to follow up with Dr. Nawaf Hoang; Bryn\par -Discussed the risks/associations with coronary artery disease, atrial fibrillation, arrhythmia, memory loss, issues with concentration, stroke risk, hypertension, nocturia, chronic reflux/Hassan’s esophagus some but not all inclusive. Treatment options discussed including CPAP/BiPAP machine, oral appliance, ProVent therapy, Oxy-Aid by [employer], new technologies, or positional sleep. \par \par problem 5: sarcoidosis (?active) vs. ?infection vs. BOOP - improved (2nd opinion Dr. Christen Gallegos [facility])- (quiet)\par -s/p Prednisone 20mg QDay x 1 month then reevaluate - alternate 20 mg even/15 mg odd - move to 5mg qd x2weeks then reduce by 1mg every 2weeks to off \par -Information sheet given to the patient to be reviewed, this medication is never to be used without consulting the prescribing physician. Proper dietary restraint is necessary specifically salt containing foods, if any reaction may occur should be reported. \par \par -s/p Biaxin 500 mg BID x 2 weeks\par - s/p CXR in 3/2022 - [facility] \par -s/p CT in 2 months - 11/15/2021- returned to normal \par -continue yearly ophthalmological evaluations/PFTs\par -Sarcoidosis is a medical mystery and can present with very serious illness or little consequence. The disease can affect any organ including skin, liver, lymph glands, spleen, eyes, nervous system, musculoskeletal system, heart, brain, kidneys, and including the lungs. Pulmonary sarcoidosis can cause loss of lung volume and abnormal lung stiffness. This disease is characterized by presence of granulomas, small areas of inflamed cells. Sarcoidosis patients should have regular cardiac and eye examinations as well as regular PFTs.\par \par Problem 5A:\par -continue Neurontin 100 qHS (no interest)\par \par problem 6: cardiac disease\par -continue to follow up with the cardiologist Dr. Lisker \par -s/p cardiac MRI 2019\par \par problem 7: overweight\par -Weight loss, exercise, and diet control were discussed and are highly encouraged. Treatment options were given such as, aqua therapy, and contacting a nutritionist. Recommended to use the elliptical, stationary bike, less use of treadmill.  Obesity is associated with worsening asthma, shortness of breath, and potential for cardiac disease, diabetes, and other underlying medical conditions. \par \par Problem 8: Costochondritis (Quiet)\par -recommended Topricin cream\par -recommend P6 cream\par \par Problem 9: Health Maintenance/COVID19 Precautions:\par - S/p Covid 19 vaccine (Moderna) x4\par -Covid 19 vaccine discussed at length with patient; booster discussed and recommended to wait for vaccine containing new delta variant \par - Clean your hands often. Wash your hands often with soap and water for at least 20 seconds, especially after blowing your nose, coughing, or sneezing, or having been in a public place.\par - If soap and water are not available, use a hand  that contains at least 60% alcohol.\par - To the extent possible, avoid touching high-touch surfaces in public places - elevator buttons, door handles, handrails, handshaking with people, etc. Use a tissue or your sleeve to cover your hand or finger if you must touch something.\par - Wash your hands after touching surfaces in public places.\par - Avoid touching your face, nose, eyes, etc.\par - Clean and disinfect your home to remove germs: practice routine cleaning of frequently touched surfaces (for example: tables, doorknobs, light switches, handles, desks, toilets, faucets, sinks & cell phones)\par - Avoid crowds, especially in poorly ventilated spaces. Your risk of exposure to respiratory viruses like COVID-19 may increase in crowded, closed-in settings with little air circulation if\par there are people in the crowd who are sick. All patients are recommended to practice social distancing and stay at least 6 feet away from others.\par Immune Support Recommendations:\par \par -OTC Vitamin C 500mg BID \par -OTC Quercetin 250-500mg BID \par -OTC Zinc 75-100mg per day \par -OTC Melatonin 1or 2mg a night \par -OTC Vitamin D 1-4000mg per day \par \par \par \par Problem 10: health maintenance\par -continue Urology f/u\par -recommended a yearly flu shot after October 15 (11.22.19)\par -recommended strep pneumonia vaccines: Prevnar-13 vaccine, followed by Pneumo vaccine 23 on year following\par -recommended early intervention for URIs\par -recommended osteoporosis evaluations\par -recommended early dermatological evaluations\par -recommended after the age of 50 to the age of 70, colonoscopy every 5 years\par -encouraged early intervention\par \par F/U in 4 months with spi\par He is encouraged to call with any changes, concerns, or questions

## 2022-06-16 ENCOUNTER — APPOINTMENT (OUTPATIENT)
Dept: ELECTROPHYSIOLOGY | Facility: CLINIC | Age: 67
End: 2022-06-16
Payer: MEDICARE

## 2022-06-16 ENCOUNTER — NON-APPOINTMENT (OUTPATIENT)
Age: 67
End: 2022-06-16

## 2022-06-16 PROCEDURE — 93298 REM INTERROG DEV EVAL SCRMS: CPT

## 2022-06-16 PROCEDURE — G2066: CPT

## 2022-07-13 ENCOUNTER — APPOINTMENT (OUTPATIENT)
Dept: CARDIOLOGY | Facility: CLINIC | Age: 67
End: 2022-07-13

## 2022-07-17 ENCOUNTER — NON-APPOINTMENT (OUTPATIENT)
Age: 67
End: 2022-07-17

## 2022-07-18 ENCOUNTER — APPOINTMENT (OUTPATIENT)
Dept: ELECTROPHYSIOLOGY | Facility: CLINIC | Age: 67
End: 2022-07-18

## 2022-07-18 PROCEDURE — 93298 REM INTERROG DEV EVAL SCRMS: CPT

## 2022-07-18 PROCEDURE — G2066: CPT

## 2022-07-26 ENCOUNTER — RX RENEWAL (OUTPATIENT)
Age: 67
End: 2022-07-26

## 2022-07-26 RX ORDER — ALFUZOSIN HYDROCHLORIDE 10 MG/1
10 TABLET, EXTENDED RELEASE ORAL
Qty: 180 | Refills: 2 | Status: ACTIVE | COMMUNITY
Start: 2018-09-14 | End: 1900-01-01

## 2022-08-17 NOTE — REVIEW OF SYSTEMS
----- Message from Heather Helms sent at 5/2/2018  2:51 PM CDT -----  Contact: [phone] grandmother argelia franklin   Patient is requesting a call back from the nurse, want to know when she can shower or take the bandage off.    [phone] grandmother argelia franklin    Echo scheduled 8/24/22.     Electronically signed by Precious Frederick on 8/17/2022 at 2:50 PM [Nasal Congestion] : nasal congestion [Dry Mouth] : dry mouth [Sinus Problems] : sinus problems [GERD] : gerd [Anxiety] : anxiety [Negative] : Hematologic [Chills] : no chills [Fatigue] : no fatigue [Fever] : no fever [Ear Disturbance] : no ear disturbance [Poor Appetite] : no poor appetite [Sore Throat] : no sore throat [Postnasal Drip] : no postnasal drip [Cough] : no cough [Chest Tightness] : no chest tightness [Wheezing] : no wheezing [Dyspnea] : no dyspnea [Sputum] : no sputum [Dizziness] : no dizziness [SOB on Exertion] : no sob on exertion [TextBox_30] : mild breathiness when around guinea pigs [TextBox_134] : on ativan and valium

## 2022-08-22 ENCOUNTER — APPOINTMENT (OUTPATIENT)
Dept: ELECTROPHYSIOLOGY | Facility: CLINIC | Age: 67
End: 2022-08-22

## 2022-08-22 ENCOUNTER — NON-APPOINTMENT (OUTPATIENT)
Age: 67
End: 2022-08-22

## 2022-08-22 PROCEDURE — G2066: CPT

## 2022-08-22 PROCEDURE — 93298 REM INTERROG DEV EVAL SCRMS: CPT

## 2022-09-26 ENCOUNTER — NON-APPOINTMENT (OUTPATIENT)
Age: 67
End: 2022-09-26

## 2022-09-26 ENCOUNTER — APPOINTMENT (OUTPATIENT)
Dept: ELECTROPHYSIOLOGY | Facility: CLINIC | Age: 67
End: 2022-09-26

## 2022-09-26 PROCEDURE — 93298 REM INTERROG DEV EVAL SCRMS: CPT

## 2022-09-26 PROCEDURE — G2066: CPT

## 2022-09-29 ENCOUNTER — RX RENEWAL (OUTPATIENT)
Age: 67
End: 2022-09-29

## 2022-10-06 ENCOUNTER — NON-APPOINTMENT (OUTPATIENT)
Age: 67
End: 2022-10-06

## 2022-10-11 ENCOUNTER — APPOINTMENT (OUTPATIENT)
Dept: PULMONOLOGY | Facility: CLINIC | Age: 67
End: 2022-10-11

## 2022-10-11 ENCOUNTER — NON-APPOINTMENT (OUTPATIENT)
Age: 67
End: 2022-10-11

## 2022-10-11 VITALS
RESPIRATION RATE: 16 BRPM | OXYGEN SATURATION: 95 % | HEART RATE: 64 BPM | WEIGHT: 165 LBS | TEMPERATURE: 97.3 F | DIASTOLIC BLOOD PRESSURE: 62 MMHG | BODY MASS INDEX: 26.52 KG/M2 | SYSTOLIC BLOOD PRESSURE: 110 MMHG | HEIGHT: 66 IN

## 2022-10-11 PROCEDURE — 94010 BREATHING CAPACITY TEST: CPT

## 2022-10-11 PROCEDURE — 99214 OFFICE O/P EST MOD 30 MIN: CPT | Mod: 25

## 2022-10-11 PROCEDURE — ZZZZZ: CPT

## 2022-10-11 PROCEDURE — 95012 NITRIC OXIDE EXP GAS DETER: CPT

## 2022-10-11 NOTE — ASSESSMENT
[FreeTextEntry1] : Mr. Feliz is a 67 year old male with a history of cardiac disease, sarcoidosis, asthma, allergy, OSAS, s/p syncope (loop recorder) presenting to the office today. Currently c/w inactive sarcoidosis improved - with steroids; #1 issue is sleep (tear)\par \par His shortness of breath is felt to be multifactorial due to: \par -chest pain syndrome being costochondritis \par -cardiac disease\par -poor breathing mechanics\par -out of shape\par -asthma (stable)\par -sarcoidosis\par \par problem 1: asthma- (controlled)\par -Continue Symbicort 160 2 inhalations BID \par -continue Singulair 10 mg before bed\par -continue Spiriva 2 puffs QD \par -Asthma is  believed to be caused by inherited (genetic) and environmental factor, but its exact cause is unknown. Asthma may be triggered by allergens, lung infections, or irritants in the air. Asthma triggers are different for each person\par -Inhaler technique reviewed as well as oral hygiene techniques reviewed with patient. Avoidance of cold air, extremes of temperature, rescue inhaler should be used before exercise. Order of medication reviewed with patient. Recommended use of a cool mist humidifier in the bedroom.\par \par problem 2: allergic rhinitis/sinusitis (NONCOMPLIANT) -Active#1 \par -On Dupixent 300 h2Smzqi - since 2020 \par -Continue Clarinex 5mg QHS \par -he is being recommended to use nasal saline - Navage "Ponaris"\par - Qnasl 1 sniff BID or Xhance 1 sniff BID (Budesonide .5 BID)\par -continue Olopatadine .6 sniff each nostril BID\par \par Environmental measures for allergies were encouraged including mattress and pillow cover, air purifier, and environmental controls. \par \par problem 3: GERD\par -continue Pepcid 40 mg QHS\par -Rule of 2's- Avoid eating too much, too late, too poorly, too spicy, or two hours before bed \par -Things to avoid including overeating, spicy foods, tight clothing, eating within three hours of bed, this list is not all inclusive. \par -For treatment of reflux, possible options discussed including diet control, H2 blockers, PPIs, as well as coating motility agents discussed as treatment options. Timing of meals and proximity of last meal to sleep were discussed. If symptoms persist, a formal gastrointestinal evaluation is needed.\par \par problem 4: NIKKY\par -Epsom salt bath before bed\par -add MyoCalm PM\par -he is being recommended an oral appliance and highly encourage to follow up with Dr. Nawaf Hoang; [place]inst[place]\par -Discussed the risks/associations with coronary artery disease, atrial fibrillation, arrhythmia, memory loss, issues with concentration, stroke risk, hypertension, nocturia, chronic reflux/Hassan’s esophagus some but not all inclusive. Treatment options discussed including CPAP/BiPAP machine, oral appliance, ProVent therapy, Oxy-Aid by [employer]pite[employer], new technologies, or positional sleep. \par \par problem 5: sarcoidosis (?active) vs. ?infection vs. BOOP - improved (2nd opinion Dr. Christen Gallegos [facility])- (quiet)\par -s/p Prednisone 20mg QDay x 1 month then reevaluate - alternate 20 mg even/15 mg odd - move to 5mg qd x2weeks then reduce by 1mg every 2weeks to off \par -Information sheet given to the patient to be reviewed, this medication is never to be used without consulting the prescribing physician. Proper dietary restraint is necessary specifically salt containing foods, if any reaction may occur should be reported. \par \par -s/p Biaxin 500 mg BID x 2 weeks\par - s/p CXR in 3/2022 - [facility]SS[facility] \par -s/p CT in 2 months - 11/15/2021- returned to normal \par -continue yearly ophthalmological evaluations/PFTs\par -Sarcoidosis is a medical mystery and can present with very serious illness or little consequence. The disease can affect any organ including skin, liver, lymph glands, spleen, eyes, nervous system, musculoskeletal system, heart, brain, kidneys, and including the lungs. Pulmonary sarcoidosis can cause loss of lung volume and abnormal lung stiffness. This disease is characterized by presence of granulomas, small areas of inflamed cells. Sarcoidosis patients should have regular cardiac and eye examinations as well as regular PFTs.\par \par Problem 5A:\par -continue Neurontin 100 qHS (no interest)\par \par problem 6: cardiac disease\par -continue to follow up with the cardiologist Dr. Lisker \par -s/p cardiac MRI 2019\par \par problem 7: overweight\par -Recommended Ander Duarte's 10-day detox diet and book. \par -Weight loss, exercise, and diet control were discussed and are highly encouraged. Treatment options were given such as, aqua therapy, and contacting a nutritionist. Recommended to use the elliptical, stationary bike, less use of treadmill.  Obesity is associated with worsening asthma, shortness of breath, and potential for cardiac disease, diabetes, and other underlying medical conditions. \par \par Problem 8: Costochondritis (Quiet)\par -add SPM/quercitin\par -recommended Topricin cream\par -recommend P6 cream\par \par Problem 9: Health Maintenance/COVID19 Precautions:\par -recommended Sanotize anti viral nasal spray in case of viral infection \par - S/p Covid 19 vaccine (Moderna) x5\par -Covid 19 vaccine discussed at length with patient; booster discussed and recommended to wait for vaccine containing new delta variant \par - Clean your hands often. Wash your hands often with soap and water for at least 20 seconds, especially after blowing your nose, coughing, or sneezing, or having been in a public place.\par - If soap and water are not available, use a hand  that contains at least 60% alcohol.\par - To the extent possible, avoid touching high-touch surfaces in public places - elevator buttons, door handles, handrails, handshaking with people, etc. Use a tissue or your sleeve to cover your hand or finger if you must touch something.\par - Wash your hands after touching surfaces in public places.\par - Avoid touching your face, nose, eyes, etc.\par - Clean and disinfect your home to remove germs: practice routine cleaning of frequently touched surfaces (for example: tables, doorknobs, light switches, handles, desks, toilets, faucets, sinks & cell phones)\par - Avoid crowds, especially in poorly ventilated spaces. Your risk of exposure to respiratory viruses like COVID-19 may increase in crowded, closed-in settings with little air circulation if\par there are people in the crowd who are sick. All patients are recommended to practice social distancing and stay at least 6 feet away from others.\par Immune Support Recommendations:\par \par -OTC Vitamin C 500mg BID \par -OTC Quercetin 250-500mg BID \par -OTC Zinc 75-100mg per day \par -OTC Melatonin 1or 2mg a night \par -OTC Vitamin D 1-4000mg per day \par \par Problem 10: health maintenance\par -continue Urology f/u\par -recommended a yearly flu shot after October 15 (2021)\par -recommended strep pneumonia vaccines: Prevnar-13 vaccine, followed by Pneumo vaccine 23 on year following (completed)\par -recommended early intervention for URIs\par -recommended osteoporosis evaluations\par -recommended early dermatological evaluations\par -recommended after the age of 50 to the age of 70, colonoscopy every 5 years\par -encouraged early intervention\par \par F/U in 4 months with spi\par He is encouraged to call with any changes, concerns, or questions

## 2022-10-11 NOTE — ADDENDUM
[FreeTextEntry1] : Documented by Roberto Carlos Her acting as a scribe for Dr. Nazario Slaughter on 10/11/2022.\par \par All medical record entries made by the Scribe were at my, Dr. Nazario Slaughter's, direction and personally dictated by me on 10/11/2022. I have reviewed the chart and agree that the record accurately reflects my personal performance of the history, physical exam, assessment and plan. I have also personally directed, reviewed, and agree with the discharge instructions.

## 2022-10-11 NOTE — PROCEDURE
[FreeTextEntry1] : PFT revealed normal flows, with a FEV1 of 3.66L, which is 129% of predicted, with a normal flow volume loop\par \par Feno was 7; a normal value being less than 25. Fractional exhaled nitric oxide (FENO) is regarded as a simple, noninvasive method for assessing eosinophilic airway inflammation. Produced by a variety of cells within the lung, nitric oxide (NO) concentrations are generally low in healthy individuals. However, high concentrations of NO appear to be involved in nonspecific host defense mechanisms and chronic inflammatory  diseases such as asthma. The American Thoracic Society (ATS) therefore recommended using FENO to aid in the diagnosis and monitoring of eosinophilic airway inflammation and asthma, and for identifying steroid responsive individuals whose chronic respiratory symptoms may be caused by airway inflammation

## 2022-10-11 NOTE — PHYSICAL EXAM
[No Acute Distress] : no acute distress [Well Nourished] : well nourished [No Deformities] : no deformities [Well Developed] : well developed [Normal Oropharynx] : normal oropharynx [II] : Mallampati Class: II [Normal Appearance] : normal appearance [No Neck Mass] : no neck mass [Normal Rate/Rhythm] : normal rate/rhythm [Normal S1, S2] : normal s1, s2 [No Murmurs] : no murmurs [No Resp Distress] : no resp distress [Clear to Auscultation Bilaterally] : clear to auscultation bilaterally [No Abnormalities] : no abnormalities [Benign] : benign [Normal Gait] : normal gait [No Clubbing] : no clubbing [No Cyanosis] : no cyanosis [No Edema] : no edema [FROM] : FROM [Normal Color/ Pigmentation] : normal color/ pigmentation [No Focal Deficits] : no focal deficits [Oriented x3] : oriented x3 [Normal Affect] : normal affect [TextBox_68] : I:E 1:3, clear

## 2022-10-11 NOTE — HISTORY OF PRESENT ILLNESS
[FreeTextEntry1] : Mr. CROSS is a 67 year year old male with a history of cardiac disease, sarcoidosis, asthma, allergy, OSAS, s/p syncope (loop recorder) who presents today  for a follow-up pulmonary evaluation. His chief complaint is \par -he notes that he is sleeping well\par -he notes constipation (not severe)\par -he notes exercising (walking almost every day)\par -he notes he wakes during the night for several hours\par -he notes HA intermittently due to sinus congestion\par -he notes taking a high dose of statins\par -he notes that his energy levels are low\par -he notes being fatigued due to poor sleep\par -he notes orthopedic issues (arthritic elbow, cervical and lumbar pain)\par -s/p covid 19 vaccine x5\par -he notes muscle cramps and spasms at night\par -he notes his weight is stable \par -he notes that his diet is good\par \par -patient denies any nausea, vomiting, fever, chills, sweats, chest pain, chest pressure, palpitations, coughing, wheezing, diarrhea, dysphagia, myalgias, dizziness, leg swelling, itchy eyes, itchy ears, heartburn, reflux or sour taste in the mouth

## 2022-10-15 ENCOUNTER — EMERGENCY (EMERGENCY)
Facility: HOSPITAL | Age: 67
LOS: 1 days | Discharge: HOME | End: 2022-10-15
Attending: STUDENT IN AN ORGANIZED HEALTH CARE EDUCATION/TRAINING PROGRAM
Payer: MEDICARE

## 2022-10-15 VITALS
SYSTOLIC BLOOD PRESSURE: 112 MMHG | WEIGHT: 164.91 LBS | HEIGHT: 66 IN | DIASTOLIC BLOOD PRESSURE: 70 MMHG | HEART RATE: 83 BPM | RESPIRATION RATE: 18 BRPM | TEMPERATURE: 98 F | OXYGEN SATURATION: 95 %

## 2022-10-15 DIAGNOSIS — Z98.89 OTHER SPECIFIED POSTPROCEDURAL STATES: Chronic | ICD-10-CM

## 2022-10-15 PROCEDURE — 73562 X-RAY EXAM OF KNEE 3: CPT

## 2022-10-15 PROCEDURE — 73610 X-RAY EXAM OF ANKLE: CPT

## 2022-10-15 PROCEDURE — 93970 EXTREMITY STUDY: CPT | Mod: 26

## 2022-10-15 PROCEDURE — 73590 X-RAY EXAM OF LOWER LEG: CPT

## 2022-10-15 PROCEDURE — 99284 EMERGENCY DEPT VISIT MOD MDM: CPT | Mod: FS

## 2022-10-15 PROCEDURE — 99284 EMERGENCY DEPT VISIT MOD MDM: CPT | Mod: 25

## 2022-10-15 PROCEDURE — 73590 X-RAY EXAM OF LOWER LEG: CPT | Mod: 26,LT

## 2022-10-15 PROCEDURE — 73610 X-RAY EXAM OF ANKLE: CPT | Mod: 26,LT

## 2022-10-15 PROCEDURE — 73562 X-RAY EXAM OF KNEE 3: CPT | Mod: 26,LT

## 2022-10-15 PROCEDURE — 93970 EXTREMITY STUDY: CPT

## 2022-10-15 RX ORDER — ACETAMINOPHEN 500 MG
975 TABLET ORAL ONCE
Refills: 0 | Status: COMPLETED | OUTPATIENT
Start: 2022-10-15 | End: 2022-10-15

## 2022-10-15 RX ORDER — IBUPROFEN 200 MG
600 TABLET ORAL ONCE
Refills: 0 | Status: COMPLETED | OUTPATIENT
Start: 2022-10-15 | End: 2022-10-15

## 2022-10-15 RX ADMIN — Medication 600 MILLIGRAM(S): at 12:44

## 2022-10-15 RX ADMIN — Medication 975 MILLIGRAM(S): at 12:45

## 2022-10-15 NOTE — ED PROVIDER NOTE - NS ED ATTENDING STATEMENT MOD
This was a shared visit with the YEHUDA. I reviewed and verified the documentation and independently performed the documented:

## 2022-10-15 NOTE — ED PROVIDER NOTE - ATTENDING APP SHARED VISIT CONTRIBUTION OF CARE
I, Antelmo Price, performed a history and physical exam of the patient and discussed their management with the resident and /or advanced care provider. I reviewed the resident and /or ACP's note and agree with the documented findings and plan of care. I was present and available for all procedures.
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
## 2022-10-15 NOTE — ED PROVIDER NOTE - CARE PROVIDER_API CALL
Claudio Terrazas)  Orthopaedic Surgery  [address]  [place] [address]  Phone: [phone]  Fax: [phone]  Follow Up Time:

## 2022-10-15 NOTE — ED PROVIDER NOTE - NSFOLLOWUPINSTRUCTIONS_ED_ALL_ED_FT
Please see the information of muscle strain and crutches instruction.    Elevate the affected leg.    Use crutches as instructed for a comfort.     Ice to swelling area every 2hours for 20minutes.    Keep continue your current medications as prescribed.    Take Tylenol (2tablets of 500mg every 6-8hours) as needed for pain.    Follow up with sports medicine or orthopedist Dr. Terrazas, call Monday for appointment.    Return for any concerns, fever, numbness, weakness, or worsening pain.
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
## 2022-10-15 NOTE — ED PROVIDER NOTE - PATIENT PORTAL LINK FT
You can access the FollowMyHealth Patient Portal offered by [facility] by registering at the following website: [email or web address]. By joining [employer]’s FollowMyHealth portal, you will also be able to view your health information using other applications (apps) compatible with our system.

## 2022-10-15 NOTE — ED PROVIDER NOTE - NSFOLLOWUPCLINICS_GEN_ALL_ED_FT
[facility]  Sports Medicine  [address] [place] [address]  Phone: [phone]  Fax:

## 2022-10-15 NOTE — ED PROVIDER NOTE - CARE PLAN
Principal Discharge DX:	Gastrocnemius strain, left  Secondary Diagnosis:	Strain of right hamstring   1
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
## 2022-10-15 NOTE — ED PROVIDER NOTE - PHYSICAL EXAMINATION
NAD. VSS. Afebrile. Lungs clear. No spinal tender. ABD soft, non tender. +Ecchymosis/swelling to left gastro and right hamstring with tender. No achilles tender. N/V- intact.

## 2022-10-27 ENCOUNTER — APPOINTMENT (OUTPATIENT)
Dept: CARDIOLOGY | Facility: CLINIC | Age: 67
End: 2022-10-27

## 2022-10-31 ENCOUNTER — APPOINTMENT (OUTPATIENT)
Dept: ELECTROPHYSIOLOGY | Facility: CLINIC | Age: 67
End: 2022-10-31

## 2022-11-08 ENCOUNTER — NON-APPOINTMENT (OUTPATIENT)
Age: 67
End: 2022-11-08

## 2022-11-08 ENCOUNTER — APPOINTMENT (OUTPATIENT)
Dept: CARDIOLOGY | Facility: CLINIC | Age: 67
End: 2022-11-08

## 2022-11-08 VITALS
DIASTOLIC BLOOD PRESSURE: 76 MMHG | SYSTOLIC BLOOD PRESSURE: 102 MMHG | HEART RATE: 63 BPM | BODY MASS INDEX: 25.99 KG/M2 | WEIGHT: 161 LBS | OXYGEN SATURATION: 96 %

## 2022-11-08 DIAGNOSIS — R00.2 PALPITATIONS: ICD-10-CM

## 2022-11-08 PROCEDURE — 99214 OFFICE O/P EST MOD 30 MIN: CPT

## 2022-11-08 PROCEDURE — 93000 ELECTROCARDIOGRAM COMPLETE: CPT

## 2022-11-08 NOTE — HISTORY OF PRESENT ILLNESS
[FreeTextEntry1] : Left leg muscle tear. \par Vaccinated 5 times. \par \par \par \par Prior:\par Cramps in his forearms at night.\par Recent pulmonary eval and treatment for asthma.\par Concern about dyspnea too.\par \par Prior:\par Has been on tapering dose of prednisone for his sarcoid lung issues.\par Left sided chest, neck, shoulder pain on and off for the past two weeks.Tender and positional. Significant fatigue "to the bone". Poor sleeping too.\par Planning chest CT for sarcoid, as cough is returning.\par \par Prior:\par He has been undergoing treatment for active sarcoidosis that has affected his lungs.  His most prominent complaint is a dry nonproductive cough.\par His biggest concern for today's visit though is his severe coronary calcification reported on the CT scan of his chest.  He has no exertional chest pains or shortness of breath beyond the usual.\par \par \par Prior:\par "Not totally inactive"\par Taking Praluent as directed.\par Sinus issues requiring surgery planned.  \par Feels good overal.\par Nothing on loop recorder.\par Walks a fair amount.(5k+ per day) and feels good, no chest pains,  but muscle aches persist.\par \par \par \par

## 2022-11-08 NOTE — DISCUSSION/SUMMARY
[FreeTextEntry1] : 67 year old with with coronary disease and resolved active sarcoidosis.\par ECG unchanged.\par His coronary disease is stable. No angina. Continue aggressive medical therapy including aspirin, statin and Praluent.\par LDL was at goal.\par Labs drawn.  \par   [EKG obtained to assist in diagnosis and management of assessed problem(s)] : EKG obtained to assist in diagnosis and management of assessed problem(s)

## 2022-11-11 ENCOUNTER — RX RENEWAL (OUTPATIENT)
Age: 67
End: 2022-11-11

## 2022-11-18 LAB
ALBUMIN SERPL ELPH-MCNC: 4.8 G/DL
ALP BLD-CCNC: 78 U/L
ALT SERPL-CCNC: 23 U/L
ANION GAP SERPL CALC-SCNC: 14 MMOL/L
AST SERPL-CCNC: 21 U/L
BASOPHILS # BLD AUTO: 0.04 K/UL
BASOPHILS NFR BLD AUTO: 0.7 %
BILIRUB SERPL-MCNC: 0.6 MG/DL
BUN SERPL-MCNC: 21 MG/DL
CALCIUM SERPL-MCNC: 9.5 MG/DL
CHLORIDE SERPL-SCNC: 102 MMOL/L
CHOLEST SERPL-MCNC: 116 MG/DL
CO2 SERPL-SCNC: 24 MMOL/L
CREAT SERPL-MCNC: 0.87 MG/DL
EGFR: 94 ML/MIN/1.73M2
EOSINOPHIL # BLD AUTO: 0.23 K/UL
EOSINOPHIL NFR BLD AUTO: 4.2 %
ESTIMATED AVERAGE GLUCOSE: 114 MG/DL
GLUCOSE SERPL-MCNC: 112 MG/DL
HBA1C MFR BLD HPLC: 5.6 %
HCT VFR BLD CALC: 49.7 %
HDLC SERPL-MCNC: 63 MG/DL
HGB BLD-MCNC: 16.2 G/DL
IMM GRANULOCYTES NFR BLD AUTO: 0.2 %
LDLC SERPL CALC-MCNC: 38 MG/DL
LYMPHOCYTES # BLD AUTO: 0.97 K/UL
LYMPHOCYTES NFR BLD AUTO: 17.9 %
MAN DIFF?: NORMAL
MCHC RBC-ENTMCNC: 30.7 PG
MCHC RBC-ENTMCNC: 32.6 GM/DL
MCV RBC AUTO: 94.1 FL
MONOCYTES # BLD AUTO: 0.45 K/UL
MONOCYTES NFR BLD AUTO: 8.3 %
NEUTROPHILS # BLD AUTO: 3.73 K/UL
NEUTROPHILS NFR BLD AUTO: 68.7 %
NONHDLC SERPL-MCNC: 53 MG/DL
PLATELET # BLD AUTO: 243 K/UL
POTASSIUM SERPL-SCNC: 4.9 MMOL/L
PROT SERPL-MCNC: 6.4 G/DL
RBC # BLD: 5.28 M/UL
RBC # FLD: 13.6 %
SODIUM SERPL-SCNC: 140 MMOL/L
TRIGL SERPL-MCNC: 74 MG/DL
TSH SERPL-ACNC: 2.31 UIU/ML
WBC # FLD AUTO: 5.43 K/UL

## 2022-11-22 ENCOUNTER — RX RENEWAL (OUTPATIENT)
Age: 67
End: 2022-11-22

## 2023-01-10 ENCOUNTER — APPOINTMENT (OUTPATIENT)
Dept: PULMONOLOGY | Facility: CLINIC | Age: 68
End: 2023-01-10

## 2023-01-12 ENCOUNTER — RX RENEWAL (OUTPATIENT)
Age: 68
End: 2023-01-12

## 2023-01-17 ENCOUNTER — APPOINTMENT (OUTPATIENT)
Dept: PULMONOLOGY | Facility: CLINIC | Age: 68
End: 2023-01-17
Payer: MEDICARE

## 2023-01-17 DIAGNOSIS — J34.89 OTHER SPECIFIED DISORDERS OF NOSE AND NASAL SINUSES: ICD-10-CM

## 2023-01-17 PROCEDURE — 99213 OFFICE O/P EST LOW 20 MIN: CPT | Mod: 95

## 2023-01-23 ENCOUNTER — NON-APPOINTMENT (OUTPATIENT)
Age: 68
End: 2023-01-23

## 2023-01-23 ENCOUNTER — APPOINTMENT (OUTPATIENT)
Dept: INTERNAL MEDICINE | Facility: CLINIC | Age: 68
End: 2023-01-23
Payer: MEDICARE

## 2023-01-23 VITALS
DIASTOLIC BLOOD PRESSURE: 83 MMHG | BODY MASS INDEX: 25.55 KG/M2 | HEIGHT: 66 IN | WEIGHT: 159 LBS | HEART RATE: 54 BPM | SYSTOLIC BLOOD PRESSURE: 156 MMHG

## 2023-01-23 DIAGNOSIS — R51.9 HEADACHE, UNSPECIFIED: ICD-10-CM

## 2023-01-23 DIAGNOSIS — Z00.00 ENCOUNTER FOR GENERAL ADULT MEDICAL EXAMINATION W/OUT ABNORMAL FINDINGS: ICD-10-CM

## 2023-01-23 PROCEDURE — G0439: CPT | Mod: CS

## 2023-01-23 PROCEDURE — 93000 ELECTROCARDIOGRAM COMPLETE: CPT | Mod: 59

## 2023-01-23 RX ORDER — NIRMATRELVIR AND RITONAVIR 300-100 MG
20 X 150 MG & KIT ORAL
Qty: 1 | Refills: 0 | Status: DISCONTINUED | COMMUNITY
Start: 2022-12-29 | End: 2023-01-23

## 2023-01-23 NOTE — HEALTH RISK ASSESSMENT
[Good] : ~his/her~  mood as  good [No falls in past year] : Patient reported no falls in the past year [0] : 2) Feeling down, depressed, or hopeless: Not at all (0) [PHQ-2 Negative - No further assessment needed] : PHQ-2 Negative - No further assessment needed [[id number]] : 0

## 2023-01-23 NOTE — HISTORY OF PRESENT ILLNESS
[de-identified] : 68 year old male presents for initial annual exam.\par \par after getting covid in 12/2022, still having headache, sinus congestion, since then.  \par \par unsure of pneumonia vaccine \par \par colonoscopy ~7 years ago \par

## 2023-01-23 NOTE — ASSESSMENT
[FreeTextEntry1] : 68 year old male presents for initial annual exam.\par \par symptoms consistent with post-covid \par -reassured \par -f/u with neuro if headaches continue, (h/o migraine as a child)\par \par Annual \par -Advise to get yearly Flu shot\par -Advise Yearly Skin cancer screening with Dermatologist \par -Advise Yearly Eye exam with Ophthalmologist\par -Advise Yearly Dental exam\par -Educated of the importance of Healthy diet, such as Mediterranean Diet and Exercise, such as walking >20 minutes a day and increasing gradually as tolerated\par \par Preventative screening \par -advised to get colonoscopy for colon cancer screening -will check when last was with his GI\par -will check PSA for prostate cancer screening -up to date \par \par -covid -up to date \par -[id number] -will check as unsure is he has gotten one \par -discussed shingles vaccine (shingrix), advised to verify with insurance if its covered through medical or prescription plan\par \par \par \par \par

## 2023-01-23 NOTE — PHYSICAL EXAM
[Normal] : normal rate, regular rhythm, normal S1 and S2 and no murmur heard [Pedal Pulses Present] : the pedal pulses are present [No Edema] : there was no peripheral edema [No Extremity Clubbing/Cyanosis] : no extremity clubbing/cyanosis [Soft] : abdomen soft [Non Tender] : non-tender [Non-distended] : non-distended [No HSM] : no HSM [Normal Bowel Sounds] : normal bowel sounds [Normal Posterior Cervical Nodes] : no posterior cervical lymphadenopathy [Normal Anterior Cervical Nodes] : no anterior cervical lymphadenopathy [No CVA Tenderness] : no CVA  tenderness [No Joint Swelling] : no joint swelling [No Rash] : no rash [No Focal Deficits] : no focal deficits [Normal Affect] : the affect was normal [Normal Mood] : the mood was normal [de-identified] : not performed as wearing mask due to covid precautions

## 2023-01-24 ENCOUNTER — CLINICAL ADVICE (OUTPATIENT)
Age: 68
End: 2023-01-24

## 2023-01-24 ENCOUNTER — NON-APPOINTMENT (OUTPATIENT)
Age: 68
End: 2023-01-24

## 2023-01-24 LAB
ALBUMIN SERPL ELPH-MCNC: 4.5 G/DL
ALP BLD-CCNC: 59 U/L
ALT SERPL-CCNC: 28 U/L
ANION GAP SERPL CALC-SCNC: 12 MMOL/L
APPEARANCE: CLEAR
AST SERPL-CCNC: 23 U/L
BACTERIA: NEGATIVE
BASOPHILS # BLD AUTO: 0.05 K/UL
BASOPHILS NFR BLD AUTO: 0.6 %
BILIRUB DIRECT SERPL-MCNC: 0.1 MG/DL
BILIRUB INDIRECT SERPL-MCNC: 0.2 MG/DL
BILIRUB SERPL-MCNC: 0.3 MG/DL
BILIRUBIN URINE: NEGATIVE
BLOOD URINE: NEGATIVE
BUN SERPL-MCNC: 22 MG/DL
CALCIUM SERPL-MCNC: 9.4 MG/DL
CHLORIDE SERPL-SCNC: 104 MMOL/L
CHOLEST SERPL-MCNC: 106 MG/DL
CO2 SERPL-SCNC: 26 MMOL/L
COLOR: NORMAL
CREAT SERPL-MCNC: 1.08 MG/DL
EGFR: 74 ML/MIN/1.73M2
EOSINOPHIL # BLD AUTO: 0.26 K/UL
EOSINOPHIL NFR BLD AUTO: 3.2 %
ESTIMATED AVERAGE GLUCOSE: 126 MG/DL
FERRITIN SERPL-MCNC: 78 NG/ML
FOLATE SERPL-MCNC: 14.3 NG/ML
GLUCOSE QUALITATIVE U: NEGATIVE
GLUCOSE SERPL-MCNC: 100 MG/DL
HBA1C MFR BLD HPLC: 6 %
HCT VFR BLD CALC: 52.1 %
HDLC SERPL-MCNC: 61 MG/DL
HGB BLD-MCNC: 16.9 G/DL
HYALINE CASTS: 0 /LPF
IMM GRANULOCYTES NFR BLD AUTO: 0.2 %
INFLUENZA A RESULT: NOT DETECTED
INFLUENZA B RESULT: NOT DETECTED
IRON SATN MFR SERPL: 30 %
IRON SERPL-MCNC: 98 UG/DL
KETONES URINE: NEGATIVE
LDLC SERPL CALC-MCNC: 25 MG/DL
LEUKOCYTE ESTERASE URINE: NEGATIVE
LYMPHOCYTES # BLD AUTO: 1.48 K/UL
LYMPHOCYTES NFR BLD AUTO: 18.3 %
MAN DIFF?: NORMAL
MCHC RBC-ENTMCNC: 30.7 PG
MCHC RBC-ENTMCNC: 32.4 GM/DL
MCV RBC AUTO: 94.7 FL
MICROSCOPIC-UA: NORMAL
MONOCYTES # BLD AUTO: 0.49 K/UL
MONOCYTES NFR BLD AUTO: 6.1 %
NEUTROPHILS # BLD AUTO: 5.77 K/UL
NEUTROPHILS NFR BLD AUTO: 71.6 %
NITRITE URINE: NEGATIVE
NONHDLC SERPL-MCNC: 44 MG/DL
PH URINE: 5.5
PLATELET # BLD AUTO: 202 K/UL
POTASSIUM SERPL-SCNC: 5 MMOL/L
PROT SERPL-MCNC: 6.5 G/DL
PROTEIN URINE: NEGATIVE
PSA SERPL-MCNC: 1.08 NG/ML
RBC # BLD: 5.5 M/UL
RBC # FLD: 13.7 %
RED BLOOD CELLS URINE: 0 /HPF
RESP SYN VIRUS RESULT: NOT DETECTED
SARS-COV-2 RESULT: DETECTED
SODIUM SERPL-SCNC: 142 MMOL/L
SPECIFIC GRAVITY URINE: 1.01
SQUAMOUS EPITHELIAL CELLS: 0 /HPF
TIBC SERPL-MCNC: 321 UG/DL
TRIGL SERPL-MCNC: 98 MG/DL
TSH SERPL-ACNC: 1.94 UIU/ML
UIBC SERPL-MCNC: 223 UG/DL
UROBILINOGEN URINE: NORMAL
VIT B12 SERPL-MCNC: 330 PG/ML
WBC # FLD AUTO: 8.07 K/UL
WHITE BLOOD CELLS URINE: 0 /HPF

## 2023-01-29 PROBLEM — J34.89 SINUS PRESSURE: Status: ACTIVE | Noted: 2020-07-23

## 2023-01-29 NOTE — REASON FOR VISIT
[Home] : at home, [unfilled] , at the time of the visit. [Medical Office: ([facility])___] : at the medical office located in  [Patient] : the patient [Follow-Up] : a follow-up visit [Asthma] : asthma

## 2023-01-29 NOTE — PHYSICAL EXAM
[No Acute Distress] : no acute distress [No Focal Deficits] : no focal deficits [Normal Color/ Pigmentation] : normal color/ pigmentation [Oriented x3] : oriented x3 [Normal Affect] : normal affect

## 2023-01-29 NOTE — ASSESSMENT
[FreeTextEntry1] : Mr. CROSS is a 68 year year old male with a history of cardiac disease, sarcoidosis, asthma, allergy, OSAS, s/p syncope (loop recorder) who presents via video for a follow-up pulmonary evaluation. \par \par His chief complaint is post COVID. \par \par 1. Post COVID\par - advised low histamine diet\par \par 2.Sinus HA\par -recommended Neilmed sinus rinse\par -recommended OTC sinus decongestion\par \par 3. Asthma\par -Continue Symbicort 160 2 inhalations BID \par -continue Singulair 10 mg before bed\par -continue Spiriva 2 puffs QD\par \par 4. Allergies\par -continue nasal spray\par \par Patient to follow up with Dr. Slaughter as scheduled.\par Patient to call with further questions and concerns.\par Patient verbalizes understanding of care plan and is agreeable.\par

## 2023-01-29 NOTE — REVIEW OF SYSTEMS
[Fatigue] : fatigue [Nasal Congestion] : nasal congestion [Postnasal Drip] : postnasal drip [Sinus Problems] : sinus problems [Cough] : cough [Negative] : Psychiatric [Recent Wt Gain (___ Lbs)] : ~T no recent weight gain [Fever] : no fever [EDS] : no eds [Chills] : no chills [Poor Appetite] : no poor appetite [Recent Wt Loss (___ Lbs)] : ~T no recent weight loss [Dry Eyes] : no dry eyes [Ear Disturbance] : no ear disturbance [Epistaxis] : no epistaxis [Sore Throat] : no sore throat [Eye Irritation] : no eye irritation [Dry Mouth] : no dry mouth [Mouth Ulcers] : no mouth ulcers [Poor Dentition] : no poor dentition [Edentulous] : no edentulous [Hemoptysis] : no hemoptysis [Chest Tightness] : no chest tightness [Frequent URIs] : no frequent URIs [Dyspnea] : no dyspnea [Pleuritic Pain] : no pleuritic pain [Wheezing] : no wheezing [A.M. Dry Mouth] : no a.m. dry mouth [SOB on Exertion] : no sob on exertion

## 2023-01-29 NOTE — HISTORY OF PRESENT ILLNESS
[TextBox_4] : Mr. CROSS is a 68 year old male with a history of cardiac disease, sarcoidosis, asthma, allergy, OSAS, s/p syncope (loop recorder) who presents via video for a follow-up pulmonary evaluation. \par \par His chief complaint is post COVID. \par \par Patient states he tested positive for COVID infection 12/29/2022 and completed Paxlovid. He reported he tested negative after 7 days. Patient reports he tested positive for COVID again 4 days ago \par \par Patient reports he has sinus HA, fatigue, slight dry cough, and PND. He reports he is currently taking saline nasal spray and Tylenol with benefit. Patient state he wants to know if he should do anything else. \par \par Patient denies fever chills, SOB @ rest or exertion, or wheezing. \par \par

## 2023-02-28 ENCOUNTER — APPOINTMENT (OUTPATIENT)
Dept: PULMONOLOGY | Facility: CLINIC | Age: 68
End: 2023-02-28
Payer: MEDICARE

## 2023-02-28 VITALS
BODY MASS INDEX: 25.55 KG/M2 | OXYGEN SATURATION: 96 % | WEIGHT: 159 LBS | DIASTOLIC BLOOD PRESSURE: 80 MMHG | TEMPERATURE: 97 F | HEART RATE: 65 BPM | SYSTOLIC BLOOD PRESSURE: 124 MMHG | HEIGHT: 66 IN | RESPIRATION RATE: 16 BRPM

## 2023-02-28 DIAGNOSIS — F41.9 ANXIETY DISORDER, UNSPECIFIED: ICD-10-CM

## 2023-02-28 DIAGNOSIS — U07.1 COVID-19: ICD-10-CM

## 2023-02-28 PROCEDURE — 99214 OFFICE O/P EST MOD 30 MIN: CPT | Mod: CS,25

## 2023-02-28 PROCEDURE — 94729 DIFFUSING CAPACITY: CPT

## 2023-02-28 PROCEDURE — 94727 GAS DIL/WSHOT DETER LNG VOL: CPT

## 2023-02-28 PROCEDURE — ZZZZZ: CPT

## 2023-02-28 PROCEDURE — 95012 NITRIC OXIDE EXP GAS DETER: CPT

## 2023-02-28 PROCEDURE — 94010 BREATHING CAPACITY TEST: CPT

## 2023-02-28 NOTE — ADDENDUM
[FreeTextEntry1] : Documented by Roberto Carlos Her acting as a scribe for Dr. Nazario Slaughter on 02/28/2023.\par \par All medical record entries made by the Scribe were at my, Dr. Nazario Slaughter's, direction and personally dictated by me on 02/28/2023. I have reviewed the chart and agree that the record accurately reflects my personal performance of the history, physical exam, assessment and plan. I have also personally directed, reviewed, and agree with the discharge instructions.

## 2023-02-28 NOTE — ASSESSMENT
[FreeTextEntry1] : Mr. Feliz is a 68 year old male with a history of cardiac disease, sarcoidosis, asthma, allergy, OSAS, s/p syncope (loop recorder) presenting to the office today. Currently c/w inactive sarcoidosis improved - with steroids; #1 issue is sleep (tear)/ #2 MS pain\par \par His shortness of breath is felt to be multifactorial due to: \par -chest pain syndrome being costochondritis \par -cardiac disease\par -poor breathing mechanics\par -out of shape\par -asthma (stable)\par -sarcoidosis\par \par problem 1: asthma- (controlled)\par -Continue Symbicort 160 2 inhalations BID \par -continue Singulair 10 mg before bed\par -continue Spiriva 2 puffs QD \par -Asthma is  believed to be caused by inherited (genetic) and environmental factor, but its exact cause is unknown. Asthma may be triggered by allergens, lung infections, or irritants in the air. Asthma triggers are different for each person\par -Inhaler technique reviewed as well as oral hygiene techniques reviewed with patient. Avoidance of cold air, extremes of temperature, rescue inhaler should be used before exercise. Order of medication reviewed with patient. Recommended use of a cool mist humidifier in the bedroom.\par \par problem 2: allergic rhinitis/sinusitis (NONCOMPLIANT) -Active#1 \par -On Dupixent 300 d1Bnfwb - since 2020 \par -Continue Clarinex 5mg QHS \par -he is being recommended to use nasal saline - Navage "Ponaris"\par - Qnasl 1 sniff BID or Xhance 1 sniff BID (Budesonide .5 BID)\par -continue Olopatadine .6 sniff each nostril BID\par \par Environmental measures for allergies were encouraged including mattress and pillow cover, air purifier, and environmental controls. \par \par problem 3: GERD\par -continue Pepcid 40 mg QHS\par -Rule of 2's- Avoid eating too much, too late, too poorly, too spicy, or two hours before bed \par -Things to avoid including overeating, spicy foods, tight clothing, eating within three hours of bed, this list is not all inclusive. \par -For treatment of reflux, possible options discussed including diet control, H2 blockers, PPIs, as well as coating motility agents discussed as treatment options. Timing of meals and proximity of last meal to sleep were discussed. If symptoms persist, a formal gastrointestinal evaluation is needed.\par \par problem 4: NIKKY\par -Epsom salt bath before bed\par -add MyoCalm PM\par -he is being recommended an oral appliance and highly encourage to follow up with Dr. Nawaf Hoang; Bryn\par -Discussed the risks/associations with coronary artery disease, atrial fibrillation, arrhythmia, memory loss, issues with concentration, stroke risk, hypertension, nocturia, chronic reflux/Hassan’s esophagus some but not all inclusive. Treatment options discussed including CPAP/BiPAP machine, oral appliance, ProVent therapy, Oxy-Aid by [employer]pite[employer], new technologies, or positional sleep. \par \par problem 5: sarcoidosis (?active) vs. ?infection vs. BOOP - improved (2nd opinion Dr. Christen Gallegos [facility])- (quiet)\par -s/p Prednisone 20mg QDay x 1 month then reevaluate - alternate 20 mg even/15 mg odd - move to 5mg qd x2weeks then reduce by 1mg every 2weeks to off \par -Information sheet given to the patient to be reviewed, this medication is never to be used without consulting the prescribing physician. Proper dietary restraint is necessary specifically salt containing foods, if any reaction may occur should be reported. \par \par -s/p Biaxin 500 mg BID x 2 weeks\par - s/p CXR in 3/2022 - [facility] \par -s/p CT in 2 months - 11/15/2021- returned to normal \par -continue yearly ophthalmological evaluations/PFTs\par -Sarcoidosis is a medical mystery and can present with very serious illness or little consequence. The disease can affect any organ including skin, liver, lymph glands, spleen, eyes, nervous system, musculoskeletal system, heart, brain, kidneys, and including the lungs. Pulmonary sarcoidosis can cause loss of lung volume and abnormal lung stiffness. This disease is characterized by presence of granulomas, small areas of inflamed cells. Sarcoidosis patients should have regular cardiac and eye examinations as well as regular PFTs.\par \par Problem 5A:\par -continue Neurontin 100 qHS (no interest)\par \par problem 6: cardiac disease\par -continue to follow up with the cardiologist Dr. Lisker \par -s/p cardiac MRI 2019\par \par problem 7: overweight\par -Recommended Ander Duarte's 10-day detox diet and book. \par -Weight loss, exercise, and diet control were discussed and are highly encouraged. Treatment options were given such as, aqua therapy, and contacting a nutritionist. Recommended to use the elliptical, stationary bike, less use of treadmill.  Obesity is associated with worsening asthma, shortness of breath, and potential for cardiac disease, diabetes, and other underlying medical conditions. \par \par Problem 8: Costochondritis (Quiet)\par -add SPM/quercitin\par -recommended Topricin cream\par -recommend P6 cream\par \par Problem 9: Health Maintenance/COVID19 Precautions:\par -s/p COVID-19 infection 12/2022\par -recommended Sanotize anti viral nasal spray in case of viral infection \par - S/p Covid 19 vaccine (Moderna) x5\par Immune Support Recommendations:\par \par -OTC Vitamin C 500mg BID \par -OTC Quercetin 250-500mg BID \par -OTC Zinc 75-100mg per day \par -OTC Melatonin 1or 2mg a night \par -OTC Vitamin D 1-4000mg per day \par \par Problem 10: health maintenance\par -continue Urology f/u\par -recommended a yearly flu shot after October 15 (2022)\par -recommended strep pneumonia vaccines: Prevnar-13 vaccine, followed by Pneumo vaccine 23 on year following (completed)\par -recommended early intervention for URIs\par -recommended osteoporosis evaluations\par -recommended early dermatological evaluations\par -recommended after the age of 50 to the age of 70, colonoscopy every 5 years\par -encouraged early intervention\par \par F/U in 4 months with spi\par He is encouraged to call with any changes, concerns, or questions

## 2023-02-28 NOTE — HISTORY OF PRESENT ILLNESS
[FreeTextEntry1] : Mr. CROSS is a 68 year year old male with a history of cardiac disease, sarcoidosis, asthma, allergy, OSAS, s/p syncope (loop recorder) who presents today  for a follow-up pulmonary evaluation. His chief complaint is \par \par -he notes feeling generally well\par -he notes exercising (walking)\par -he notes muscular issues in his legs which he thinks is due to statin use\par -he notes muscle cramps and spasms\par -he notes his bowels are regular\par -he notes his sense of smell and taste are normal\par -he denies any acute visual issues \par -he notes that he is not sleeping well\par -he notes his sleep is interrupted \par -he notes his sinuses are problematic at the moment \par -he denies hoarseness \par -no new medications, vitamins, or supplements \par -he notes feeling that his legs are heavy, they feel like they are in quicksand\par \par \par \par -patient denies any headaches, nausea, vomiting, fever, chills, sweats, chest pain, chest pressure, palpitations, coughing, wheezing, fatigue, diarrhea, constipation, dysphagia, dizziness, leg swelling, itchy eyes, itchy ears, heartburn, reflux or sour taste in the mouth  Universal Safety Interventions

## 2023-02-28 NOTE — PROCEDURE
[FreeTextEntry1] : Full PFT revealed normal flows, with a FEV1 of 3.46L, which is 131% of predicted, normal lung volumes, and a diffusion of 29.1, which is 157% of predicted, with a normal flow volume loop \par \par Feno was 9; a normal value being less than 25. Fractional exhaled nitric oxide (FENO) is regarded as a simple, noninvasive method for assessing eosinophilic airway inflammation. Produced by a variety of cells within the lung, nitric oxide (NO) concentrations are generally low in healthy individuals. However, high concentrations of NO appear to be involved in nonspecific host defense mechanisms and chronic inflammatory  diseases such as asthma. The American Thoracic Society (ATS) therefore recommended using FENO to aid in the diagnosis and monitoring of eosinophilic airway inflammation and asthma, and for identifying steroid responsive individuals whose chronic respiratory symptoms may be caused by airway inflammation

## 2023-05-08 ENCOUNTER — NON-APPOINTMENT (OUTPATIENT)
Age: 68
End: 2023-05-08

## 2023-05-09 ENCOUNTER — APPOINTMENT (OUTPATIENT)
Dept: CARDIOLOGY | Facility: CLINIC | Age: 68
End: 2023-05-09
Payer: MEDICARE

## 2023-05-09 ENCOUNTER — NON-APPOINTMENT (OUTPATIENT)
Age: 68
End: 2023-05-09

## 2023-05-09 VITALS
OXYGEN SATURATION: 98 % | DIASTOLIC BLOOD PRESSURE: 80 MMHG | HEART RATE: 45 BPM | SYSTOLIC BLOOD PRESSURE: 118 MMHG | BODY MASS INDEX: 26.95 KG/M2 | WEIGHT: 167 LBS

## 2023-05-09 PROCEDURE — 99214 OFFICE O/P EST MOD 30 MIN: CPT

## 2023-05-09 PROCEDURE — 93000 ELECTROCARDIOGRAM COMPLETE: CPT

## 2023-05-09 NOTE — DISCUSSION/SUMMARY
[FreeTextEntry1] : 68 year old with with coronary disease and resolved active sarcoidosis.\par ECG unchanged.\par His coronary disease is stable. No angina. Continue aggressive medical therapy including aspirin, and Praluent alone. May add zetia if LDL < 70\par LDL was at goal.\par Bradycardial unchanged.\par Muscle aches: to see rheum.\par Labs drawn.  \par   [EKG obtained to assist in diagnosis and management of assessed problem(s)] : EKG obtained to assist in diagnosis and management of assessed problem(s)

## 2023-05-09 NOTE — HISTORY OF PRESENT ILLNESS
[FreeTextEntry1] : Significant pain in muscles persists.\par Off statin for 2 months.\par Got thru COVID with Paxlovid.\par No syncope or near syncope.\par \par Prior:\par Left leg muscle tear. \par Vaccinated 5 times. \par \par Prior:\par Cramps in his forearms at night.\par Recent pulmonary eval and treatment for asthma.\par Concern about dyspnea too.\par \par Prior:\par Has been on tapering dose of prednisone for his sarcoid lung issues.\par Left sided chest, neck, shoulder pain on and off for the past two weeks.Tender and positional. Significant fatigue "to the bone". Poor sleeping too.\par Planning chest CT for sarcoid, as cough is returning.\par \par Prior:\par He has been undergoing treatment for active sarcoidosis that has affected his lungs.  His most prominent complaint is a dry nonproductive cough.\par His biggest concern for today's visit though is his severe coronary calcification reported on the CT scan of his chest.  He has no exertional chest pains or shortness of breath beyond the usual.\par \par \par Prior:\par "Not totally inactive"\par Taking Praluent as directed.\par Sinus issues requiring surgery planned.  \par Feels good overal.\par Nothing on loop recorder.\par Walks a fair amount.(5k+ per day) and feels good, no chest pains,  but muscle aches persist.\par \par \par \par

## 2023-05-15 ENCOUNTER — TRANSCRIPTION ENCOUNTER (OUTPATIENT)
Age: 68
End: 2023-05-15

## 2023-05-16 ENCOUNTER — APPOINTMENT (OUTPATIENT)
Dept: RHEUMATOLOGY | Facility: CLINIC | Age: 68
End: 2023-05-16
Payer: MEDICARE

## 2023-05-16 VITALS
HEIGHT: 65 IN | OXYGEN SATURATION: 98 % | SYSTOLIC BLOOD PRESSURE: 144 MMHG | HEART RATE: 52 BPM | DIASTOLIC BLOOD PRESSURE: 88 MMHG | TEMPERATURE: 97.1 F | BODY MASS INDEX: 27.66 KG/M2 | RESPIRATION RATE: 16 BRPM | WEIGHT: 166 LBS

## 2023-05-16 DIAGNOSIS — M47.812 SPONDYLOSIS W/OUT MYELOPATHY OR RADICULOPATHY, CERVICAL REGION: ICD-10-CM

## 2023-05-16 DIAGNOSIS — M79.2 NEURALGIA AND NEURITIS, UNSPECIFIED: ICD-10-CM

## 2023-05-16 DIAGNOSIS — M51.37 OTHER INTERVERTEBRAL DISC DEGENERATION, LUMBOSACRAL REGION: ICD-10-CM

## 2023-05-16 PROCEDURE — 99204 OFFICE O/P NEW MOD 45 MIN: CPT

## 2023-05-17 PROBLEM — M51.37 DISC DEGENERATION, LUMBOSACRAL: Status: ACTIVE | Noted: 2023-05-17

## 2023-05-17 PROBLEM — M47.812 DEGENERATIVE JOINT DISEASE OF CERVICAL SPINE: Status: ACTIVE | Noted: 2023-05-17

## 2023-05-17 NOTE — CONSULT LETTER
[Dear  ___] : Dear  [unfilled], [Consult Letter:] : I had the pleasure of evaluating your patient, [unfilled]. [Please see my note below.] : Please see my note below. [Consult Closing:] : Thank you very much for allowing me to participate in the care of this patient.  If you have any questions, please do not hesitate to contact me. [Sincerely,] : Sincerely, [FreeTextEntry2] : Jay Lisker, MD [FreeTextEntry1] : \par  [FreeTextEntry3] : Sintia Seaman MD\par Director, [facility]cul[facility]os[facility], \par Rheumatology Division, Department of Medicine\par , \par [employer] \par at [facility]\par \par [address], [address]\par [place] [address]\par Tel: [phone]\par

## 2023-05-17 NOTE — ASSESSMENT
[FreeTextEntry1] : . LE neuropathic pain \par . 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 Severe DDD\par under care of pain management- tx with Percocet , gabapentin on and off resumed 100 mg 3-4 a day 7 weeks ago\par . H/o sarcoidosis , dx on lung bx\par \par \par = continue off statin for now , add Coenzyme Q 10 100- 200 mg a day to mitigate myotoxicity of statins\par although statins are not contraindicated - but if has problem tolerating other cholesterol lowering agents with different MOA could be considered\par = also sms likely are multifactorial and DDD is likely to play a significant role -  spine center/ pain management\par = would titrate up Gabapentin dose for pain control  - 100-100- 300 mg \par \par \par FU as needed\par

## 2023-05-17 NOTE — HISTORY OF PRESENT ILLNESS
[FreeTextEntry1] : \par 1. had history of low grade benign myopathic process due to statin use with myalgia  with mildly increased CPK \par \par in past was  in high 800 - on Crestor/ or another statin on and off , Zetia, on Lipitor since 2015\par over years continued with Lipitor - in last few years the dose was increased 80 mg a day .\par \par 2. Severe DDD, had epidural injections with relief\par had no epidural injection since 2018\par on PT for cervical and lumbar spine\par under care of pain management- tx with Percocet , gabapentin on and off resumed 100 mg 3-4 a day 7 weeks ago\par \par 3. H/o sarcoidosis , dx on lung bx\par \par 4. angioplasty 10/2016 and 11/2016\par = since 2015 back on Lipitor 80\par \par \par Current c/o burning pain in thighs and calves, when walks feels like walking in quick sand\par

## 2023-05-17 NOTE — PHYSICAL EXAM
[General Appearance - Alert] : alert [General Appearance - In No Acute Distress] : in no acute distress [General Appearance - Well Nourished] : well nourished [Sclera] : the sclera and conjunctiva were normal [Nasal Cavity] : the nasal mucosa and septum were normal [Oropharynx] : the oropharynx was normal [Auscultation Breath Sounds / Voice Sounds] : lungs were clear to auscultation bilaterally [Heart Rate And Rhythm] : heart rate was normal and rhythm regular [Heart Sounds] : normal S1 and S2 [Edema] : there was no peripheral edema [Bowel Sounds] : normal bowel sounds [Abdomen Soft] : soft [Abdomen Tenderness] : non-tender [] : no hepato-splenomegaly [Cervical Lymph Nodes Enlarged Posterior Bilaterally] : posterior cervical [Cervical Lymph Nodes Enlarged Anterior Bilaterally] : anterior cervical [Axillary Lymph Nodes Enlarged Bilaterally] : axillary [Musculoskeletal - Swelling] : no joint swelling seen [Motor Tone] : muscle strength and tone were normal [FreeTextEntry1] : + large plaque over left ankle [Motor Exam] : the motor exam was normal [No Focal Deficits] : no focal deficits [Oriented To Time, Place, And Person] : oriented to person, place, and time [Impaired Insight] : insight and judgment were intact

## 2023-05-21 LAB
ALBUMIN SERPL ELPH-MCNC: 4.2 G/DL
ALP BLD-CCNC: 58 U/L
ALT SERPL-CCNC: 17 U/L
ANION GAP SERPL CALC-SCNC: 11 MMOL/L
AST SERPL-CCNC: 20 U/L
BASOPHILS # BLD AUTO: 0.05 K/UL
BASOPHILS NFR BLD AUTO: 1 %
BILIRUB SERPL-MCNC: 0.5 MG/DL
BUN SERPL-MCNC: 19 MG/DL
CALCIUM SERPL-MCNC: 9.4 MG/DL
CHLORIDE SERPL-SCNC: 102 MMOL/L
CHOLEST SERPL-MCNC: 167 MG/DL
CK SERPL-CCNC: 220 U/L
CO2 SERPL-SCNC: 27 MMOL/L
CREAT SERPL-MCNC: 0.93 MG/DL
EGFR: 89 ML/MIN/1.73M2
EOSINOPHIL # BLD AUTO: 0.31 K/UL
EOSINOPHIL NFR BLD AUTO: 6 %
GLUCOSE SERPL-MCNC: 100 MG/DL
HCT VFR BLD CALC: 53.8 %
HDLC SERPL-MCNC: 63 MG/DL
HGB BLD-MCNC: 17.3 G/DL
IMM GRANULOCYTES NFR BLD AUTO: 0.2 %
LDLC SERPL CALC-MCNC: 75 MG/DL
LYMPHOCYTES # BLD AUTO: 1.04 K/UL
LYMPHOCYTES NFR BLD AUTO: 20.2 %
MAN DIFF?: NORMAL
MCHC RBC-ENTMCNC: 31 PG
MCHC RBC-ENTMCNC: 32.2 GM/DL
MCV RBC AUTO: 96.4 FL
MONOCYTES # BLD AUTO: 0.42 K/UL
MONOCYTES NFR BLD AUTO: 8.1 %
NEUTROPHILS # BLD AUTO: 3.33 K/UL
NEUTROPHILS NFR BLD AUTO: 64.5 %
NONHDLC SERPL-MCNC: 103 MG/DL
PLATELET # BLD AUTO: 179 K/UL
POTASSIUM SERPL-SCNC: 5 MMOL/L
PROT SERPL-MCNC: 6.2 G/DL
RBC # BLD: 5.58 M/UL
RBC # FLD: 12.5 %
SODIUM SERPL-SCNC: 140 MMOL/L
TRIGL SERPL-MCNC: 140 MG/DL
WBC # FLD AUTO: 5.16 K/UL

## 2023-06-20 ENCOUNTER — NON-APPOINTMENT (OUTPATIENT)
Age: 68
End: 2023-06-20

## 2023-06-26 ENCOUNTER — RX RENEWAL (OUTPATIENT)
Age: 68
End: 2023-06-26

## 2023-06-29 RX ORDER — LEVALBUTEROL HYDROCHLORIDE 0.31 MG/3ML
0.31 SOLUTION RESPIRATORY (INHALATION)
Qty: 120 | Refills: 0 | Status: ACTIVE | COMMUNITY
Start: 2023-06-29 | End: 1900-01-01

## 2023-06-29 RX ORDER — BUDESONIDE 0.5 MG/2ML
0.5 INHALANT ORAL TWICE DAILY
Qty: 60 | Refills: 3 | Status: ACTIVE | COMMUNITY
Start: 2021-12-22 | End: 1900-01-01

## 2023-07-05 ENCOUNTER — APPOINTMENT (OUTPATIENT)
Dept: PULMONOLOGY | Facility: CLINIC | Age: 68
End: 2023-07-05
Payer: MEDICARE

## 2023-07-05 VITALS
DIASTOLIC BLOOD PRESSURE: 82 MMHG | SYSTOLIC BLOOD PRESSURE: 124 MMHG | TEMPERATURE: 97.6 F | RESPIRATION RATE: 16 BRPM | WEIGHT: 165.7 LBS | OXYGEN SATURATION: 96 % | HEIGHT: 65 IN | BODY MASS INDEX: 27.61 KG/M2 | HEART RATE: 51 BPM

## 2023-07-05 PROCEDURE — 94010 BREATHING CAPACITY TEST: CPT

## 2023-07-05 PROCEDURE — ZZZZZ: CPT

## 2023-07-05 PROCEDURE — 95012 NITRIC OXIDE EXP GAS DETER: CPT

## 2023-07-05 PROCEDURE — 99214 OFFICE O/P EST MOD 30 MIN: CPT | Mod: 25

## 2023-07-05 PROCEDURE — 94727 GAS DIL/WSHOT DETER LNG VOL: CPT

## 2023-07-05 PROCEDURE — 94729 DIFFUSING CAPACITY: CPT

## 2023-07-05 NOTE — DISCUSSION/SUMMARY
[FreeTextEntry1] : 12/15/2021 IMPRESSION:\par Stable appearance of upper lobe predominant ill-defined groundglass densities since 2012. Stable small calcified mediastinal and hilar lymph nodes. No new findings.\par \par

## 2023-07-05 NOTE — REVIEW OF SYSTEMS
[Cough] : cough [Negative] : Endocrine [GERD] : gerd [Chest Tightness] : no chest tightness [Sputum] : no sputum [Dyspnea] : no dyspnea [Wheezing] : no wheezing [SOB on Exertion] : no sob on exertion

## 2023-07-05 NOTE — REASON FOR VISIT
[Acute] : an acute visit [Cough] : cough [Abnormal CXR/ Chest CT] : an abnormal CXR/ chest CT [Asthma] : asthma [Sarcoidosis] : sarcoidosis

## 2023-07-05 NOTE — PROCEDURE
[FreeTextEntry1] : Pulmonary testing performed in office today because of cough. \par \par PFT's performed in office WNL. \par FEV1: 141% \par FEV1/FVC Ratio: 78\par AXP79-42%: 123% \par DLCO: 122% \par \par Feno: 9 ppb; WNL. \par

## 2023-07-05 NOTE — HISTORY OF PRESENT ILLNESS
[TextBox_4] : Mr. Feliz is a 68 year old male with a history of cardiac disease, sarcoidosis, asthma, allergy, OSAS, s/p syncope (loop recorder), inactive sarcoidosis improved with steroids, Covid-19 infection, GERD, & Depression. He presents for an acute care visit. \par \par His chief concern is cough x 1 week. \par \par Patient spoke with RN, Cara, on 6/29/2023 via telephone:\par "patient states he developed cough overnight. Denies fever or productive cough. Patient states he is having some difficulty getting air in. Denies taking inhalers at this time and does not have any available to him. Patient would like to know if a new inhaler can be prescribed for him. He cancelled his appointment with you yesterday but rescheduled to see DEX Gomez on 7/5/23 and have PFT."\par \par 7/5/2023 UPDATE IN OFFICE VISIT:\par \par He states cough was prominent on 6/28/23, which is why he contacted office on 6/29/23. \par Patient states cough improved since initiating Symbicort.\par He states it feels like a dry cough, which is similar to when he had sarcoidosis flare up.\par He states laying down can worsen cough.\par He admits to recent minimal reflux. \par \par He states chronic condition of sinus issues, for which he follows with ENT, Dr. Nathanael Zhao. \par He states currently on Dupixent due to nasal polyps. \par \par Patient states he has not used Levalbuterol and Budesonide nebulizer as he does not have nebulizer device. \par \par He denies fever/chills, decreased appetite, fatigue, chest tightness, shortness of breath at rest or exertion, or wheezing.\par

## 2023-07-05 NOTE — ASSESSMENT
[FreeTextEntry1] : Mr. Feliz is a 68 year old male with a history of cardiac disease, sarcoidosis, asthma, allergy, OSAS, s/p syncope (loop recorder), inactive sarcoidosis improved with steroids, Covid-19 infection, GERD, & Depression. He presents for an acute care visit. His chief concern is cough x 1 week. \par \par 1. Cough:\par - likely r/t asthma and GERD: see plan below. \par \par 2. Asthma:\par - Nebulizer dispensed at check out.\par - Add Levalbuterol via neb Q6H PRN, but at least BID.\par - Add Budesonide via nebulizer BID. Rinse and gargle post use.\par - Continue Symbicort 2 puffs BID. Rinse and gargle post use. \par \par 3. Abnormal Chest CT - Sarcoidosis:\par - Follow up Chest CT RX'ed. \par \par 4. GERD:\par - Add Famotidine 40mg PO @ bedtime nightly - patient has been using only PRN. \par \par Patient to follow up with Dr. Slaughter as scheduled.\par Patient to call with further questions and concerns.\par Patient verbalizes understanding of care plan and is agreeable.\par

## 2023-07-07 ENCOUNTER — OUTPATIENT (OUTPATIENT)
Dept: OUTPATIENT SERVICES | Facility: HOSPITAL | Age: 68
LOS: 1 days | End: 2023-07-07
Payer: MEDICARE

## 2023-07-07 ENCOUNTER — APPOINTMENT (OUTPATIENT)
Dept: CT IMAGING | Facility: IMAGING CENTER | Age: 68
End: 2023-07-07
Payer: MEDICARE

## 2023-07-07 DIAGNOSIS — D86.9 SARCOIDOSIS, UNSPECIFIED: ICD-10-CM

## 2023-07-07 DIAGNOSIS — R93.89 ABNORMAL FINDINGS ON DIAGNOSTIC IMAGING OF OTHER SPECIFIED BODY STRUCTURES: ICD-10-CM

## 2023-07-07 DIAGNOSIS — R05.9 COUGH, UNSPECIFIED: ICD-10-CM

## 2023-07-07 DIAGNOSIS — Z98.89 OTHER SPECIFIED POSTPROCEDURAL STATES: Chronic | ICD-10-CM

## 2023-07-07 PROCEDURE — 71250 CT THORAX DX C-: CPT | Mod: 26,MH

## 2023-07-07 PROCEDURE — 71250 CT THORAX DX C-: CPT

## 2023-07-12 ENCOUNTER — NON-APPOINTMENT (OUTPATIENT)
Age: 68
End: 2023-07-12

## 2023-08-04 ENCOUNTER — APPOINTMENT (OUTPATIENT)
Dept: UROLOGY | Facility: CLINIC | Age: 68
End: 2023-08-04
Payer: MEDICARE

## 2023-08-04 VITALS — SYSTOLIC BLOOD PRESSURE: 126 MMHG | DIASTOLIC BLOOD PRESSURE: 83 MMHG | HEART RATE: 50 BPM

## 2023-08-04 PROCEDURE — 51798 US URINE CAPACITY MEASURE: CPT

## 2023-08-04 PROCEDURE — 99214 OFFICE O/P EST MOD 30 MIN: CPT | Mod: 25

## 2023-08-04 RX ORDER — BENZONATATE 200 MG/1
200 CAPSULE ORAL 3 TIMES DAILY
Qty: 60 | Refills: 0 | Status: DISCONTINUED | COMMUNITY
Start: 2021-09-09 | End: 2023-08-04

## 2023-08-04 RX ORDER — BENZONATATE 200 MG/1
200 CAPSULE ORAL 3 TIMES DAILY
Qty: 60 | Refills: 0 | Status: DISCONTINUED | COMMUNITY
Start: 2022-03-03 | End: 2023-08-04

## 2023-08-04 RX ORDER — DIAZEPAM 10 MG/2ML
10 GEL RECTAL
Refills: 0 | Status: DISCONTINUED | COMMUNITY
End: 2023-08-04

## 2023-08-04 RX ORDER — ALBUTEROL SULFATE 90 UG/1
108 (90 BASE) INHALANT RESPIRATORY (INHALATION)
Qty: 1 | Refills: 1 | Status: DISCONTINUED | COMMUNITY
Start: 2021-09-09 | End: 2023-08-04

## 2023-08-04 RX ORDER — DIAZEPAM 2 MG/1
2 TABLET ORAL 3 TIMES DAILY
Qty: 90 | Refills: 0 | Status: DISCONTINUED | COMMUNITY
Start: 2018-09-14 | End: 2023-08-04

## 2023-08-04 RX ORDER — CLARITHROMYCIN 500 MG/1
500 TABLET, FILM COATED ORAL
Qty: 28 | Refills: 0 | Status: DISCONTINUED | COMMUNITY
Start: 2021-09-23 | End: 2023-08-04

## 2023-08-04 RX ORDER — MONTELUKAST 10 MG/1
10 TABLET, FILM COATED ORAL
Qty: 1 | Refills: 5 | Status: DISCONTINUED | COMMUNITY
Start: 2022-03-03 | End: 2023-08-04

## 2023-08-07 LAB
APPEARANCE: CLEAR
BACTERIA UR CULT: NORMAL
BACTERIA: NEGATIVE /HPF
BILIRUBIN URINE: NEGATIVE
BLOOD URINE: NEGATIVE
CAST: 0 /LPF
COLOR: YELLOW
EPITHELIAL CELLS: 0 /HPF
GLUCOSE QUALITATIVE U: NEGATIVE MG/DL
KETONES URINE: NEGATIVE MG/DL
LEUKOCYTE ESTERASE URINE: ABNORMAL
MICROSCOPIC-UA: NORMAL
NITRITE URINE: NEGATIVE
PH URINE: 5.5
PROTEIN URINE: NEGATIVE MG/DL
RED BLOOD CELLS URINE: 1 /HPF
SPECIFIC GRAVITY URINE: 1.02
UROBILINOGEN URINE: 0.2 MG/DL
WHITE BLOOD CELLS URINE: 1 /HPF

## 2023-08-08 ENCOUNTER — APPOINTMENT (OUTPATIENT)
Dept: UROLOGY | Facility: CLINIC | Age: 68
End: 2023-08-08

## 2023-08-08 LAB — URINE CYTOLOGY: NORMAL

## 2023-08-10 ENCOUNTER — APPOINTMENT (OUTPATIENT)
Dept: UROLOGY | Facility: CLINIC | Age: 68
End: 2023-08-10
Payer: MEDICARE

## 2023-08-10 ENCOUNTER — OUTPATIENT (OUTPATIENT)
Dept: OUTPATIENT SERVICES | Facility: HOSPITAL | Age: 68
LOS: 1 days | End: 2023-08-10
Payer: MEDICARE

## 2023-08-10 VITALS — DIASTOLIC BLOOD PRESSURE: 80 MMHG | SYSTOLIC BLOOD PRESSURE: 127 MMHG | HEART RATE: 43 BPM

## 2023-08-10 VITALS
DIASTOLIC BLOOD PRESSURE: 82 MMHG | SYSTOLIC BLOOD PRESSURE: 122 MMHG | OXYGEN SATURATION: 96 % | RESPIRATION RATE: 17 BRPM | HEART RATE: 54 BPM

## 2023-08-10 DIAGNOSIS — R35.0 FREQUENCY OF MICTURITION: ICD-10-CM

## 2023-08-10 DIAGNOSIS — Z98.89 OTHER SPECIFIED POSTPROCEDURAL STATES: Chronic | ICD-10-CM

## 2023-08-10 PROCEDURE — 51700 IRRIGATION OF BLADDER: CPT

## 2023-08-11 DIAGNOSIS — R39.15 URGENCY OF URINATION: ICD-10-CM

## 2023-08-11 DIAGNOSIS — R39.82 CHRONIC BLADDER PAIN: ICD-10-CM

## 2023-08-18 ENCOUNTER — APPOINTMENT (OUTPATIENT)
Dept: UROLOGY | Facility: CLINIC | Age: 68
End: 2023-08-18
Payer: MEDICARE

## 2023-08-18 ENCOUNTER — OUTPATIENT (OUTPATIENT)
Dept: OUTPATIENT SERVICES | Facility: HOSPITAL | Age: 68
LOS: 1 days | End: 2023-08-18

## 2023-08-18 VITALS
SYSTOLIC BLOOD PRESSURE: 117 MMHG | HEART RATE: 48 BPM | RESPIRATION RATE: 16 BRPM | DIASTOLIC BLOOD PRESSURE: 75 MMHG | OXYGEN SATURATION: 97 %

## 2023-08-18 VITALS — SYSTOLIC BLOOD PRESSURE: 107 MMHG | DIASTOLIC BLOOD PRESSURE: 68 MMHG

## 2023-08-18 DIAGNOSIS — R39.82 CHRONIC BLADDER PAIN: ICD-10-CM

## 2023-08-18 DIAGNOSIS — R39.15 URGENCY OF URINATION: ICD-10-CM

## 2023-08-18 DIAGNOSIS — R35.0 FREQUENCY OF MICTURITION: ICD-10-CM

## 2023-08-18 DIAGNOSIS — Z98.89 OTHER SPECIFIED POSTPROCEDURAL STATES: Chronic | ICD-10-CM

## 2023-08-18 PROCEDURE — 51700 IRRIGATION OF BLADDER: CPT

## 2023-08-21 ENCOUNTER — APPOINTMENT (OUTPATIENT)
Dept: UROLOGY | Facility: CLINIC | Age: 68
End: 2023-08-21

## 2023-08-22 NOTE — ED ADULT TRIAGE NOTE - TEMPERATURE IN FAHRENHEIT (DEGREES F)
98 PRINCIPAL DISCHARGE DIAGNOSIS  Diagnosis: Cirrhosis of liver with ascites  Assessment and Plan of Treatment: You have a history of liver cirrhosis. The fluid in your stomach was caused by this. We drained the fluid and your symptoms of shortness of breath improved.        SECONDARY DISCHARGE DIAGNOSES  Diagnosis: Imaging abnormality  Assessment and Plan of Treatment: We imaged your stomach and found a mass on your liver. Our hematologist/oncologist found this mass suspicious for cancer ***    Diagnosis: Sepsis  Assessment and Plan of Treatment: You came to the hospital with a possible infection. We believe the infection could have been in your stomach. We treated this with IV antibiotics. Your symptoms improved.    Diagnosis: Hyponatremia  Assessment and Plan of Treatment: You were found to have low sodium levels in the hospital. This can cause changes in mental status. We limited your water intake and gave you some salt tablets which improved your levels.    Diagnosis: MAIA (acute kidney injury)  Assessment and Plan of Treatment: You came to the hospital and were found to have some kidney damage. We believe this may be due to dehydration and your low blood pressure. ***    Diagnosis: Hyperkalemia  Assessment and Plan of Treatment: You were found to have high potassium in the hospital. This can have an impact on your heart. We gave you medications to lower your potassium levels. Your levels improved.    Diagnosis: T2DM (type 2 diabetes mellitus)  Assessment and Plan of Treatment: You have a history of diabetes. We managed your diabetes in the hospital with insulin.

## 2023-08-28 ENCOUNTER — OUTPATIENT (OUTPATIENT)
Dept: OUTPATIENT SERVICES | Facility: HOSPITAL | Age: 68
LOS: 1 days | End: 2023-08-28
Payer: MEDICARE

## 2023-08-28 ENCOUNTER — APPOINTMENT (OUTPATIENT)
Dept: UROLOGY | Facility: CLINIC | Age: 68
End: 2023-08-28
Payer: MEDICARE

## 2023-08-28 VITALS
RESPIRATION RATE: 16 BRPM | HEART RATE: 53 BPM | SYSTOLIC BLOOD PRESSURE: 144 MMHG | DIASTOLIC BLOOD PRESSURE: 91 MMHG | OXYGEN SATURATION: 97 %

## 2023-08-28 VITALS — DIASTOLIC BLOOD PRESSURE: 78 MMHG | SYSTOLIC BLOOD PRESSURE: 130 MMHG

## 2023-08-28 DIAGNOSIS — R35.0 FREQUENCY OF MICTURITION: ICD-10-CM

## 2023-08-28 DIAGNOSIS — Z98.89 OTHER SPECIFIED POSTPROCEDURAL STATES: Chronic | ICD-10-CM

## 2023-08-28 PROCEDURE — 51700 IRRIGATION OF BLADDER: CPT

## 2023-08-29 DIAGNOSIS — R39.82 CHRONIC BLADDER PAIN: ICD-10-CM

## 2023-09-07 ENCOUNTER — APPOINTMENT (OUTPATIENT)
Dept: UROLOGY | Facility: CLINIC | Age: 68
End: 2023-09-07
Payer: MEDICARE

## 2023-09-07 ENCOUNTER — OUTPATIENT (OUTPATIENT)
Dept: OUTPATIENT SERVICES | Facility: HOSPITAL | Age: 68
LOS: 1 days | End: 2023-09-07
Payer: MEDICARE

## 2023-09-07 VITALS
RESPIRATION RATE: 17 BRPM | HEART RATE: 70 BPM | OXYGEN SATURATION: 98 % | DIASTOLIC BLOOD PRESSURE: 82 MMHG | SYSTOLIC BLOOD PRESSURE: 136 MMHG

## 2023-09-07 VITALS — SYSTOLIC BLOOD PRESSURE: 143 MMHG | DIASTOLIC BLOOD PRESSURE: 81 MMHG | HEART RATE: 70 BPM

## 2023-09-07 DIAGNOSIS — Z98.89 OTHER SPECIFIED POSTPROCEDURAL STATES: Chronic | ICD-10-CM

## 2023-09-07 DIAGNOSIS — R35.0 FREQUENCY OF MICTURITION: ICD-10-CM

## 2023-09-07 PROCEDURE — 51700 IRRIGATION OF BLADDER: CPT

## 2023-09-08 DIAGNOSIS — R39.15 URGENCY OF URINATION: ICD-10-CM

## 2023-09-08 DIAGNOSIS — R39.82 CHRONIC BLADDER PAIN: ICD-10-CM

## 2023-09-12 ENCOUNTER — NON-APPOINTMENT (OUTPATIENT)
Age: 68
End: 2023-09-12

## 2023-09-12 ENCOUNTER — APPOINTMENT (OUTPATIENT)
Dept: CARDIOLOGY | Facility: CLINIC | Age: 68
End: 2023-09-12
Payer: MEDICARE

## 2023-09-12 VITALS
SYSTOLIC BLOOD PRESSURE: 138 MMHG | HEART RATE: 50 BPM | OXYGEN SATURATION: 96 % | WEIGHT: 166 LBS | BODY MASS INDEX: 27.62 KG/M2 | DIASTOLIC BLOOD PRESSURE: 80 MMHG

## 2023-09-12 PROCEDURE — 93000 ELECTROCARDIOGRAM COMPLETE: CPT

## 2023-09-12 PROCEDURE — 99214 OFFICE O/P EST MOD 30 MIN: CPT

## 2023-09-13 LAB
ALBUMIN SERPL ELPH-MCNC: 4.7 G/DL
ALP BLD-CCNC: 59 U/L
ALT SERPL-CCNC: 24 U/L
ANION GAP SERPL CALC-SCNC: 13 MMOL/L
AST SERPL-CCNC: 22 U/L
BILIRUB SERPL-MCNC: 0.4 MG/DL
BUN SERPL-MCNC: 22 MG/DL
CALCIUM SERPL-MCNC: 9.6 MG/DL
CHLORIDE SERPL-SCNC: 102 MMOL/L
CHOLEST SERPL-MCNC: 135 MG/DL
CK SERPL-CCNC: 263 U/L
CO2 SERPL-SCNC: 23 MMOL/L
CREAT SERPL-MCNC: 0.93 MG/DL
EGFR: 89 ML/MIN/1.73M2
ESTIMATED AVERAGE GLUCOSE: 120 MG/DL
GLUCOSE SERPL-MCNC: 104 MG/DL
HBA1C MFR BLD HPLC: 5.8 %
HCT VFR BLD CALC: 53.8 %
HDLC SERPL-MCNC: 70 MG/DL
HGB BLD-MCNC: 17.5 G/DL
LDLC SERPL CALC-MCNC: 47 MG/DL
MCHC RBC-ENTMCNC: 31.3 PG
MCHC RBC-ENTMCNC: 32.5 GM/DL
MCV RBC AUTO: 96.2 FL
NONHDLC SERPL-MCNC: 65 MG/DL
PLATELET # BLD AUTO: 171 K/UL
POTASSIUM SERPL-SCNC: 5.1 MMOL/L
PROT SERPL-MCNC: 6.6 G/DL
RBC # BLD: 5.59 M/UL
RBC # FLD: 13.8 %
SODIUM SERPL-SCNC: 138 MMOL/L
TRIGL SERPL-MCNC: 101 MG/DL
TSH SERPL-ACNC: 1.83 UIU/ML
WBC # FLD AUTO: 5.12 K/UL

## 2023-09-18 ENCOUNTER — APPOINTMENT (OUTPATIENT)
Dept: UROLOGY | Facility: CLINIC | Age: 68
End: 2023-09-18
Payer: MEDICARE

## 2023-09-18 ENCOUNTER — OUTPATIENT (OUTPATIENT)
Dept: OUTPATIENT SERVICES | Facility: HOSPITAL | Age: 68
LOS: 1 days | End: 2023-09-18
Payer: MEDICARE

## 2023-09-18 VITALS — DIASTOLIC BLOOD PRESSURE: 87 MMHG | HEART RATE: 50 BPM | SYSTOLIC BLOOD PRESSURE: 143 MMHG

## 2023-09-18 VITALS — HEART RATE: 87 BPM | SYSTOLIC BLOOD PRESSURE: 136 MMHG | DIASTOLIC BLOOD PRESSURE: 87 MMHG

## 2023-09-18 VITALS — DIASTOLIC BLOOD PRESSURE: 87 MMHG | HEART RATE: 73 BPM | SYSTOLIC BLOOD PRESSURE: 153 MMHG

## 2023-09-18 DIAGNOSIS — R35.0 FREQUENCY OF MICTURITION: ICD-10-CM

## 2023-09-18 DIAGNOSIS — Z98.89 OTHER SPECIFIED POSTPROCEDURAL STATES: Chronic | ICD-10-CM

## 2023-09-18 PROCEDURE — 51700 IRRIGATION OF BLADDER: CPT

## 2023-09-19 DIAGNOSIS — R39.82 CHRONIC BLADDER PAIN: ICD-10-CM

## 2023-09-19 DIAGNOSIS — R35.1 NOCTURIA: ICD-10-CM

## 2023-09-27 ENCOUNTER — APPOINTMENT (OUTPATIENT)
Dept: UROLOGY | Facility: CLINIC | Age: 68
End: 2023-09-27
Payer: MEDICARE

## 2023-09-27 ENCOUNTER — OUTPATIENT (OUTPATIENT)
Dept: OUTPATIENT SERVICES | Facility: HOSPITAL | Age: 68
LOS: 1 days | End: 2023-09-27
Payer: MEDICARE

## 2023-09-27 VITALS — SYSTOLIC BLOOD PRESSURE: 132 MMHG | HEART RATE: 52 BPM | RESPIRATION RATE: 17 BRPM | DIASTOLIC BLOOD PRESSURE: 79 MMHG

## 2023-09-27 DIAGNOSIS — R35.0 FREQUENCY OF MICTURITION: ICD-10-CM

## 2023-09-27 DIAGNOSIS — Z98.89 OTHER SPECIFIED POSTPROCEDURAL STATES: Chronic | ICD-10-CM

## 2023-09-27 PROCEDURE — 51700 IRRIGATION OF BLADDER: CPT

## 2023-09-29 DIAGNOSIS — R39.82 CHRONIC BLADDER PAIN: ICD-10-CM

## 2023-10-04 ENCOUNTER — OUTPATIENT (OUTPATIENT)
Dept: OUTPATIENT SERVICES | Facility: HOSPITAL | Age: 68
LOS: 1 days | End: 2023-10-04
Payer: MEDICARE

## 2023-10-04 ENCOUNTER — APPOINTMENT (OUTPATIENT)
Dept: UROLOGY | Facility: CLINIC | Age: 68
End: 2023-10-04
Payer: MEDICARE

## 2023-10-04 VITALS — SYSTOLIC BLOOD PRESSURE: 123 MMHG | DIASTOLIC BLOOD PRESSURE: 82 MMHG | HEART RATE: 44 BPM

## 2023-10-04 DIAGNOSIS — R35.0 FREQUENCY OF MICTURITION: ICD-10-CM

## 2023-10-04 DIAGNOSIS — Z98.89 OTHER SPECIFIED POSTPROCEDURAL STATES: Chronic | ICD-10-CM

## 2023-10-04 PROCEDURE — 51700 IRRIGATION OF BLADDER: CPT

## 2023-10-04 RX ORDER — METOPROLOL SUCCINATE 25 MG/1
25 TABLET, EXTENDED RELEASE ORAL
Qty: 90 | Refills: 0 | Status: DISCONTINUED | COMMUNITY
Start: 2022-11-22 | End: 2023-10-04

## 2023-10-04 RX ORDER — DUPILUMAB 300 MG/2ML
300 INJECTION, SOLUTION SUBCUTANEOUS
Refills: 0 | Status: ACTIVE | COMMUNITY
Start: 2023-10-04

## 2023-10-04 RX ORDER — BUTALBITAL, ACETAMINOPHEN AND CAFFEINE 325; 50; 40 MG/1; MG/1; MG/1
50-325-40 TABLET ORAL
Qty: 12 | Refills: 0 | Status: DISCONTINUED | COMMUNITY
Start: 2023-01-23 | End: 2023-10-04

## 2023-10-05 DIAGNOSIS — R39.82 CHRONIC BLADDER PAIN: ICD-10-CM

## 2023-10-05 DIAGNOSIS — R39.15 URGENCY OF URINATION: ICD-10-CM

## 2023-10-09 ENCOUNTER — APPOINTMENT (OUTPATIENT)
Dept: PULMONOLOGY | Facility: CLINIC | Age: 68
End: 2023-10-09
Payer: MEDICARE

## 2023-10-09 VITALS
HEIGHT: 65 IN | BODY MASS INDEX: 27.72 KG/M2 | WEIGHT: 166.38 LBS | DIASTOLIC BLOOD PRESSURE: 72 MMHG | RESPIRATION RATE: 16 BRPM | OXYGEN SATURATION: 97 % | HEART RATE: 57 BPM | TEMPERATURE: 97.8 F | SYSTOLIC BLOOD PRESSURE: 120 MMHG

## 2023-10-09 PROCEDURE — 94010 BREATHING CAPACITY TEST: CPT

## 2023-10-09 PROCEDURE — 94727 GAS DIL/WSHOT DETER LNG VOL: CPT

## 2023-10-09 PROCEDURE — 94729 DIFFUSING CAPACITY: CPT

## 2023-10-09 PROCEDURE — 95012 NITRIC OXIDE EXP GAS DETER: CPT

## 2023-10-09 PROCEDURE — ZZZZZ: CPT

## 2023-10-09 PROCEDURE — 99214 OFFICE O/P EST MOD 30 MIN: CPT | Mod: 25

## 2023-10-11 ENCOUNTER — APPOINTMENT (OUTPATIENT)
Dept: UROLOGY | Facility: CLINIC | Age: 68
End: 2023-10-11
Payer: MEDICARE

## 2023-10-11 ENCOUNTER — OUTPATIENT (OUTPATIENT)
Dept: OUTPATIENT SERVICES | Facility: HOSPITAL | Age: 68
LOS: 1 days | End: 2023-10-11
Payer: MEDICARE

## 2023-10-11 VITALS
RESPIRATION RATE: 16 BRPM | OXYGEN SATURATION: 95 % | DIASTOLIC BLOOD PRESSURE: 82 MMHG | HEART RATE: 59 BPM | SYSTOLIC BLOOD PRESSURE: 135 MMHG

## 2023-10-11 VITALS
OXYGEN SATURATION: 96 % | SYSTOLIC BLOOD PRESSURE: 131 MMHG | DIASTOLIC BLOOD PRESSURE: 81 MMHG | RESPIRATION RATE: 16 BRPM | HEART RATE: 56 BPM

## 2023-10-11 DIAGNOSIS — R35.0 FREQUENCY OF MICTURITION: ICD-10-CM

## 2023-10-11 DIAGNOSIS — Z98.89 OTHER SPECIFIED POSTPROCEDURAL STATES: Chronic | ICD-10-CM

## 2023-10-11 PROCEDURE — 51700 IRRIGATION OF BLADDER: CPT

## 2023-10-12 DIAGNOSIS — R39.82 CHRONIC BLADDER PAIN: ICD-10-CM

## 2023-10-18 ENCOUNTER — APPOINTMENT (OUTPATIENT)
Dept: UROLOGY | Facility: CLINIC | Age: 68
End: 2023-10-18
Payer: MEDICARE

## 2023-10-18 ENCOUNTER — OUTPATIENT (OUTPATIENT)
Dept: OUTPATIENT SERVICES | Facility: HOSPITAL | Age: 68
LOS: 1 days | End: 2023-10-18
Payer: MEDICARE

## 2023-10-18 VITALS
RESPIRATION RATE: 16 BRPM | SYSTOLIC BLOOD PRESSURE: 157 MMHG | DIASTOLIC BLOOD PRESSURE: 85 MMHG | HEART RATE: 88 BPM | OXYGEN SATURATION: 95 %

## 2023-10-18 VITALS
OXYGEN SATURATION: 94 % | HEART RATE: 75 BPM | DIASTOLIC BLOOD PRESSURE: 80 MMHG | SYSTOLIC BLOOD PRESSURE: 160 MMHG | RESPIRATION RATE: 16 BRPM

## 2023-10-18 DIAGNOSIS — R35.0 FREQUENCY OF MICTURITION: ICD-10-CM

## 2023-10-18 DIAGNOSIS — Z98.89 OTHER SPECIFIED POSTPROCEDURAL STATES: Chronic | ICD-10-CM

## 2023-10-18 PROCEDURE — 99213 OFFICE O/P EST LOW 20 MIN: CPT | Mod: 25

## 2023-10-18 PROCEDURE — 51700 IRRIGATION OF BLADDER: CPT

## 2023-10-18 RX ORDER — DIAZEPAM 100 %
POWDER (GRAM) MISCELLANEOUS
Qty: 30 | Refills: 3 | Status: ACTIVE | COMMUNITY
Start: 2023-10-18 | End: 1900-01-01

## 2023-10-19 DIAGNOSIS — R35.1 NOCTURIA: ICD-10-CM

## 2023-10-19 DIAGNOSIS — R39.82 CHRONIC BLADDER PAIN: ICD-10-CM

## 2023-10-19 DIAGNOSIS — R39.15 URGENCY OF URINATION: ICD-10-CM

## 2023-10-26 ENCOUNTER — APPOINTMENT (OUTPATIENT)
Dept: UROLOGY | Facility: CLINIC | Age: 68
End: 2023-10-26
Payer: MEDICARE

## 2023-10-26 ENCOUNTER — OUTPATIENT (OUTPATIENT)
Dept: OUTPATIENT SERVICES | Facility: HOSPITAL | Age: 68
LOS: 1 days | End: 2023-10-26
Payer: MEDICARE

## 2023-10-26 VITALS — HEART RATE: 100 BPM | DIASTOLIC BLOOD PRESSURE: 78 MMHG | SYSTOLIC BLOOD PRESSURE: 116 MMHG

## 2023-10-26 VITALS — DIASTOLIC BLOOD PRESSURE: 86 MMHG | SYSTOLIC BLOOD PRESSURE: 120 MMHG | HEART RATE: 50 BPM

## 2023-10-26 DIAGNOSIS — R35.0 FREQUENCY OF MICTURITION: ICD-10-CM

## 2023-10-26 PROCEDURE — 51700 IRRIGATION OF BLADDER: CPT

## 2023-10-31 DIAGNOSIS — R39.15 URGENCY OF URINATION: ICD-10-CM

## 2023-10-31 DIAGNOSIS — R39.82 CHRONIC BLADDER PAIN: ICD-10-CM

## 2023-11-02 ENCOUNTER — APPOINTMENT (OUTPATIENT)
Dept: UROLOGY | Facility: CLINIC | Age: 68
End: 2023-11-02
Payer: MEDICARE

## 2023-11-02 ENCOUNTER — OUTPATIENT (OUTPATIENT)
Dept: OUTPATIENT SERVICES | Facility: HOSPITAL | Age: 68
LOS: 1 days | End: 2023-11-02
Payer: MEDICARE

## 2023-11-02 VITALS
DIASTOLIC BLOOD PRESSURE: 86 MMHG | RESPIRATION RATE: 16 BRPM | SYSTOLIC BLOOD PRESSURE: 133 MMHG | TEMPERATURE: 97.2 F | HEART RATE: 52 BPM

## 2023-11-02 VITALS — HEART RATE: 58 BPM | SYSTOLIC BLOOD PRESSURE: 131 MMHG | DIASTOLIC BLOOD PRESSURE: 79 MMHG

## 2023-11-02 DIAGNOSIS — R35.0 FREQUENCY OF MICTURITION: ICD-10-CM

## 2023-11-02 DIAGNOSIS — Z98.89 OTHER SPECIFIED POSTPROCEDURAL STATES: Chronic | ICD-10-CM

## 2023-11-02 PROCEDURE — 51700 IRRIGATION OF BLADDER: CPT

## 2023-11-08 DIAGNOSIS — R39.82 CHRONIC BLADDER PAIN: ICD-10-CM

## 2023-11-09 ENCOUNTER — APPOINTMENT (OUTPATIENT)
Dept: UROLOGY | Facility: CLINIC | Age: 68
End: 2023-11-09
Payer: MEDICARE

## 2023-11-09 ENCOUNTER — OUTPATIENT (OUTPATIENT)
Dept: OUTPATIENT SERVICES | Facility: HOSPITAL | Age: 68
LOS: 1 days | End: 2023-11-09
Payer: MEDICARE

## 2023-11-09 VITALS
TEMPERATURE: 98 F | DIASTOLIC BLOOD PRESSURE: 74 MMHG | SYSTOLIC BLOOD PRESSURE: 154 MMHG | RESPIRATION RATE: 17 BRPM | HEART RATE: 51 BPM

## 2023-11-09 DIAGNOSIS — Z98.89 OTHER SPECIFIED POSTPROCEDURAL STATES: Chronic | ICD-10-CM

## 2023-11-09 DIAGNOSIS — R35.0 FREQUENCY OF MICTURITION: ICD-10-CM

## 2023-11-09 PROCEDURE — 51700 IRRIGATION OF BLADDER: CPT

## 2023-11-10 DIAGNOSIS — R35.1 NOCTURIA: ICD-10-CM

## 2023-11-10 DIAGNOSIS — R39.82 CHRONIC BLADDER PAIN: ICD-10-CM

## 2023-11-16 ENCOUNTER — APPOINTMENT (OUTPATIENT)
Dept: UROLOGY | Facility: CLINIC | Age: 68
End: 2023-11-16

## 2023-11-16 ENCOUNTER — OUTPATIENT (OUTPATIENT)
Dept: OUTPATIENT SERVICES | Facility: HOSPITAL | Age: 68
LOS: 1 days | End: 2023-11-16
Payer: MEDICARE

## 2023-11-16 ENCOUNTER — APPOINTMENT (OUTPATIENT)
Dept: UROLOGY | Facility: CLINIC | Age: 68
End: 2023-11-16
Payer: MEDICARE

## 2023-11-16 ENCOUNTER — EMERGENCY (EMERGENCY)
Facility: HOSPITAL | Age: 68
LOS: 1 days | Discharge: HOME | End: 2023-11-16
Attending: EMERGENCY MEDICINE
Payer: MEDICARE

## 2023-11-16 VITALS — HEART RATE: 77 BPM | OXYGEN SATURATION: 95 % | SYSTOLIC BLOOD PRESSURE: 140 MMHG | DIASTOLIC BLOOD PRESSURE: 77 MMHG

## 2023-11-16 VITALS — DIASTOLIC BLOOD PRESSURE: 82 MMHG | SYSTOLIC BLOOD PRESSURE: 138 MMHG | HEART RATE: 62 BPM

## 2023-11-16 VITALS
WEIGHT: 164.91 LBS | SYSTOLIC BLOOD PRESSURE: 142 MMHG | HEIGHT: 65 IN | RESPIRATION RATE: 18 BRPM | HEART RATE: 75 BPM | OXYGEN SATURATION: 94 % | TEMPERATURE: 99 F | DIASTOLIC BLOOD PRESSURE: 78 MMHG

## 2023-11-16 DIAGNOSIS — Z98.89 OTHER SPECIFIED POSTPROCEDURAL STATES: Chronic | ICD-10-CM

## 2023-11-16 DIAGNOSIS — R35.0 FREQUENCY OF MICTURITION: ICD-10-CM

## 2023-11-16 LAB
ALBUMIN SERPL ELPH-MCNC: 4.4 G/DL — SIGNIFICANT CHANGE UP (ref 3.3–5)
ALBUMIN SERPL ELPH-MCNC: 4.4 G/DL — SIGNIFICANT CHANGE UP (ref 3.3–5)
ALP SERPL-CCNC: 61 U/L — SIGNIFICANT CHANGE UP (ref 40–120)
ALP SERPL-CCNC: 61 U/L — SIGNIFICANT CHANGE UP (ref 40–120)
ALT FLD-CCNC: 22 U/L — SIGNIFICANT CHANGE UP (ref 10–45)
ALT FLD-CCNC: 22 U/L — SIGNIFICANT CHANGE UP (ref 10–45)
ANION GAP SERPL CALC-SCNC: 15 MMOL/L — SIGNIFICANT CHANGE UP (ref 5–17)
ANION GAP SERPL CALC-SCNC: 15 MMOL/L — SIGNIFICANT CHANGE UP (ref 5–17)
AST SERPL-CCNC: 28 U/L — SIGNIFICANT CHANGE UP (ref 10–40)
AST SERPL-CCNC: 28 U/L — SIGNIFICANT CHANGE UP (ref 10–40)
BASOPHILS # BLD AUTO: 0.04 K/UL — SIGNIFICANT CHANGE UP (ref 0–0.2)
BASOPHILS # BLD AUTO: 0.04 K/UL — SIGNIFICANT CHANGE UP (ref 0–0.2)
BASOPHILS NFR BLD AUTO: 0.7 % — SIGNIFICANT CHANGE UP (ref 0–2)
BASOPHILS NFR BLD AUTO: 0.7 % — SIGNIFICANT CHANGE UP (ref 0–2)
BILIRUB SERPL-MCNC: 0.7 MG/DL — SIGNIFICANT CHANGE UP (ref 0.2–1.2)
BILIRUB SERPL-MCNC: 0.7 MG/DL — SIGNIFICANT CHANGE UP (ref 0.2–1.2)
BUN SERPL-MCNC: 23 MG/DL — SIGNIFICANT CHANGE UP (ref 7–23)
BUN SERPL-MCNC: 23 MG/DL — SIGNIFICANT CHANGE UP (ref 7–23)
CALCIUM SERPL-MCNC: 9.2 MG/DL — SIGNIFICANT CHANGE UP (ref 8.4–10.5)
CALCIUM SERPL-MCNC: 9.2 MG/DL — SIGNIFICANT CHANGE UP (ref 8.4–10.5)
CHLORIDE SERPL-SCNC: 101 MMOL/L — SIGNIFICANT CHANGE UP (ref 96–108)
CHLORIDE SERPL-SCNC: 101 MMOL/L — SIGNIFICANT CHANGE UP (ref 96–108)
CO2 SERPL-SCNC: 23 MMOL/L — SIGNIFICANT CHANGE UP (ref 22–31)
CO2 SERPL-SCNC: 23 MMOL/L — SIGNIFICANT CHANGE UP (ref 22–31)
CREAT SERPL-MCNC: 1.02 MG/DL — SIGNIFICANT CHANGE UP (ref 0.5–1.3)
CREAT SERPL-MCNC: 1.02 MG/DL — SIGNIFICANT CHANGE UP (ref 0.5–1.3)
EGFR: 80 ML/MIN/1.73M2 — SIGNIFICANT CHANGE UP
EGFR: 80 ML/MIN/1.73M2 — SIGNIFICANT CHANGE UP
EOSINOPHIL # BLD AUTO: 0.1 K/UL — SIGNIFICANT CHANGE UP (ref 0–0.5)
EOSINOPHIL # BLD AUTO: 0.1 K/UL — SIGNIFICANT CHANGE UP (ref 0–0.5)
EOSINOPHIL NFR BLD AUTO: 1.7 % — SIGNIFICANT CHANGE UP (ref 0–6)
EOSINOPHIL NFR BLD AUTO: 1.7 % — SIGNIFICANT CHANGE UP (ref 0–6)
GLUCOSE SERPL-MCNC: 106 MG/DL — HIGH (ref 70–99)
GLUCOSE SERPL-MCNC: 106 MG/DL — HIGH (ref 70–99)
HCT VFR BLD CALC: 50.5 % — HIGH (ref 39–50)
HCT VFR BLD CALC: 50.5 % — HIGH (ref 39–50)
HGB BLD-MCNC: 17 G/DL — SIGNIFICANT CHANGE UP (ref 13–17)
HGB BLD-MCNC: 17 G/DL — SIGNIFICANT CHANGE UP (ref 13–17)
IMM GRANULOCYTES NFR BLD AUTO: 0.2 % — SIGNIFICANT CHANGE UP (ref 0–0.9)
IMM GRANULOCYTES NFR BLD AUTO: 0.2 % — SIGNIFICANT CHANGE UP (ref 0–0.9)
LIDOCAIN IGE QN: 24 U/L — SIGNIFICANT CHANGE UP (ref 7–60)
LIDOCAIN IGE QN: 24 U/L — SIGNIFICANT CHANGE UP (ref 7–60)
LYMPHOCYTES # BLD AUTO: 0.89 K/UL — LOW (ref 1–3.3)
LYMPHOCYTES # BLD AUTO: 0.89 K/UL — LOW (ref 1–3.3)
LYMPHOCYTES # BLD AUTO: 15 % — SIGNIFICANT CHANGE UP (ref 13–44)
LYMPHOCYTES # BLD AUTO: 15 % — SIGNIFICANT CHANGE UP (ref 13–44)
MAGNESIUM SERPL-MCNC: 2 MG/DL — SIGNIFICANT CHANGE UP (ref 1.6–2.6)
MAGNESIUM SERPL-MCNC: 2 MG/DL — SIGNIFICANT CHANGE UP (ref 1.6–2.6)
MCHC RBC-ENTMCNC: 31.5 PG — SIGNIFICANT CHANGE UP (ref 27–34)
MCHC RBC-ENTMCNC: 31.5 PG — SIGNIFICANT CHANGE UP (ref 27–34)
MCHC RBC-ENTMCNC: 33.7 GM/DL — SIGNIFICANT CHANGE UP (ref 32–36)
MCHC RBC-ENTMCNC: 33.7 GM/DL — SIGNIFICANT CHANGE UP (ref 32–36)
MCV RBC AUTO: 93.5 FL — SIGNIFICANT CHANGE UP (ref 80–100)
MCV RBC AUTO: 93.5 FL — SIGNIFICANT CHANGE UP (ref 80–100)
MONOCYTES # BLD AUTO: 0.42 K/UL — SIGNIFICANT CHANGE UP (ref 0–0.9)
MONOCYTES # BLD AUTO: 0.42 K/UL — SIGNIFICANT CHANGE UP (ref 0–0.9)
MONOCYTES NFR BLD AUTO: 7.1 % — SIGNIFICANT CHANGE UP (ref 2–14)
MONOCYTES NFR BLD AUTO: 7.1 % — SIGNIFICANT CHANGE UP (ref 2–14)
NEUTROPHILS # BLD AUTO: 4.49 K/UL — SIGNIFICANT CHANGE UP (ref 1.8–7.4)
NEUTROPHILS # BLD AUTO: 4.49 K/UL — SIGNIFICANT CHANGE UP (ref 1.8–7.4)
NEUTROPHILS NFR BLD AUTO: 75.3 % — SIGNIFICANT CHANGE UP (ref 43–77)
NEUTROPHILS NFR BLD AUTO: 75.3 % — SIGNIFICANT CHANGE UP (ref 43–77)
NRBC # BLD: 0 /100 WBCS — SIGNIFICANT CHANGE UP (ref 0–0)
NRBC # BLD: 0 /100 WBCS — SIGNIFICANT CHANGE UP (ref 0–0)
NT-PROBNP SERPL-SCNC: 88 PG/ML — SIGNIFICANT CHANGE UP (ref 0–300)
NT-PROBNP SERPL-SCNC: 88 PG/ML — SIGNIFICANT CHANGE UP (ref 0–300)
PLATELET # BLD AUTO: 171 K/UL — SIGNIFICANT CHANGE UP (ref 150–400)
PLATELET # BLD AUTO: 171 K/UL — SIGNIFICANT CHANGE UP (ref 150–400)
POTASSIUM SERPL-MCNC: 4.3 MMOL/L — SIGNIFICANT CHANGE UP (ref 3.5–5.3)
POTASSIUM SERPL-MCNC: 4.3 MMOL/L — SIGNIFICANT CHANGE UP (ref 3.5–5.3)
POTASSIUM SERPL-SCNC: 4.3 MMOL/L — SIGNIFICANT CHANGE UP (ref 3.5–5.3)
POTASSIUM SERPL-SCNC: 4.3 MMOL/L — SIGNIFICANT CHANGE UP (ref 3.5–5.3)
PROT SERPL-MCNC: 6.6 G/DL — SIGNIFICANT CHANGE UP (ref 6–8.3)
PROT SERPL-MCNC: 6.6 G/DL — SIGNIFICANT CHANGE UP (ref 6–8.3)
RBC # BLD: 5.4 M/UL — SIGNIFICANT CHANGE UP (ref 4.2–5.8)
RBC # BLD: 5.4 M/UL — SIGNIFICANT CHANGE UP (ref 4.2–5.8)
RBC # FLD: 12.7 % — SIGNIFICANT CHANGE UP (ref 10.3–14.5)
RBC # FLD: 12.7 % — SIGNIFICANT CHANGE UP (ref 10.3–14.5)
SODIUM SERPL-SCNC: 139 MMOL/L — SIGNIFICANT CHANGE UP (ref 135–145)
SODIUM SERPL-SCNC: 139 MMOL/L — SIGNIFICANT CHANGE UP (ref 135–145)
TROPONIN T, HIGH SENSITIVITY RESULT: 24 NG/L — SIGNIFICANT CHANGE UP (ref 0–51)
WBC # BLD: 5.95 K/UL — SIGNIFICANT CHANGE UP (ref 3.8–10.5)
WBC # BLD: 5.95 K/UL — SIGNIFICANT CHANGE UP (ref 3.8–10.5)
WBC # FLD AUTO: 5.95 K/UL — SIGNIFICANT CHANGE UP (ref 3.8–10.5)
WBC # FLD AUTO: 5.95 K/UL — SIGNIFICANT CHANGE UP (ref 3.8–10.5)

## 2023-11-16 PROCEDURE — 71046 X-RAY EXAM CHEST 2 VIEWS: CPT

## 2023-11-16 PROCEDURE — 51700 IRRIGATION OF BLADDER: CPT

## 2023-11-16 PROCEDURE — 83735 ASSAY OF MAGNESIUM: CPT

## 2023-11-16 PROCEDURE — 99285 EMERGENCY DEPT VISIT HI MDM: CPT | Mod: GC

## 2023-11-16 PROCEDURE — 80053 COMPREHEN METABOLIC PANEL: CPT

## 2023-11-16 PROCEDURE — 83880 ASSAY OF NATRIURETIC PEPTIDE: CPT

## 2023-11-16 PROCEDURE — 36415 COLL VENOUS BLD VENIPUNCTURE: CPT

## 2023-11-16 PROCEDURE — 71046 X-RAY EXAM CHEST 2 VIEWS: CPT | Mod: 26

## 2023-11-16 PROCEDURE — 93005 ELECTROCARDIOGRAM TRACING: CPT

## 2023-11-16 PROCEDURE — 84484 ASSAY OF TROPONIN QUANT: CPT

## 2023-11-16 PROCEDURE — 85025 COMPLETE CBC W/AUTO DIFF WBC: CPT

## 2023-11-16 PROCEDURE — 83690 ASSAY OF LIPASE: CPT

## 2023-11-16 PROCEDURE — 99285 EMERGENCY DEPT VISIT HI MDM: CPT | Mod: 25

## 2023-11-16 RX ORDER — ACETAMINOPHEN 500 MG
650 TABLET ORAL ONCE
Refills: 0 | Status: COMPLETED | OUTPATIENT
Start: 2023-11-16 | End: 2023-11-16

## 2023-11-16 RX ORDER — METHOCARBAMOL 500 MG/1
1500 TABLET, FILM COATED ORAL ONCE
Refills: 0 | Status: COMPLETED | OUTPATIENT
Start: 2023-11-16 | End: 2023-11-16

## 2023-11-16 RX ORDER — LIDOCAINE 4 G/100G
1 CREAM TOPICAL ONCE
Refills: 0 | Status: COMPLETED | OUTPATIENT
Start: 2023-11-16 | End: 2023-11-16

## 2023-11-16 RX ADMIN — Medication 650 MILLIGRAM(S): at 17:25

## 2023-11-16 RX ADMIN — Medication 650 MILLIGRAM(S): at 13:35

## 2023-11-16 RX ADMIN — LIDOCAINE 1 PATCH: 4 CREAM TOPICAL at 13:34

## 2023-11-16 NOTE — ED PROVIDER NOTE - NSFOLLOWUPINSTRUCTIONS_ED_ALL_ED_FT
Please follow up with your primary care physician within 2-3 days.   Return to the ER for any new or concerning symptoms.   You may take 975 mg acetaminophen every 6 hours as needed for pain. You may take 600mg Ibuprofen (Advil) once every 8 hours as needed for pain. See medication label for warnings and use instructions.     Please follow up with your orthopedic doctor within 1 week. You may need additional testing.     Apply heat to your shoulder to help with the pain.     We performed lab work and an EKG which were normal. We also performed an XRAY which was normal.     If you develop recurrent chest pain, trouble breathing, palpitations, passing out please return to the ER.

## 2023-11-16 NOTE — ED PROVIDER NOTE - PHYSICAL EXAMINATION
General: well-appearing, no acute distress  Head: Atraumatic, normocephalic  Eyes: EOM grossly in tact, no scleral icterus, no discharge  ENT: moist mucous membranes  Neurology: A&Ox 3, nonfocal, PAEZ x 4  Respiratory: CTAB, no wheezing, normal respiratory effort  CV: RRR, good s1/s2, no S3, Extremities warm and well perfused  Abdominal: Soft, non-distended, non-tender, no masses  Extremities: No edema, no deformities  L trapezius ttp, L + Demarco's test for supraspinatus  Skin: warm and dry. No rashes

## 2023-11-16 NOTE — ED PROVIDER NOTE - CLINICAL SUMMARY MEDICAL DECISION MAKING FREE TEXT BOX
68 yr old male with PMHx of HTN, HLD, NIKKY( does not use CPAP) CAD (Stents), RBBB presents with L sided chest pain x 1.5d with some radiation to his L arm. Differential diagnosis includes but is not limited to rotator cuff injury, ACS, costochondritis, muscle strain. Given known cardiac hx would get labs, ecg, cxr. will tx the likely MSK pain and reassess. if labs unremarkable would consider pain control/pmd f/u.

## 2023-11-16 NOTE — ED PROVIDER NOTE - OBJECTIVE STATEMENT
68 yr old male with PMHx of HTN, HLD, NIKKY( does not use CPAP) CAD (Stents), RBBB presents with L sided chest pain x 1.5d with some radiation to his L arm. He has some associated dyspnea but denies an exertional component. Pt had to lift his mom from a wheelchair to a bed 2d ago after her discharge from rehab and thinks the pain may be related. Some associated nausea. No fever, chills, vomiting, diarrhea, abd pain, headache, sore throat, cough. Pt has not taken any medications for this problem.

## 2023-11-16 NOTE — ED ADULT NURSE NOTE - OBJECTIVE STATEMENT
68 yr old male with 4 stents nothing recent came in with chest pain and left arm pain for the last few days. no sob. states he has been under a lot of stress as his mother is dying. on assessment a and o x 3 lungs clear bad soft non tender no swelling in extremities vitals stable ekg ok. no fevers, no n/v/d no other complaints. h/o sacrcodosis

## 2023-11-16 NOTE — ED PROVIDER NOTE - ATTENDING CONTRIBUTION TO CARE
Attending MD Kraft: I personally have seen and examined this patient.  Resident note reviewed and agree on plan of care and except where noted.  See below for details.     seen in [facility]    68M with PMH/PSH including HTN, HLD, NIKKY noncompliant with CPAP, CAD s/p stents, known RBBB presents to the ED with L sided chest pain with associated LUE pain for 1-2 days. Reports associated with shortness of breath, denies worsening with exertion.  Reports pain is localized to L upper lateral chest, near axilla.  Reports has been helping move mother since she came home from rehab (for example from wheelchair to bed).  Reports attributes pain to LUE to that.  Denies taking analgesia.  Denies fevers, URI symptoms, recent illness, abdominal pain, nausea, vomiting, diarrhea, urinary complaints.     Exam:   General: NAD  HENT: head NCAT, airway patent  Eyes: anicteric, no conjunctival injection   Lungs: lungs CTAB with good inspiratory effort, no wheezing, no rhonchi, no rales  Cardiac: +S1S2, no obvious m/r/g  GI: abdomen soft with +BS, NT, ND  : no CVAT  MSK: ranging neck and extremities freely, +mild tenderness to palpation to L trap extending to L shoulder, no erythema, no increased warmth, no ecchymosis  Neuro: moving all extremities spontaneously, nonfocal, no saddle anesthesia  Psych: normal mood and affect     A/P: 68M with L upper chest and LUE pain, suspect MSK but given history of cardiac disease, will work up for ACS, will keep on monitor, will obtain EKG, CXR, will give analgesia

## 2023-11-16 NOTE — ED PROVIDER NOTE - NSICDXPASTSURGICALHX_GEN_ALL_CORE_FT
PAST SURGICAL HISTORY:  H/O cardiac catheterization     S/P colonoscopy     S/P endoscopy     S/P primary angioplasty with coronary stent 2 stents

## 2023-11-16 NOTE — ED PROVIDER NOTE - PROGRESS NOTE DETAILS
Bean, PGY-3, EM:  Discussed with pt results of work up, strict return precautions, and need for follow up.  Pt expressed understanding and agrees with plan. discussed pain management and plan to follow up with ortho if persistent pain.

## 2023-11-16 NOTE — ED PROVIDER NOTE - PATIENT PORTAL LINK FT
You can access the FollowMyHealth Patient Portal offered by [facility] by registering at the following website: [email or web address]. By joining [employer]’s FollowMyHealth portal, you will also be able to view your health information using other applications (apps) compatible with our system.

## 2023-11-16 NOTE — ED PROVIDER NOTE - NSICDXPASTMEDICALHX_GEN_ALL_CORE_FT
PAST MEDICAL HISTORY:  BPH (Benign Prostatic Hyperplasia)     CAD (coronary artery disease) stents x2 1995    GI bleed abdominal pain and bloody diarrhea which resolved on its own no relation to antibiotics Colonoscopy did not reveal abnormality.  Pt states occurred two times over 5 years ago.    H/O hyperlipidemia     HTN - Hypertension     Myalgia     NSVT (nonsustained ventricular tachycardia) found on holter monitor    Sarcoidosis     Sleep apnea diagnosed by sleep study - unable to tolerate CPAP    Stomach ulcer

## 2023-11-17 DIAGNOSIS — R39.15 URGENCY OF URINATION: ICD-10-CM

## 2023-11-22 ENCOUNTER — APPOINTMENT (OUTPATIENT)
Dept: UROLOGY | Facility: CLINIC | Age: 68
End: 2023-11-22
Payer: MEDICARE

## 2023-11-22 ENCOUNTER — OUTPATIENT (OUTPATIENT)
Dept: OUTPATIENT SERVICES | Facility: HOSPITAL | Age: 68
LOS: 1 days | End: 2023-11-22
Payer: MEDICARE

## 2023-11-22 ENCOUNTER — APPOINTMENT (OUTPATIENT)
Dept: UROLOGY | Facility: CLINIC | Age: 68
End: 2023-11-22

## 2023-11-22 VITALS — DIASTOLIC BLOOD PRESSURE: 85 MMHG | SYSTOLIC BLOOD PRESSURE: 135 MMHG | OXYGEN SATURATION: 99 % | HEART RATE: 78 BPM

## 2023-11-22 DIAGNOSIS — N20.0 CALCULUS OF KIDNEY: ICD-10-CM

## 2023-11-22 DIAGNOSIS — R39.82 CHRONIC BLADDER PAIN: ICD-10-CM

## 2023-11-22 DIAGNOSIS — R39.15 URGENCY OF URINATION: ICD-10-CM

## 2023-11-22 DIAGNOSIS — Z98.89 OTHER SPECIFIED POSTPROCEDURAL STATES: Chronic | ICD-10-CM

## 2023-11-22 DIAGNOSIS — R35.0 FREQUENCY OF MICTURITION: ICD-10-CM

## 2023-11-22 PROCEDURE — 51700 IRRIGATION OF BLADDER: CPT

## 2023-11-24 ENCOUNTER — NON-APPOINTMENT (OUTPATIENT)
Age: 68
End: 2023-11-24

## 2023-11-29 ENCOUNTER — APPOINTMENT (OUTPATIENT)
Dept: UROLOGY | Facility: CLINIC | Age: 68
End: 2023-11-29
Payer: MEDICARE

## 2023-11-29 ENCOUNTER — OUTPATIENT (OUTPATIENT)
Dept: OUTPATIENT SERVICES | Facility: HOSPITAL | Age: 68
LOS: 1 days | End: 2023-11-29
Payer: MEDICARE

## 2023-11-29 VITALS — HEART RATE: 55 BPM | DIASTOLIC BLOOD PRESSURE: 95 MMHG | SYSTOLIC BLOOD PRESSURE: 153 MMHG | OXYGEN SATURATION: 95 %

## 2023-11-29 VITALS — HEART RATE: 55 BPM | SYSTOLIC BLOOD PRESSURE: 146 MMHG | DIASTOLIC BLOOD PRESSURE: 95 MMHG

## 2023-11-29 DIAGNOSIS — R35.0 FREQUENCY OF MICTURITION: ICD-10-CM

## 2023-11-29 DIAGNOSIS — R35.1 NOCTURIA: ICD-10-CM

## 2023-11-29 DIAGNOSIS — Z98.89 OTHER SPECIFIED POSTPROCEDURAL STATES: Chronic | ICD-10-CM

## 2023-11-29 PROCEDURE — 51700 IRRIGATION OF BLADDER: CPT

## 2023-11-30 ENCOUNTER — APPOINTMENT (OUTPATIENT)
Dept: UROLOGY | Facility: CLINIC | Age: 68
End: 2023-11-30

## 2023-11-30 DIAGNOSIS — R39.82 CHRONIC BLADDER PAIN: ICD-10-CM

## 2023-11-30 DIAGNOSIS — R35.1 NOCTURIA: ICD-10-CM

## 2023-12-01 ENCOUNTER — APPOINTMENT (OUTPATIENT)
Dept: UROLOGY | Facility: CLINIC | Age: 68
End: 2023-12-01

## 2023-12-04 ENCOUNTER — OUTPATIENT (OUTPATIENT)
Dept: OUTPATIENT SERVICES | Facility: HOSPITAL | Age: 68
LOS: 1 days | End: 2023-12-04
Payer: MEDICARE

## 2023-12-04 ENCOUNTER — APPOINTMENT (OUTPATIENT)
Dept: UROLOGY | Facility: CLINIC | Age: 68
End: 2023-12-04
Payer: MEDICARE

## 2023-12-04 ENCOUNTER — APPOINTMENT (OUTPATIENT)
Dept: CARDIOLOGY | Facility: CLINIC | Age: 68
End: 2023-12-04
Payer: MEDICARE

## 2023-12-04 ENCOUNTER — NON-APPOINTMENT (OUTPATIENT)
Age: 68
End: 2023-12-04

## 2023-12-04 VITALS
OXYGEN SATURATION: 97 % | BODY MASS INDEX: 27.66 KG/M2 | HEIGHT: 65 IN | WEIGHT: 166 LBS | HEART RATE: 54 BPM | DIASTOLIC BLOOD PRESSURE: 80 MMHG | SYSTOLIC BLOOD PRESSURE: 134 MMHG

## 2023-12-04 VITALS — HEART RATE: 54 BPM | DIASTOLIC BLOOD PRESSURE: 78 MMHG | SYSTOLIC BLOOD PRESSURE: 128 MMHG

## 2023-12-04 VITALS — HEART RATE: 65 BPM | SYSTOLIC BLOOD PRESSURE: 134 MMHG | DIASTOLIC BLOOD PRESSURE: 87 MMHG

## 2023-12-04 DIAGNOSIS — R35.0 FREQUENCY OF MICTURITION: ICD-10-CM

## 2023-12-04 DIAGNOSIS — N30.90 CYSTITIS, UNSPECIFIED W/OUT HEMATURIA: ICD-10-CM

## 2023-12-04 DIAGNOSIS — Z98.89 OTHER SPECIFIED POSTPROCEDURAL STATES: Chronic | ICD-10-CM

## 2023-12-04 DIAGNOSIS — N30.10 INTERSTITIAL CYSTITIS (CHRONIC) W/OUT HEMATURIA: ICD-10-CM

## 2023-12-04 PROCEDURE — 51700 IRRIGATION OF BLADDER: CPT

## 2023-12-04 PROCEDURE — 99214 OFFICE O/P EST MOD 30 MIN: CPT

## 2023-12-04 PROCEDURE — 93000 ELECTROCARDIOGRAM COMPLETE: CPT

## 2023-12-06 DIAGNOSIS — N30.10 INTERSTITIAL CYSTITIS (CHRONIC) WITHOUT HEMATURIA: ICD-10-CM

## 2023-12-06 DIAGNOSIS — N30.90 CYSTITIS, UNSPECIFIED WITHOUT HEMATURIA: ICD-10-CM

## 2023-12-11 ENCOUNTER — OUTPATIENT (OUTPATIENT)
Dept: OUTPATIENT SERVICES | Facility: HOSPITAL | Age: 68
LOS: 1 days | End: 2023-12-11
Payer: MEDICARE

## 2023-12-11 ENCOUNTER — APPOINTMENT (OUTPATIENT)
Dept: UROLOGY | Facility: CLINIC | Age: 68
End: 2023-12-11
Payer: MEDICARE

## 2023-12-11 VITALS — HEART RATE: 51 BPM | SYSTOLIC BLOOD PRESSURE: 122 MMHG | DIASTOLIC BLOOD PRESSURE: 72 MMHG

## 2023-12-11 DIAGNOSIS — N20.0 CALCULUS OF KIDNEY: ICD-10-CM

## 2023-12-11 DIAGNOSIS — Z98.89 OTHER SPECIFIED POSTPROCEDURAL STATES: Chronic | ICD-10-CM

## 2023-12-11 DIAGNOSIS — R39.82 CHRONIC BLADDER PAIN: ICD-10-CM

## 2023-12-11 DIAGNOSIS — R35.0 FREQUENCY OF MICTURITION: ICD-10-CM

## 2023-12-11 DIAGNOSIS — R39.15 URGENCY OF URINATION: ICD-10-CM

## 2023-12-11 PROCEDURE — 51700 IRRIGATION OF BLADDER: CPT

## 2023-12-11 PROCEDURE — 99213 OFFICE O/P EST LOW 20 MIN: CPT | Mod: 25

## 2023-12-12 DIAGNOSIS — N20.0 CALCULUS OF KIDNEY: ICD-10-CM

## 2023-12-12 DIAGNOSIS — R39.82 CHRONIC BLADDER PAIN: ICD-10-CM

## 2023-12-12 DIAGNOSIS — R39.15 URGENCY OF URINATION: ICD-10-CM

## 2023-12-12 LAB
APPEARANCE: CLEAR
BACTERIA UR CULT: NORMAL
BACTERIA: NEGATIVE /HPF
BILIRUBIN URINE: NEGATIVE
BLOOD URINE: NEGATIVE
CAST: 2 /LPF
COLOR: YELLOW
EPITHELIAL CELLS: 0 /HPF
GLUCOSE QUALITATIVE U: NEGATIVE MG/DL
KETONES URINE: NEGATIVE MG/DL
LEUKOCYTE ESTERASE URINE: NEGATIVE
MICROSCOPIC-UA: NORMAL
NITRITE URINE: NEGATIVE
PH URINE: 5.5
PROTEIN URINE: NEGATIVE MG/DL
RED BLOOD CELLS URINE: 1 /HPF
REVIEW: NORMAL
SPECIFIC GRAVITY URINE: 1.02
UROBILINOGEN URINE: 0.2 MG/DL
WHITE BLOOD CELLS URINE: 1 /HPF

## 2023-12-14 ENCOUNTER — OUTPATIENT (OUTPATIENT)
Dept: OUTPATIENT SERVICES | Facility: HOSPITAL | Age: 68
LOS: 1 days | End: 2023-12-14
Payer: MEDICARE

## 2023-12-14 ENCOUNTER — APPOINTMENT (OUTPATIENT)
Dept: ULTRASOUND IMAGING | Facility: CLINIC | Age: 68
End: 2023-12-14
Payer: MEDICARE

## 2023-12-14 DIAGNOSIS — N30.10 INTERSTITIAL CYSTITIS (CHRONIC) WITHOUT HEMATURIA: ICD-10-CM

## 2023-12-14 DIAGNOSIS — R35.0 FREQUENCY OF MICTURITION: ICD-10-CM

## 2023-12-14 DIAGNOSIS — R35.1 NOCTURIA: ICD-10-CM

## 2023-12-14 DIAGNOSIS — Z98.89 OTHER SPECIFIED POSTPROCEDURAL STATES: Chronic | ICD-10-CM

## 2023-12-14 DIAGNOSIS — R39.15 URGENCY OF URINATION: ICD-10-CM

## 2023-12-14 DIAGNOSIS — N20.0 CALCULUS OF KIDNEY: ICD-10-CM

## 2023-12-14 PROCEDURE — 76770 US EXAM ABDO BACK WALL COMP: CPT | Mod: 26

## 2023-12-14 PROCEDURE — 76770 US EXAM ABDO BACK WALL COMP: CPT

## 2023-12-19 ENCOUNTER — NON-APPOINTMENT (OUTPATIENT)
Age: 68
End: 2023-12-19

## 2023-12-19 ENCOUNTER — APPOINTMENT (OUTPATIENT)
Dept: CARDIOLOGY | Facility: CLINIC | Age: 68
End: 2023-12-19

## 2023-12-20 ENCOUNTER — APPOINTMENT (OUTPATIENT)
Dept: UROLOGY | Facility: CLINIC | Age: 68
End: 2023-12-20

## 2023-12-27 ENCOUNTER — APPOINTMENT (OUTPATIENT)
Dept: UROLOGY | Facility: CLINIC | Age: 68
End: 2023-12-27

## 2023-12-29 ENCOUNTER — RX RENEWAL (OUTPATIENT)
Age: 68
End: 2023-12-29

## 2024-01-03 ENCOUNTER — APPOINTMENT (OUTPATIENT)
Dept: UROLOGY | Facility: CLINIC | Age: 69
End: 2024-01-03

## 2024-01-14 ENCOUNTER — RX RENEWAL (OUTPATIENT)
Age: 69
End: 2024-01-14

## 2024-01-15 ENCOUNTER — RX RENEWAL (OUTPATIENT)
Age: 69
End: 2024-01-15

## 2024-01-17 NOTE — DISCUSSION/SUMMARY
[FreeTextEntry1] : 68 year old with coronary disease and stable sarcoidosis. ECG SB, RBBB, Lateral Q's.  His coronary disease is stable. No angina. Continue aggressive medical therapy including aspirin, and Praluent and zetia if LDL < 70 LDL was at goal 47. Bradycardia on ECG is unchanged. Muscle aches: Improving leg pain. benign myopathic process seen by rheum.  Pt reassured regarding the benefits of medical therapy for his CAD. Doubt active angina.

## 2024-01-17 NOTE — ADDENDUM
[FreeTextEntry1] : I was contacted by the physicians assistant at [facility].  He is scheduled to undergo an endoscopic ultrasound of his common bile duct and wanted my opinion regarding his cardiovascular status. He is stable from a cardiovascular standpoint may proceed with the planned procedure.  He must continue aspirin therapy throughout the perioperative period.  Fax number: [phone]

## 2024-01-17 NOTE — HISTORY OF PRESENT ILLNESS
[FreeTextEntry1] : had chest pain and left arm pain causing him to go to the ER. Workup was unremarkable. His mother was actively dying at home. No new medications. Some rectal bleeding. Some bruising.  Prior: Taking praluent and zetia. Less careful with his diet of late. Walks for exercise a mile a day - anitra. "Feels different"  Prior: Significant pain in muscles persists. Off statin for 2 months. Got thru COVID with Paxlovid. No syncope or near syncope.  Prior: Left leg muscle tear.  Vaccinated 5 times.   Prior: Cramps in his forearms at night. Recent pulmonary eval and treatment for asthma. Concern about dyspnea too.  Prior: Has been on tapering dose of prednisone for his sarcoid lung issues. Left sided chest, neck, shoulder pain on and off for the past two weeks.Tender and positional. Significant fatigue "to the bone". Poor sleeping too. Planning chest CT for sarcoid, as cough is returning.  Prior: He has been undergoing treatment for active sarcoidosis that has affected his lungs.  His most prominent complaint is a dry nonproductive cough. His biggest concern for today's visit though is his severe coronary calcification reported on the CT scan of his chest.  He has no exertional chest pains or shortness of breath beyond the usual.   Prior: "Not totally inactive" Taking Praluent as directed. Sinus issues requiring surgery planned.   Feels good overal. Nothing on loop recorder. Walks a fair amount.(5k+ per day) and feels good, no chest pains,  but muscle aches persist.

## 2024-01-19 ENCOUNTER — APPOINTMENT (OUTPATIENT)
Dept: UROLOGY | Facility: CLINIC | Age: 69
End: 2024-01-19

## 2024-02-07 RX ORDER — ALIROCUMAB 75 MG/ML
75 INJECTION, SOLUTION SUBCUTANEOUS
Qty: 2 | Refills: 5 | Status: DISCONTINUED | COMMUNITY
Start: 2020-01-16 | End: 2024-02-07

## 2024-02-07 RX ORDER — EVOLOCUMAB 140 MG/ML
140 INJECTION, SOLUTION SUBCUTANEOUS
Qty: 1 | Refills: 5 | Status: ACTIVE | COMMUNITY
Start: 2024-02-07 | End: 1900-01-01

## 2024-02-27 ENCOUNTER — NON-APPOINTMENT (OUTPATIENT)
Age: 69
End: 2024-02-27

## 2024-02-28 ENCOUNTER — APPOINTMENT (OUTPATIENT)
Dept: PULMONOLOGY | Facility: CLINIC | Age: 69
End: 2024-02-28
Payer: MEDICARE

## 2024-02-28 VITALS
SYSTOLIC BLOOD PRESSURE: 128 MMHG | OXYGEN SATURATION: 96 % | HEIGHT: 65 IN | RESPIRATION RATE: 16 BRPM | DIASTOLIC BLOOD PRESSURE: 70 MMHG | WEIGHT: 164 LBS | BODY MASS INDEX: 27.32 KG/M2 | HEART RATE: 66 BPM | TEMPERATURE: 97.3 F

## 2024-02-28 DIAGNOSIS — R09.89 OTHER SPECIFIED SYMPTOMS AND SIGNS INVOLVING THE CIRCULATORY AND RESPIRATORY SYSTEMS: ICD-10-CM

## 2024-02-28 DIAGNOSIS — G47.30 SLEEP APNEA, UNSPECIFIED: ICD-10-CM

## 2024-02-28 DIAGNOSIS — R09.82 POSTNASAL DRIP: ICD-10-CM

## 2024-02-28 PROCEDURE — 99215 OFFICE O/P EST HI 40 MIN: CPT

## 2024-02-28 PROCEDURE — G2211 COMPLEX E/M VISIT ADD ON: CPT

## 2024-02-28 PROCEDURE — 71046 X-RAY EXAM CHEST 2 VIEWS: CPT | Mod: 26

## 2024-02-28 RX ORDER — INHALER, ASSIST DEVICES
SPACER (EA) MISCELLANEOUS
Qty: 1 | Refills: 0 | Status: ACTIVE | COMMUNITY
Start: 2020-07-09 | End: 1900-01-01

## 2024-02-28 RX ORDER — ALBUTEROL SULFATE 90 UG/1
108 (90 BASE) INHALANT RESPIRATORY (INHALATION)
Qty: 1 | Refills: 3 | Status: ACTIVE | COMMUNITY
Start: 2020-04-29 | End: 1900-01-01

## 2024-02-28 RX ORDER — BUDESONIDE AND FORMOTEROL FUMARATE DIHYDRATE 160; 4.5 UG/1; UG/1
160-4.5 AEROSOL RESPIRATORY (INHALATION) TWICE DAILY
Qty: 1 | Refills: 3 | Status: ACTIVE | COMMUNITY
Start: 2022-03-03 | End: 1900-01-01

## 2024-02-28 RX ORDER — IPRATROPIUM BROMIDE AND ALBUTEROL SULFATE 2.5; .5 MG/3ML; MG/3ML
0.5-2.5 (3) SOLUTION RESPIRATORY (INHALATION)
Qty: 360 | Refills: 2 | Status: ACTIVE | COMMUNITY
Start: 2024-02-28 | End: 1900-01-01

## 2024-02-28 NOTE — REVIEW OF SYSTEMS
[Sinus Problems] : sinus problems [Cough] : cough [SOB on Exertion] : sob on exertion [GERD] : gerd [Negative] : Endocrine

## 2024-02-28 NOTE — HISTORY OF PRESENT ILLNESS
[FreeTextEntry1] : Mr. CROSS is a 69 year old male with a history of cardiac disease, sarcoidosis, asthma, allergy, OSAS, s/p syncope (loop recorder) who presents today for sick visit. His chief complaint is   LAST VISIT 10/9/2023 - he notes feeling generally well aside from some fibromyalgia - he notes having some sternum pain which for him has been chronic - vision is stable - he notes not sleeping well - he notes his interstitial cystitis has been very active - he notes walking for exercise but he is limited to leg pain - he notes taking Gabapentin for his leg pain which is not working as much as he would like  -he denies any headaches, nausea, emesis, fever, chills, sweats, chest pain, chest pressure, coughing, wheezing, palpitations, constipation, diarrhea, vertigo, dysphagia, heartburn, reflux, itchy eyes, itchy ears, leg swelling, arthralgias, myalgias, or sour taste in the mouth.  TODAY'S VISIT 2/28/2024 -reports he had COVID and took Paxlovid 1/21//24 -reports he had endoscopic ultrasound of pancreas on 2/12 and 3 days later, he had dry, hacking cough, felt it in hest chest and throat, hoarseness -reports cough x 2 weeks -tried Tessalon and mucinex, both ineffective -was prescribed Prednisone 20 mg x 7 days, 10 mg x 7 days; currently on day 5 of 20 mg dose -reports prednisone causes him to have leg muscle discomfort -reports SOB with exertion -reports he does not use inhalers daily; only uses Albuterol inhaler as needed; not using Symbicort, Sprivia inhaler or Singulair -reports he has sinus issues- stated his sinuses become inflamed, dry and feels like there are "rocks". H/o surgeries for sinuses -reports does not use prescription nasal sprays and uses nasal saline as needed to help moisten the nares.   denies any headaches, nausea, vomiting, fever, chills, sweats, chest pain, chest pressure, palpitations, wheezing, constipation, dysphagia, dizziness, leg swelling, leg pain, itchy eyes, itchy ears, heartburn, reflux or sour taste in the mouth.

## 2024-02-28 NOTE — PHYSICAL EXAM
[No Acute Distress] : no acute distress [Well Nourished] : well nourished [No Deformities] : no deformities [Well Developed] : well developed [Normal Oropharynx] : normal oropharynx [Normal Appearance] : normal appearance [No Neck Mass] : no neck mass [Normal Rate/Rhythm] : normal rate/rhythm [Normal S1, S2] : normal s1, s2 [No Murmurs] : no murmurs [No Resp Distress] : no resp distress [Rales] : rales [Benign] : benign [No Abnormalities] : no abnormalities [Normal Gait] : normal gait [No Clubbing] : no clubbing [No Cyanosis] : no cyanosis [No Edema] : no edema [FROM] : FROM [Normal Color/ Pigmentation] : normal color/ pigmentation [No Focal Deficits] : no focal deficits [Oriented x3] : oriented x3 [Normal Affect] : normal affect [TextBox_68] : narcisa sawyer

## 2024-02-28 NOTE — ASSESSMENT
[FreeTextEntry1] : Mr. Feliz is a 69 year old male with a history of cardiac disease, sarcoidosis, asthma, allergy, OSAS, s/p syncope (loop recorder).   His shortness of breath is felt to be multifactorial due to:  -chest pain syndrome being costochondritis  -cardiac disease -poor breathing mechanics -out of shape -asthma -sarcoidosis  Problem 1: SOB  Differential diagnoses include: Asthma, Sarcoidosis, Pulm HTN, HF -Bibasilar crackes on exam -Continue Prednisone 20 mg x 7 days and 10 mg x 7 days regimen -Restart Symbicort 160 2 inhalations BID. rinse and gargle (NC) -Restart Spiriva 2 puffs QD (NC) -Continue Albuterol inhaler every 4-6 hours as needed for wheezing/sob  -Add duonebs 4 xday as needed for wheezing/sob -continue Singulair 10 mg before bed (not using-NC) -BNP ordered -Echo ordered- patient stated he will discuss further with cardio, Dr. Lisker- appt 3/11/24  -Asthma is believed to be caused by inherited (genetic) and environmental factor, but its exact cause is unknown. Asthma may be triggered by allergens, lung infections, or irritants in the air. Asthma triggers are different for each person -Inhaler technique reviewed as well as oral hygiene techniques reviewed with patient. Avoidance of cold air, extremes of temperature, rescue inhaler should be used before exercise. Order of medication reviewed with patient. Recommended use of a cool mist humidifier in the bedroom.  Problem 2: Cough r/t Asthma, PND, sarcoidosis -OTC Delsym  Problem 3: PND, allergic rhinitis (NC)  -On Dupixent 300 t1Ohbre - since 2020  -continue Saline nasal spray as needed -Offered Olopatadine nasal spray- declined -Offered Fluticasone nasal spray-declined -Followed by [facility] Environmental measures for allergies were encouraged including mattress and pillow cover, air purifier, and environmental controls.   problem 4: GERD -continue Pepcid 40 mg QHS -Rule of 2's- Avoid eating too much, too late, too poorly, too spicy, or two hours before bed  -For treatment of reflux, possible options discussed including diet control, H2 blockers, PPIs, as well as coating motility agents discussed as treatment options. Timing of meals and proximity of last meal to sleep were discussed. If symptoms persist, a formal gastrointestinal evaluation is needed.  problem 5: NIKKY -HST offered (no HST in chart)- patient declined HST or any interventions at this time -Discussed the risks/associations with coronary artery disease, atrial fibrillation, arrhythmia, memory loss, issues with concentration, stroke risk, hypertension, nocturia, chronic reflux/Hassan's esophagus some but not all inclusive. Treatment options discussed including CPAP/BiPAP machine, oral appliance, ProVent therapy, Oxy-Aid by [employer], new technologies, or positional sleep.   problem 6: sarcoidosis (?active) vs.?infection vs. BOOP (2nd opinion Dr. Christen Gallegos [facility]) -s/p CXR in office today- Prominent hilar bilaterally c/w sarcoidosis -Follow up with Rheum, Dr. Seaman -CT chest next 7/2024 -continue yearly ophthalmological evaluations/PFTs -Sarcoidosis is a medical mystery and can present with very serious illness or little consequence. The disease can affect any organ including skin, liver, lymph glands, spleen, eyes, nervous system, musculoskeletal system, heart, brain, kidneys, and including the lungs. Pulmonary sarcoidosis can cause loss of lung volume and abnormal lung stiffness. This disease is characterized by presence of granulomas, small areas of inflamed cells. Sarcoidosis patients should have regular cardiac and eye examinations as well as regular PFTs.  problem 7: cardiac disease -continue to follow up with the cardiologist Dr. Lisker  -s/p cardiac MRI 2019  F/U in Mar 25, 2024 with Dr. Slaughter with PFTs He is encouraged to call with any changes, concerns, or questions

## 2024-02-29 ENCOUNTER — APPOINTMENT (OUTPATIENT)
Dept: PULMONOLOGY | Facility: CLINIC | Age: 69
End: 2024-02-29

## 2024-02-29 LAB — NT-PROBNP SERPL-MCNC: 143 PG/ML

## 2024-03-01 ENCOUNTER — APPOINTMENT (OUTPATIENT)
Dept: CARDIOLOGY | Facility: CLINIC | Age: 69
End: 2024-03-01
Payer: MEDICARE

## 2024-03-01 PROCEDURE — 93306 TTE W/DOPPLER COMPLETE: CPT

## 2024-03-04 ENCOUNTER — TRANSCRIPTION ENCOUNTER (OUTPATIENT)
Age: 69
End: 2024-03-04

## 2024-03-04 RX ORDER — TIOTROPIUM BROMIDE 18 UG/1
18 CAPSULE ORAL; RESPIRATORY (INHALATION)
Qty: 1 | Refills: 3 | Status: ACTIVE | COMMUNITY
Start: 2018-05-22 | End: 1900-01-01

## 2024-03-07 ENCOUNTER — APPOINTMENT (OUTPATIENT)
Dept: CT IMAGING | Facility: CLINIC | Age: 69
End: 2024-03-07
Payer: MEDICARE

## 2024-03-07 ENCOUNTER — OUTPATIENT (OUTPATIENT)
Dept: OUTPATIENT SERVICES | Facility: HOSPITAL | Age: 69
LOS: 1 days | End: 2024-03-07
Payer: MEDICARE

## 2024-03-07 DIAGNOSIS — Z98.89 OTHER SPECIFIED POSTPROCEDURAL STATES: Chronic | ICD-10-CM

## 2024-03-07 DIAGNOSIS — R93.89 ABNORMAL FINDINGS ON DIAGNOSTIC IMAGING OF OTHER SPECIFIED BODY STRUCTURES: ICD-10-CM

## 2024-03-07 PROCEDURE — 71250 CT THORAX DX C-: CPT

## 2024-03-07 PROCEDURE — 71250 CT THORAX DX C-: CPT | Mod: 26,MH

## 2024-03-10 PROBLEM — N30.10 CHRONIC INTERSTITIAL CYSTITIS: Status: ACTIVE | Noted: 2023-12-04

## 2024-03-11 ENCOUNTER — APPOINTMENT (OUTPATIENT)
Dept: CARDIOLOGY | Facility: CLINIC | Age: 69
End: 2024-03-11
Payer: MEDICARE

## 2024-03-11 ENCOUNTER — NON-APPOINTMENT (OUTPATIENT)
Age: 69
End: 2024-03-11

## 2024-03-11 VITALS
WEIGHT: 165 LBS | BODY MASS INDEX: 27.49 KG/M2 | OXYGEN SATURATION: 98 % | SYSTOLIC BLOOD PRESSURE: 136 MMHG | HEIGHT: 65 IN | DIASTOLIC BLOOD PRESSURE: 72 MMHG | HEART RATE: 49 BPM

## 2024-03-11 PROCEDURE — G2211 COMPLEX E/M VISIT ADD ON: CPT

## 2024-03-11 PROCEDURE — 99214 OFFICE O/P EST MOD 30 MIN: CPT

## 2024-03-11 PROCEDURE — 93000 ELECTROCARDIOGRAM COMPLETE: CPT

## 2024-03-11 NOTE — PHYSICAL EXAM
[General Appearance - Well Developed] : well developed [General Appearance - Well Nourished] : well nourished [Normal Conjunctiva] : the conjunctiva exhibited no abnormalities [No Oral Pallor] : no oral pallor [Normal Jugular Venous V Waves Present] : normal jugular venous V waves present [Respiration, Rhythm And Depth] : normal respiratory rhythm and effort [Auscultation Breath Sounds / Voice Sounds] : lungs were clear to auscultation bilaterally [Murmurs] : no murmurs present [Heart Sounds] : normal S1 and S2 [Abdomen Soft] : soft [Abnormal Walk] : normal gait [Cyanosis, Localized] : no localized cyanosis [Skin Turgor] : normal skin turgor [Affect] : the affect was normal [Oriented To Time, Place, And Person] : oriented to person, place, and time

## 2024-03-11 NOTE — HISTORY OF PRESENT ILLNESS
[FreeTextEntry1] : Recent pulmonary issues. now on prednisone for acute bronchitis. Coughing and using inhaler. Dyspnea persists and improve with steroids. No orthopnea or PND.   Prior: had chest pain and left arm pain causing him to go to the ER. Workup was unremarkable. His mother was actively dying at home. No new medications. Some rectal bleeding. Some bruising.  Prior: Taking praluent and zetia. Less careful with his diet of late. Walks for exercise a mile a day - anitra. "Feels different"  Prior: Significant pain in muscles persists. Off statin for 2 months. Got thru COVID with Paxlovid. No syncope or near syncope.  Prior: Left leg muscle tear.  Vaccinated 5 times.   Prior: Cramps in his forearms at night. Recent pulmonary eval and treatment for asthma. Concern about dyspnea too.  Prior: Has been on tapering dose of prednisone for his sarcoid lung issues. Left sided chest, neck, shoulder pain on and off for the past two weeks.Tender and positional. Significant fatigue "to the bone". Poor sleeping too. Planning chest CT for sarcoid, as cough is returning.  Prior: He has been undergoing treatment for active sarcoidosis that has affected his lungs.  His most prominent complaint is a dry nonproductive cough. His biggest concern for today's visit though is his severe coronary calcification reported on the CT scan of his chest.  He has no exertional chest pains or shortness of breath beyond the usual.   Prior: "Not totally inactive" Taking Praluent as directed. Sinus issues requiring surgery planned.   Feels good overal. Nothing on loop recorder. Walks a fair amount.(5k+ per day) and feels good, no chest pains,  but muscle aches persist.

## 2024-03-11 NOTE — DISCUSSION/SUMMARY
[EKG obtained to assist in diagnosis and management of assessed problem(s)] : EKG obtained to assist in diagnosis and management of assessed problem(s) [FreeTextEntry1] : 69 year old with coronary disease and stable sarcoidosis. ECG SB, RBBB, Lateral Q's. Unchanged from prior. CAD: His coronary disease is stable. No angina. Continue aggressive medical therapy including aspirin, and Praluent and zetia if LDL < 70. LDL was at goal 47. Bradycardia on ECG is unchanged. Muscle aches: Improving leg pain. benign myopathic process seen by rheum.   Dyspnea: doubt severe CAD. Encouraged increasing exercise. will consider nuclear stress test if no improvement. See me 2 months.

## 2024-03-19 ENCOUNTER — APPOINTMENT (OUTPATIENT)
Dept: PULMONOLOGY | Facility: CLINIC | Age: 69
End: 2024-03-19
Payer: MEDICARE

## 2024-03-19 VITALS
DIASTOLIC BLOOD PRESSURE: 78 MMHG | HEIGHT: 65 IN | RESPIRATION RATE: 16 BRPM | HEART RATE: 68 BPM | OXYGEN SATURATION: 95 % | SYSTOLIC BLOOD PRESSURE: 136 MMHG | WEIGHT: 164 LBS | TEMPERATURE: 96.7 F | BODY MASS INDEX: 27.32 KG/M2

## 2024-03-19 DIAGNOSIS — R05.9 COUGH, UNSPECIFIED: ICD-10-CM

## 2024-03-19 PROCEDURE — 99214 OFFICE O/P EST MOD 30 MIN: CPT

## 2024-03-19 RX ORDER — INHALER, ASSIST DEVICES
SPACER (EA) MISCELLANEOUS
Qty: 1 | Refills: 0 | Status: ACTIVE | COMMUNITY
Start: 2024-03-19 | End: 1900-01-01

## 2024-03-19 NOTE — HISTORY OF PRESENT ILLNESS
[FreeTextEntry1] : VISIT 10/9/2023 Mr. CROSS is a 68 year old male with a history of cardiac disease, sarcoidosis, asthma, allergy, OSAS, s/p syncope (loop recorder) who presents today for a follow-up pulmonary evaluation. His chief complaint is - he notes feeling generally well aside from some fibromyalgia - he notes having some sternum pain which for him has been chronic - vision is stable - he notes not sleeping well - he notes his interstitial cystitis has been very active - he notes walking for exercise but he is limited to leg pain - he notes taking Gabapentin for his leg pain which is not working as much as he would like  -he denies any headaches, nausea, emesis, fever, chills, sweats, chest pain, chest pressure, coughing, wheezing, palpitations, constipation, diarrhea, vertigo, dysphagia, heartburn, reflux, itchy eyes, itchy ears, leg swelling, arthralgias, myalgias, or sour taste in the mouth.  LAST VISIT 2/28/2024 Mr. CROSS is a 69 year old male with a history of cardiac disease, sarcoidosis, asthma, allergy, OSAS, s/p syncope (loop recorder) who presents today for sick visit.  -reports he had COVID and took Paxlovid 1/21//24 -reports he had endoscopic ultrasound of pancreas on 2/12 and 3 days later, he had dry, hacking cough, felt it in hest chest and throat, hoarseness -reports cough x 2 weeks -tried Tessalon and mucinex, both ineffective -was prescribed Prednisone 20 mg x 7 days, 10 mg x 7 days; currently on day 5 of 20 mg dose -reports prednisone causes him to have leg muscle discomfort -reports SOB with exertion -reports he does not use inhalers daily; only uses Albuterol inhaler as needed; not using Symbicort, Sprivia inhaler or Singulair -reports he has sinus issues- stated his sinuses become inflamed, dry and feels like there are "rocks". H/o surgeries for sinuses -reports does not use prescription nasal sprays and uses nasal saline as needed to help moisten the nares.   denies any headaches, nausea, vomiting, fever, chills, sweats, chest pain, chest pressure, palpitations, wheezing, constipation, dysphagia, dizziness, leg swelling, leg pain, itchy eyes, itchy ears, heartburn, reflux or sour taste in the mouth.  TODAY'S VISIT 3/19/24 Mr. CROSS is a 68 year old male with a history of cardiac disease, sarcoidosis, asthma, allergy, OSAS, s/p syncope (loop recorder) who presents today for a follow-up sick visit. His chief complaint is  -reports he is on Prednisone 10 mg for additional 4 days (was prescribed Prednisone 20 mg x 7 days, 10 mg x 7 days -reports he feels better when he is on 20 mg of Prednisone -reports he has been using his inhalers and nebulizers -reports he is afraid of getting an infection from using nebulizers despite cleaning the device -reports he gets hoarse voice from use of inhalers and nebulizers and is requesting spacer -reports sob has been inconsistent, primary issue is cough for over month -reports nonproductive cough is persistent; Tessalon, Delsym, Mucinex ineffective -reports he has Guinea pigs and states he is allergic to the hay, but states caring for them is worth potential symptoms that arise -reports he is only using Saline nasal spray- followed by ENT   denies any headaches, nausea, vomiting, fever, chills, sweats, chest pain, chest pressure, palpitations, wheezing, fatigue, constipation, dysphagia, dizziness, leg swelling, leg pain, itchy eyes, itchy ears, heartburn, reflux or sour taste in the mouth.

## 2024-03-19 NOTE — PHYSICAL EXAM
[No Acute Distress] : no acute distress [Well Nourished] : well nourished [No Deformities] : no deformities [Well Developed] : well developed [Normal Oropharynx] : normal oropharynx [Normal Appearance] : normal appearance [No Neck Mass] : no neck mass [Normal Rate/Rhythm] : normal rate/rhythm [Normal S1, S2] : normal s1, s2 [No Murmurs] : no murmurs [No Resp Distress] : no resp distress [Rales] : rales [No Abnormalities] : no abnormalities [Benign] : benign [Normal Gait] : normal gait [No Clubbing] : no clubbing [No Cyanosis] : no cyanosis [No Edema] : no edema [FROM] : FROM [Normal Color/ Pigmentation] : normal color/ pigmentation [No Focal Deficits] : no focal deficits [Oriented x3] : oriented x3 [Normal Affect] : normal affect [TextBox_68] : mild bibasilar rales

## 2024-03-19 NOTE — ASSESSMENT
[FreeTextEntry1] : Mr. Feliz is a 69 year old male with a history of cardiac disease, sarcoidosis, asthma, allergy, OSAS, s/p syncope (loop recorder).   His shortness of breath is felt to be multifactorial due to:  -chest pain syndrome being costochondritis  -cardiac disease -poor breathing mechanics -out of shape -asthma -sarcoidosis  Problem 1: Asthma  -mild bibasilar crackles-improved from previous exam -Continue Prednisone 20 mg x 7 days and 10 mg x 7 days regimen (4 days remaining of 10 mg) -Continue Symbicort 160 2 inhalations BID. rinse and gargle  -Continue Spiriva 2 puffs QD  -Continue Ipratropium-albuterol neb 4 xday as needed for wheezing/sob -Continue Albuterol inhaler every 4-6 hours as needed for wheezing/sob  -continue Singulair 10 mg before bed (not using-NC)  -Asthma is believed to be caused by inherited (genetic) and environmental factor, but its exact cause is unknown. Asthma may be triggered by allergens, lung infections, or irritants in the air. Asthma triggers are different for each person -Inhaler technique reviewed as well as oral hygiene techniques reviewed with patient. Avoidance of cold air, extremes of temperature, rescue inhaler should be used before exercise. Order of medication reviewed with patient. Recommended use of a cool mist humidifier in the bedroom.  Problem 2: Cough r/t Asthma, PND, sarcoidosis -Ineffective: Tessalon, OTC Delsym, OTC Mucinex -Add gabapentin 100 mg 3xday as needed (patient stated he has at home)  Problem 3: PND, allergic rhinitis  -On Dupixent 300 g1Nmfam - since 2020  -continue Saline nasal spray as needed -Offered Olopatadine nasal spray- declined -Offered Fluticasone nasal spray-declined -Followed by [facility] Environmental measures for allergies were encouraged including mattress and pillow cover, air purifier, and environmental controls.   problem 4: GERD -continue Pepcid 40 mg QHS -Rule of 2's- Avoid eating too much, too late, too poorly, too spicy, or two hours before bed  -For treatment of reflux, possible options discussed including diet control, H2 blockers, PPIs, as well as coating motility agents discussed as treatment options. Timing of meals and proximity of last meal to sleep were discussed. If symptoms persist, a formal gastrointestinal evaluation is needed.  problem 5: NIKKY -HST offered (no HST in chart)- patient declined HST or any interventions at this time -Discussed the risks/associations with coronary artery disease, atrial fibrillation, arrhythmia, memory loss, issues with concentration, stroke risk, hypertension, nocturia, chronic reflux/Hassan's esophagus some but not all inclusive. Treatment options discussed including CPAP/BiPAP machine, oral appliance, ProVent therapy, Oxy-Aid by [employer], new technologies, or positional sleep.   problem 6: sarcoidosis (?active) vs.?infection vs. BOOP (2nd opinion Dr. Christen Gallegos [facility]) -s/p 2/28 CXR in office - Prominent hilar bilaterally c/w sarcoidosis -Advised to follow up with Rheum, Dr. Seaman -CT chest next 7/2024 -continue yearly ophthalmological evaluations/PFTs -Sarcoidosis is a medical mystery and can present with very serious illness or little consequence. The disease can affect any organ including skin, liver, lymph glands, spleen, eyes, nervous system, musculoskeletal system, heart, brain, kidneys, and including the lungs. Pulmonary sarcoidosis can cause loss of lung volume and abnormal lung stiffness. This disease is characterized by presence of granulomas, small areas of inflamed cells. Sarcoidosis patients should have regular cardiac and eye examinations as well as regular PFTs.  problem 7: cardiac disease -continue to follow up with the cardiologist Dr. Lisker  -BNP ordered ([facility]) -s/p Echo 3/1  -s/p cardiac MRI 2019  F/U in Mar 25, 2024 with Dr. Slaughter with PFTs He is encouraged to call with any changes, concerns, or questions 
normal...

## 2024-03-25 ENCOUNTER — APPOINTMENT (OUTPATIENT)
Dept: PULMONOLOGY | Facility: CLINIC | Age: 69
End: 2024-03-25
Payer: MEDICARE

## 2024-03-25 VITALS
HEART RATE: 78 BPM | BODY MASS INDEX: 27.32 KG/M2 | SYSTOLIC BLOOD PRESSURE: 120 MMHG | DIASTOLIC BLOOD PRESSURE: 68 MMHG | RESPIRATION RATE: 16 BRPM | OXYGEN SATURATION: 95 % | TEMPERATURE: 97.4 F | WEIGHT: 164 LBS | HEIGHT: 65 IN

## 2024-03-25 DIAGNOSIS — J45.909 UNSPECIFIED ASTHMA, UNCOMPLICATED: ICD-10-CM

## 2024-03-25 DIAGNOSIS — R06.02 SHORTNESS OF BREATH: ICD-10-CM

## 2024-03-25 DIAGNOSIS — J30.9 ALLERGIC RHINITIS, UNSPECIFIED: ICD-10-CM

## 2024-03-25 DIAGNOSIS — K21.9 GASTRO-ESOPHAGEAL REFLUX DISEASE W/OUT ESOPHAGITIS: ICD-10-CM

## 2024-03-25 DIAGNOSIS — R93.89 ABNORMAL FINDINGS ON DIAGNOSTIC IMAGING OF OTHER SPECIFIED BODY STRUCTURES: ICD-10-CM

## 2024-03-25 PROCEDURE — 94729 DIFFUSING CAPACITY: CPT

## 2024-03-25 PROCEDURE — 95012 NITRIC OXIDE EXP GAS DETER: CPT

## 2024-03-25 PROCEDURE — ZZZZZ: CPT

## 2024-03-25 PROCEDURE — 94727 GAS DIL/WSHOT DETER LNG VOL: CPT

## 2024-03-25 PROCEDURE — 99214 OFFICE O/P EST MOD 30 MIN: CPT | Mod: 25

## 2024-03-25 PROCEDURE — 94010 BREATHING CAPACITY TEST: CPT

## 2024-03-25 RX ORDER — PANTOPRAZOLE 40 MG/1
40 TABLET, DELAYED RELEASE ORAL
Qty: 90 | Refills: 1 | Status: ACTIVE | COMMUNITY
Start: 2024-03-25 | End: 1900-01-01

## 2024-03-25 NOTE — ADDENDUM
[FreeTextEntry1] :  Documented by Lele Chandler acting as a scribe for Dr. Nazario Slaughter on 03/25/2024 .   All medical record entries made by the Scribe were at my, Dr. Nazario Slaughter's direction and personally dictated by me on 03/25/2024 . I have reviewed the chart and agree that the record accurately reflects my personal performance of the history, Physical exam, assessment, and plan. I have also personally directed, reviewed, and agree with the discharge instructions.

## 2024-03-25 NOTE — HISTORY OF PRESENT ILLNESS
[FreeTextEntry1] : Mr. CROSS is a 69 year old male with a history of cardiac disease, sarcoidosis, asthma, allergy, OSAS, s/p syncope (loop recorder) who presents today  for a follow-up pulmonary evaluation. His chief complaint is   -he notes a dry crummy cough with some production -he notes cold air bring around his cough -he notes difficulty swallowing - he notes a lot of back pain -he notes some dry mucus and nasal issues -he notes sleeping in short cycles -he notes he does tolerate the nebulizer is it gets in his throat after usage -he notes he has GERD   -Patient denies any headaches, nausea, vomiting, fever, chills, sweats, chest pain, chest pressure, palpitations, wheezing, fatigue, diarrhea, constipation dysphagia, arthralgias, myalgias, dizziness, leg swelling, leg pain, itchy eyes, itchy ears, heartburn or sour taste in mouth.

## 2024-03-25 NOTE — ASSESSMENT
[FreeTextEntry1] : Mr. Feliz is a 69 year old male with a history of cardiac disease, sarcoidosis, asthma, allergy, OSAS, s/p syncope (loop recorder). c/w inactive sarcoidosis improved - with steroids; #1 MS pain #2 issue post COVID 19- resolving  His shortness of breath is felt to be multifactorial due to:  -chest pain syndrome being costochondritis  -cardiac disease -poor breathing mechanics -out of shape -asthma (stable) -sarcoidosis  problem 1: asthma- (active s/p COVID 19 1/2024) -Continue Symbicort 160 2 inhalations BID  -continue Singulair 10 mg before bed -continue Spiriva 2 puffs QD  -Asthma is  believed to be caused by inherited (genetic) and environmental factor, but its exact cause is unknown. Asthma may be triggered by allergens, lung infections, or irritants in the air. Asthma triggers are different for each person -Inhaler technique reviewed as well as oral hygiene techniques reviewed with patient. Avoidance of cold air, extremes of temperature, rescue inhaler should be used before exercise. Order of medication reviewed with patient. Recommended use of a cool mist humidifier in the bedroom.  problem 2: allergic rhinitis/sinusitis (NONCOMPLIANT) - quiet  -On Dupixent 300 [id number] - since 2020  -Continue Clarinex 5mg QHS  -he is being recommended to use nasal saline - Navage "Ponaris" - Qnasl 1 sniff BID or Xhance 1 sniff BID (Budesonide .5 BID) -continue Olopatadine .6 sniff each nostril BID  Environmental measures for allergies were encouraged including mattress and pillow cover, air purifier, and environmental controls.   problem 3: GERD (active)  -add Protonix 40 mg QAM, pre-breakfast -continue Pepcid 40 mg QHS -Rule of 2's- Avoid eating too much, too late, too poorly, too spicy, or two hours before bed  -Things to avoid including overeating, spicy foods, tight clothing, eating within three hours of bed, this list is not all inclusive.  -For treatment of reflux, possible options discussed including diet control, H2 blockers, PPIs, as well as coating motility agents discussed as treatment options. Timing of meals and proximity of last meal to sleep were discussed. If symptoms persist, a formal gastrointestinal evaluation is needed.  problem 4: NIKKY -Epsom salt bath before bed -add MyoCalm PM -he is being recommended an oral appliance and highly encourage to follow up with Dr. Nawaf Hoang; Bryn -Discussed the risks/associations with coronary artery disease, atrial fibrillation, arrhythmia, memory loss, issues with concentration, stroke risk, hypertension, nocturia, chronic reflux/Hassan's esophagus some but not all inclusive. Treatment options discussed including CPAP/BiPAP machine, oral appliance, ProVent therapy, Oxy-Aid by [employer]pite[employer], new technologies, or positional sleep.   problem 5: sarcoidosis (?active) vs. ?infection vs. BOOP - improved (2nd opinion Dr. Christen Gallegos [facility])- (quiet) -s/p Prednisone 20mg QDay x 1 month then reevaluate - alternate 20 mg even/15 mg odd - move to 5mg qd x2weeks then reduce by 1mg every 2weeks to off  -Information sheet given to the patient to be reviewed, this medication is never to be used without consulting the prescribing physician. Proper dietary restraint is necessary specifically salt containing foods, if any reaction may occur should be reported.   -s/p Biaxin 500 mg BID x 2 weeks - s/p CXR in 3/2022 - [facility]  -s/p CT in 2 months - 11/15/2021- returned to normal , next 2025 -continue yearly ophthalmological evaluations/PFTs -Sarcoidosis is a medical mystery and can present with very serious illness or little consequence. The disease can affect any organ including skin, liver, lymph glands, spleen, eyes, nervous system, musculoskeletal system, heart, brain, kidneys, and including the lungs. Pulmonary sarcoidosis can cause loss of lung volume and abnormal lung stiffness. This disease is characterized by presence of granulomas, small areas of inflamed cells. Sarcoidosis patients should have regular cardiac and eye examinations as well as regular PFTs.  Problem 5A: Pain -continue Neurontin 100 qHS (no interest)  problem 6: cardiac disease -continue to follow up with the cardiologist Dr. Lisker  -s/p cardiac MRI 2019  problem 7: overweight -Recommended Ander Duarte's 10-day detox diet and book.  -Weight loss, exercise, and diet control were discussed and are highly encouraged. Treatment options were given such as, aqua therapy, and contacting a nutritionist. Recommended to use the elliptical, stationary bike, less use of treadmill.  Obesity is associated with worsening asthma, shortness of breath, and potential for cardiac disease, diabetes, and other underlying medical conditions.   Problem 8: Costochondritis (Quiet) -add SPM/quercitin -recommended Topricin cream -recommend P6 cream  Problem 9: Health Maintenance/COVID19 Precautions: -Recommend PEMPF mat -s/p COVID-19 infection 12/2022, 1/2025 -recommended Sanotize anti viral nasal spray in case of viral infection  - S/p Covid 19 vaccine (Moderna) x5 Immune Support Recommendations:  -OTC Vitamin C 500mg BID  -OTC Quercetin 250-500mg BID  -OTC Zinc 75-100mg per day  -OTC Melatonin 1or 2mg a night  -OTC Vitamin D 1-4000mg per day   Problem 10: health maintenance -continue Urology f/u -recommended a yearly flu shot after October 15 (2022) -recommended strep pneumonia vaccines: Prevnar-13 vaccine, followed by Pneumo vaccine 23 on year following (completed) -recommended early intervention for URIs -recommended osteoporosis evaluations -recommended early dermatological evaluations -recommended after the age of 50 to the age of 70, colonoscopy every 5 years -encouraged early intervention  F/U in 4 months with spi He is encouraged to call with any changes, concerns, or questions

## 2024-03-25 NOTE — PROCEDURE
[FreeTextEntry1] :  Full PFT reveals normal flows; FEV1 was 3.30 L which is 126 % of predicted, normal lung volumes, normal diffusions, at 23.3 L which is 125% predicted, normal flow volume loop. PFT's for performed to evaluate for SOB.   FENO was 24; a normal value being less than 25Fractional exhaled nitric oxide (FENO) is regarded as a simple, noninvasive method for assessing eosinophilic airway inflammation. Produced by a variety of cells within the lung, nitric oxide (NO) concentrations are generally low in healthy individuals. However, high concentrations of NO appear to be involved in nonspecific host defense mechanisms and chronic inflammatory diseases such as asthma. The American Thoracic Society (ATS) therefore has strongly recommended using FENO to aid in the assessment, management, and long-term monitoring of eosinophilic airway inflammation and asthma, and for identifying steroid responsive individuals whose chronic respiratory symptoms may be caused by airway inflammation. In their 2011 clinical practice guideline, the ATS emphasizes the importance of using FENO.

## 2024-03-25 NOTE — PHYSICAL EXAM
[No Acute Distress] : no acute distress [Well Nourished] : well nourished [No Deformities] : no deformities [Well Developed] : well developed [Normal Oropharynx] : normal oropharynx [III] : Mallampati Class: III [Normal Appearance] : normal appearance [No Neck Mass] : no neck mass [Normal Rate/Rhythm] : normal rate/rhythm [Normal S1, S2] : normal s1, s2 [No Resp Distress] : no resp distress [Clear to Auscultation Bilaterally] : clear to auscultation bilaterally [No Abnormalities] : no abnormalities [Benign] : benign [Normal Gait] : normal gait [No Clubbing] : no clubbing [No Cyanosis] : no cyanosis [No Edema] : no edema [FROM] : FROM [Normal Color/ Pigmentation] : normal color/ pigmentation [No Focal Deficits] : no focal deficits [Oriented x3] : oriented x3 [Normal Affect] : normal affect [Murmur ___ / 6] : murmur [unfilled] / 6 [TextBox_2] : OW [TextBox_68] : I:E 1:3, clear  [TextBox_44] : 2/6 systolic murmur

## 2024-04-23 ENCOUNTER — RX RENEWAL (OUTPATIENT)
Age: 69
End: 2024-04-23

## 2024-04-23 RX ORDER — OLOPATADINE HYDROCHLORIDE 665 UG/1
0.6 SPRAY, METERED NASAL
Qty: 30.5 | Refills: 5 | Status: ACTIVE | COMMUNITY
Start: 2017-05-19 | End: 1900-01-01

## 2024-05-07 ENCOUNTER — RX RENEWAL (OUTPATIENT)
Age: 69
End: 2024-05-07

## 2024-05-07 ENCOUNTER — APPOINTMENT (OUTPATIENT)
Dept: CARDIOLOGY | Facility: CLINIC | Age: 69
End: 2024-05-07
Payer: MEDICARE

## 2024-05-07 ENCOUNTER — NON-APPOINTMENT (OUTPATIENT)
Age: 69
End: 2024-05-07

## 2024-05-07 VITALS
HEIGHT: 65 IN | RESPIRATION RATE: 17 BRPM | HEART RATE: 50 BPM | OXYGEN SATURATION: 96 % | SYSTOLIC BLOOD PRESSURE: 122 MMHG | BODY MASS INDEX: 27.16 KG/M2 | DIASTOLIC BLOOD PRESSURE: 72 MMHG | WEIGHT: 163 LBS

## 2024-05-07 DIAGNOSIS — I45.10 UNSPECIFIED RIGHT BUNDLE-BRANCH BLOCK: ICD-10-CM

## 2024-05-07 DIAGNOSIS — I25.10 ATHEROSCLEROTIC HEART DISEASE OF NATIVE CORONARY ARTERY W/OUT ANGINA PECTORIS: ICD-10-CM

## 2024-05-07 PROCEDURE — G2211 COMPLEX E/M VISIT ADD ON: CPT

## 2024-05-07 PROCEDURE — 99214 OFFICE O/P EST MOD 30 MIN: CPT

## 2024-05-07 PROCEDURE — 93000 ELECTROCARDIOGRAM COMPLETE: CPT

## 2024-05-07 RX ORDER — ALFUZOSIN HYDROCHLORIDE 10 MG/1
10 TABLET, EXTENDED RELEASE ORAL
Qty: 30 | Refills: 2 | Status: ACTIVE | COMMUNITY
Start: 2023-08-04 | End: 1900-01-01

## 2024-05-07 NOTE — PHYSICAL EXAM
[General Appearance - Well Developed] : well developed [General Appearance - Well Nourished] : well nourished [Normal Conjunctiva] : the conjunctiva exhibited no abnormalities [No Oral Pallor] : no oral pallor [Normal Jugular Venous V Waves Present] : normal jugular venous V waves present [Respiration, Rhythm And Depth] : normal respiratory rhythm and effort [Auscultation Breath Sounds / Voice Sounds] : lungs were clear to auscultation bilaterally [Heart Sounds] : normal S1 and S2 [Murmurs] : no murmurs present [Abdomen Soft] : soft [Abnormal Walk] : normal gait [Cyanosis, Localized] : no localized cyanosis [Skin Turgor] : normal skin turgor [Oriented To Time, Place, And Person] : oriented to person, place, and time [Affect] : the affect was normal Chonodrocutaneous Helical Advancement Flap Text: The defect edges were debeveled with a #15 scalpel blade.  Given the location of the defect and the proximity to free margins a chondrocutaneous helical advancement flap was deemed most appropriate.  Using a sterile surgical marker, the appropriate advancement flap was drawn incorporating the defect and placing the expected incisions within the relaxed skin tension lines where possible.    The area thus outlined was incised deep to adipose tissue with a #15 scalpel blade.  The skin margins were undermined to an appropriate distance in all directions utilizing iris scissors.

## 2024-05-07 NOTE — DISCUSSION/SUMMARY
[FreeTextEntry1] : 69 year old with coronary disease and stable sarcoidosis. ECG SB, RBBB, Lateral Q's. Unchanged from prior. CAD: No definite angina. Limited by muscle aches. His coronary disease is likely stable.  Continue aggressive medical therapy including aspirin, and Praluent and zetia if LDL < 70. LDL was at goal 47. Will obtain a repeat nuclear stress test to r/o worsening CAD. Bradycardia on ECG is unchanged. Muscle aches: Improving leg pain. benign myopathic process seen by rheum.  labs today. Dyspnea: doubt severe CAD. Encouraged increasing exercise. will consider nuclear stress test if no improvement. See me 2 months. [EKG obtained to assist in diagnosis and management of assessed problem(s)] : EKG obtained to assist in diagnosis and management of assessed problem(s)

## 2024-05-07 NOTE — HISTORY OF PRESENT ILLNESS
[FreeTextEntry1] : He had an EMG that was unchanged. Recent colonoscopy was noted for divertiulosis, limited by leg muscle aches. No chest pain.   Prior: Recent pulmonary issues. now on prednisone for acute bronchitis. Coughing and using inhaler. Dyspnea persists and improve with steroids. No orthopnea or PND.  Prior: had chest pain and left arm pain causing him to go to the ER. Workup was unremarkable. His mother was actively dying at home. No new medications. Some rectal bleeding. Some bruising.  Prior: Taking praluent and zetia. Less careful with his diet of late. Walks for exercise a mile a day - anitra. "Feels different"  Prior: Significant pain in muscles persists. Off statin for 2 months. Got thru COVID with Paxlovid. No syncope or near syncope.  Prior: Left leg muscle tear.  Vaccinated 5 times.   Prior: Cramps in his forearms at night. Recent pulmonary eval and treatment for asthma. Concern about dyspnea too.  Prior: Has been on tapering dose of prednisone for his sarcoid lung issues. Left sided chest, neck, shoulder pain on and off for the past two weeks.Tender and positional. Significant fatigue "to the bone". Poor sleeping too. Planning chest CT for sarcoid, as cough is returning.  Prior: He has been undergoing treatment for active sarcoidosis that has affected his lungs.  His most prominent complaint is a dry nonproductive cough. His biggest concern for today's visit though is his severe coronary calcification reported on the CT scan of his chest.  He has no exertional chest pains or shortness of breath beyond the usual.   Prior: "Not totally inactive" Taking Praluent as directed. Sinus issues requiring surgery planned.   Feels good overal. Nothing on loop recorder. Walks a fair amount.(5k+ per day) and feels good, no chest pains,  but muscle aches persist.

## 2024-05-08 LAB
ALBUMIN SERPL ELPH-MCNC: 4.4 G/DL
ALP BLD-CCNC: 55 U/L
ALT SERPL-CCNC: 19 U/L
ANION GAP SERPL CALC-SCNC: 10 MMOL/L
AST SERPL-CCNC: 21 U/L
BILIRUB SERPL-MCNC: 0.6 MG/DL
BUN SERPL-MCNC: 20 MG/DL
CALCIUM SERPL-MCNC: 9.5 MG/DL
CHLORIDE SERPL-SCNC: 104 MMOL/L
CHOLEST SERPL-MCNC: 123 MG/DL
CK SERPL-CCNC: 305 U/L
CO2 SERPL-SCNC: 27 MMOL/L
CREAT SERPL-MCNC: 0.97 MG/DL
EGFR: 84 ML/MIN/1.73M2
GLUCOSE SERPL-MCNC: 109 MG/DL
HCT VFR BLD CALC: 48.7 %
HDLC SERPL-MCNC: 69 MG/DL
HGB BLD-MCNC: 16.5 G/DL
LDLC SERPL CALC-MCNC: 39 MG/DL
MCHC RBC-ENTMCNC: 31.1 PG
MCHC RBC-ENTMCNC: 33.9 GM/DL
MCV RBC AUTO: 91.7 FL
NONHDLC SERPL-MCNC: 54 MG/DL
PLATELET # BLD AUTO: 170 K/UL
POTASSIUM SERPL-SCNC: 5.2 MMOL/L
PROT SERPL-MCNC: 6.1 G/DL
RBC # BLD: 5.31 M/UL
RBC # FLD: 13.5 %
SODIUM SERPL-SCNC: 141 MMOL/L
TRIGL SERPL-MCNC: 79 MG/DL
WBC # FLD AUTO: 6.32 K/UL

## 2024-05-16 ENCOUNTER — APPOINTMENT (OUTPATIENT)
Dept: CARDIOLOGY | Facility: CLINIC | Age: 69
End: 2024-05-16

## 2024-05-29 ENCOUNTER — RX RENEWAL (OUTPATIENT)
Age: 69
End: 2024-05-29

## 2024-05-29 RX ORDER — EZETIMIBE 10 MG/1
10 TABLET ORAL DAILY
Qty: 90 | Refills: 3 | Status: ACTIVE | COMMUNITY
Start: 2023-05-21 | End: 1900-01-01

## 2024-06-12 ENCOUNTER — APPOINTMENT (OUTPATIENT)
Dept: RHEUMATOLOGY | Facility: CLINIC | Age: 69
End: 2024-06-12
Payer: MEDICARE

## 2024-06-12 ENCOUNTER — LABORATORY RESULT (OUTPATIENT)
Age: 69
End: 2024-06-12

## 2024-06-12 VITALS
HEART RATE: 58 BPM | SYSTOLIC BLOOD PRESSURE: 126 MMHG | BODY MASS INDEX: 27.16 KG/M2 | DIASTOLIC BLOOD PRESSURE: 75 MMHG | WEIGHT: 163 LBS | HEIGHT: 65 IN | OXYGEN SATURATION: 95 % | RESPIRATION RATE: 16 BRPM

## 2024-06-12 DIAGNOSIS — M79.10 MYALGIA, UNSPECIFIED SITE: ICD-10-CM

## 2024-06-12 DIAGNOSIS — D86.9 SARCOIDOSIS, UNSPECIFIED: ICD-10-CM

## 2024-06-12 PROCEDURE — G2211 COMPLEX E/M VISIT ADD ON: CPT | Mod: NC

## 2024-06-12 PROCEDURE — 99214 OFFICE O/P EST MOD 30 MIN: CPT

## 2024-06-12 RX ORDER — GABAPENTIN 100 MG/1
100 CAPSULE ORAL
Qty: 150 | Refills: 3 | Status: ACTIVE | COMMUNITY
Start: 2023-05-16 | End: 1900-01-01

## 2024-06-12 RX ORDER — PREDNISONE 10 MG/1
10 TABLET ORAL
Qty: 1 | Refills: 0 | Status: DISCONTINUED | COMMUNITY
Start: 2024-03-05 | End: 2024-06-12

## 2024-06-13 LAB
24R-OH-CALCIDIOL SERPL-MCNC: 61.1 PG/ML
ALBUMIN SERPL ELPH-MCNC: 4.6 G/DL
ALP BLD-CCNC: 61 U/L
ALT SERPL-CCNC: 20 U/L
ANION GAP SERPL CALC-SCNC: 13 MMOL/L
AST SERPL-CCNC: 24 U/L
BASOPHILS # BLD AUTO: 0.04 K/UL
BASOPHILS NFR BLD AUTO: 0.7 %
BILIRUB SERPL-MCNC: 0.4 MG/DL
BUN SERPL-MCNC: 26 MG/DL
CALCIUM SERPL-MCNC: 9.5 MG/DL
CHLORIDE SERPL-SCNC: 101 MMOL/L
CO2 SERPL-SCNC: 25 MMOL/L
CREAT SERPL-MCNC: 1 MG/DL
CRP SERPL-MCNC: 3 MG/L
EGFR: 81 ML/MIN/1.73M2
EOSINOPHIL # BLD AUTO: 0.24 K/UL
EOSINOPHIL NFR BLD AUTO: 4.1 %
ERYTHROCYTE [SEDIMENTATION RATE] IN BLOOD BY WESTERGREN METHOD: 6 MM/HR
GLUCOSE SERPL-MCNC: 88 MG/DL
HCT VFR BLD CALC: 51 %
HGB BLD-MCNC: 16.5 G/DL
IMM GRANULOCYTES NFR BLD AUTO: 0.2 %
LYMPHOCYTES # BLD AUTO: 1.08 K/UL
LYMPHOCYTES NFR BLD AUTO: 18.3 %
MAN DIFF?: NORMAL
MCHC RBC-ENTMCNC: 31.1 PG
MCHC RBC-ENTMCNC: 32.4 GM/DL
MCV RBC AUTO: 96.2 FL
MONOCYTES # BLD AUTO: 0.48 K/UL
MONOCYTES NFR BLD AUTO: 8.1 %
NEUTROPHILS # BLD AUTO: 4.04 K/UL
NEUTROPHILS NFR BLD AUTO: 68.6 %
PLATELET # BLD AUTO: 198 K/UL
POTASSIUM SERPL-SCNC: 5.4 MMOL/L
PROT SERPL-MCNC: 6.7 G/DL
RBC # BLD: 5.3 M/UL
RBC # FLD: 13.2 %
SODIUM SERPL-SCNC: 140 MMOL/L
WBC # FLD AUTO: 5.89 K/UL

## 2024-06-13 RX ORDER — PREDNISONE 10 MG/1
10 TABLET ORAL
Qty: 21 | Refills: 0 | Status: DISCONTINUED | COMMUNITY
Start: 2024-02-16 | End: 2024-06-13

## 2024-06-13 NOTE — HISTORY OF PRESENT ILLNESS
[de-identified] : Last seen in May, 2023 [FreeTextEntry1] : Interval History : ---------------------- = has LE pain and cramps, right ankle pain, persistent pain in both shoulders,  = no worsening of muscle weakness = burning pain in thighs and calves, when walks feel like walking in on a sand = on Gabapentin - no improvement yet

## 2024-06-13 NOTE — ASSESSMENT
[FreeTextEntry1] :  ## Myalgia and trivial mildly increased CPK but normal muscle strength = off statin/ on Repatha the etiology is not clear - ? benign / ? if related sarcoidosis ## Chronic pain syndrome / LE neuropathic pain  ## Severe DDD - under care of pain management- tx with Percocet - on gabapentin 100 mg 3-4 a day - helps . H/o sarcoidosis, dx on lung bx   = labs = sms likely are multifactorial and DDD is likely to play a significant role -  spine center/ pain management = will get muscle MRI to evaluate for presence of inflammation to evaluate for the site for muscle bx = continue Gabapentin dose for pain control - 100-100- 300 mg    FU as needed

## 2024-06-13 NOTE — CONSULT LETTER
[Dear  ___] : Dear  [unfilled], [Consult Closing:] : Thank you very much for allowing me to participate in the care of this patient.  If you have any questions, please do not hesitate to contact me. [FreeTextEntry1] : \par

## 2024-06-13 NOTE — PHYSICAL EXAM
[General Appearance - Alert] : alert [General Appearance - In No Acute Distress] : in no acute distress [General Appearance - Well Nourished] : well nourished [Sclera] : the sclera and conjunctiva were normal [Nasal Cavity] : the nasal mucosa and septum were normal [Oropharynx] : the oropharynx was normal [Auscultation Breath Sounds / Voice Sounds] : lungs were clear to auscultation bilaterally [Heart Rate And Rhythm] : heart rate was normal and rhythm regular [Heart Sounds] : normal S1 and S2 [Edema] : there was no peripheral edema [Bowel Sounds] : normal bowel sounds [Abdomen Soft] : soft [Abdomen Tenderness] : non-tender [] : no hepato-splenomegaly [Cervical Lymph Nodes Enlarged Posterior Bilaterally] : posterior cervical [Cervical Lymph Nodes Enlarged Anterior Bilaterally] : anterior cervical [Axillary Lymph Nodes Enlarged Bilaterally] : axillary [Motor Exam] : the motor exam was normal [No Focal Deficits] : no focal deficits [Musculoskeletal - Swelling] : no joint swelling seen [FreeTextEntry1] : multiple tender points,  normal muscle strength

## 2024-06-18 LAB
ALBUMIN MFR SERPL ELPH: 64.8 %
ALBUMIN SERPL-MCNC: 4.3 G/DL
ALBUMIN/GLOB SERPL: 1.8 RATIO
ALPHA1 GLOB MFR SERPL ELPH: 4.9 %
ALPHA1 GLOB SERPL ELPH-MCNC: 0.3 G/DL
ALPHA2 GLOB MFR SERPL ELPH: 10.6 %
ALPHA2 GLOB SERPL ELPH-MCNC: 0.7 G/DL
B-GLOBULIN MFR SERPL ELPH: 11.8 %
B-GLOBULIN SERPL ELPH-MCNC: 0.8 G/DL
DEPRECATED KAPPA LC FREE/LAMBDA SER: 1.38 RATIO
GAMMA GLOB FLD ELPH-MCNC: 0.5 G/DL
GAMMA GLOB MFR SERPL ELPH: 7.9 %
IGA 24H UR QL IFE: NORMAL
IGA SER QL IEP: 153 MG/DL
IGG SER QL IEP: 467 MG/DL
IGM SER QL IEP: 49 MG/DL
INTERPRETATION SERPL IEP-IMP: NORMAL
KAPPA LC CSF-MCNC: 0.84 MG/DL
KAPPA LC SERPL-MCNC: 1.16 MG/DL
M PROTEIN MFR SERPL ELPH: NORMAL
M PROTEIN SPEC IFE-MCNC: NORMAL
MONOCLON BAND OBS SERPL: NORMAL
PROT SERPL-MCNC: 6.7 G/DL
PROT SERPL-MCNC: 6.7 G/DL

## 2024-06-24 RX ORDER — FAMOTIDINE 40 MG/1
40 TABLET, FILM COATED ORAL
Qty: 90 | Refills: 0 | Status: ACTIVE | COMMUNITY
Start: 2022-03-03 | End: 1900-01-01

## 2024-06-26 ENCOUNTER — APPOINTMENT (OUTPATIENT)
Dept: MRI IMAGING | Facility: CLINIC | Age: 69
End: 2024-06-26

## 2024-06-26 ENCOUNTER — OUTPATIENT (OUTPATIENT)
Dept: OUTPATIENT SERVICES | Facility: HOSPITAL | Age: 69
LOS: 1 days | End: 2024-06-26
Payer: MEDICARE

## 2024-06-26 DIAGNOSIS — Z98.89 OTHER SPECIFIED POSTPROCEDURAL STATES: Chronic | ICD-10-CM

## 2024-06-26 DIAGNOSIS — D86.9 SARCOIDOSIS, UNSPECIFIED: ICD-10-CM

## 2024-06-26 DIAGNOSIS — M79.10 MYALGIA, UNSPECIFIED SITE: ICD-10-CM

## 2024-06-26 LAB
CD16+CD56+ CELLS # BLD: 467 CELLS/UL
CD16+CD56+ CELLS NFR BLD: 39 %
CD19 CELLS NFR BLD: 89 CELLS/UL
CD3 CELLS # BLD: 636 CELLS/UL
CD3 CELLS NFR BLD: 50 %
CD3+CD4+ CELLS # BLD: 500 CELLS/UL
CD3+CD4+ CELLS NFR BLD: 37 %
CD3+CD4+ CELLS/CD3+CD8+ CLL SPEC: 3.42 RATIO
CD3+CD8+ CELLS # SPEC: 146 CELLS/UL
CD3+CD8+ CELLS NFR BLD: 11 %
CELLS.CD3-CD19+/CELLS IN BLOOD: 7 %

## 2024-06-26 PROCEDURE — 73718 MRI LOWER EXTREMITY W/O DYE: CPT | Mod: MH

## 2024-06-26 PROCEDURE — 73718 MRI LOWER EXTREMITY W/O DYE: CPT | Mod: 26,LT,MH,76

## 2024-06-26 PROCEDURE — 73718 MRI LOWER EXTREMITY W/O DYE: CPT | Mod: 26,RT,MH

## 2024-06-27 LAB — CK SERPL-CCNC: 266 U/L

## 2024-08-02 ENCOUNTER — APPOINTMENT (OUTPATIENT)
Dept: PULMONOLOGY | Facility: CLINIC | Age: 69
End: 2024-08-02
Payer: MEDICARE

## 2024-08-02 VITALS
HEIGHT: 65 IN | DIASTOLIC BLOOD PRESSURE: 76 MMHG | WEIGHT: 164 LBS | SYSTOLIC BLOOD PRESSURE: 124 MMHG | RESPIRATION RATE: 16 BRPM | BODY MASS INDEX: 27.32 KG/M2 | TEMPERATURE: 97.1 F | OXYGEN SATURATION: 97 % | HEART RATE: 52 BPM

## 2024-08-02 DIAGNOSIS — R93.89 ABNORMAL FINDINGS ON DIAGNOSTIC IMAGING OF OTHER SPECIFIED BODY STRUCTURES: ICD-10-CM

## 2024-08-02 DIAGNOSIS — R06.02 SHORTNESS OF BREATH: ICD-10-CM

## 2024-08-02 DIAGNOSIS — D86.9 SARCOIDOSIS, UNSPECIFIED: ICD-10-CM

## 2024-08-02 DIAGNOSIS — J32.9 CHRONIC SINUSITIS, UNSPECIFIED: ICD-10-CM

## 2024-08-02 DIAGNOSIS — K21.9 GASTRO-ESOPHAGEAL REFLUX DISEASE W/OUT ESOPHAGITIS: ICD-10-CM

## 2024-08-02 DIAGNOSIS — J30.9 ALLERGIC RHINITIS, UNSPECIFIED: ICD-10-CM

## 2024-08-02 DIAGNOSIS — J45.909 UNSPECIFIED ASTHMA, UNCOMPLICATED: ICD-10-CM

## 2024-08-02 PROCEDURE — 94729 DIFFUSING CAPACITY: CPT

## 2024-08-02 PROCEDURE — 94010 BREATHING CAPACITY TEST: CPT

## 2024-08-02 PROCEDURE — 95012 NITRIC OXIDE EXP GAS DETER: CPT

## 2024-08-02 PROCEDURE — 99214 OFFICE O/P EST MOD 30 MIN: CPT | Mod: 25

## 2024-08-02 PROCEDURE — ZZZZZ: CPT

## 2024-08-02 PROCEDURE — 94727 GAS DIL/WSHOT DETER LNG VOL: CPT

## 2024-08-02 NOTE — HISTORY OF PRESENT ILLNESS
[FreeTextEntry1] : Mr. CROSS is a 69 year old male with a history of cardiac disease, sarcoidosis, asthma, allergy, OSAS, s/p syncope (loop recorder) who presents today  for a follow-up pulmonary evaluation. His chief complaint is   -he notes feeling generally well -he notes intermittent headaches induced by inflamed sinuses -he notes diarrhea -he notes dysphagia  -he notes irregular bowels -he notes leg muscle pain -he notes elevated CPK's  -he notes no longer taking Lipitor -he notes taking CoQ10 50 mg and Vitamin D -he notes intermittent blurry vision - attributes to medication (Gabapentin) - Dr. Seaman is optometrist   -he notes appetite is stable -he notes weight is stable -he notes low IgG -he notes using Saline as needed for sinus issues  -he notes COVID-19x2 (last 1/2024)   -he denies any nausea, emesis, fever, chills, sweats, chest pain, chest pressure, coughing, wheezing, palpitations, constipation, vertigo, leg swelling, itchy eyes, itchy ears, heartburn, reflux, or sour taste in the mouth.

## 2024-08-02 NOTE — ADDENDUM
[FreeTextEntry1] : Documented by Asa Reed acting as a scribe for Dr. Nazario Slaughter on 08/02/2024 All medical record entries made by the Scribe were at my, Dr. Nazario Slaughter's, direction and personally dictated by me on 08/02/2024 . I have reviewed the chart and agree that the record accurately reflects my personal performance of the history, physical exam, assessment and plan. I have also personally directed, reviewed, and agree with the discharge instructions.

## 2024-08-02 NOTE — ASSESSMENT
[FreeTextEntry1] : Mr. Feliz is a 69 year old male with a history of cardiac disease, sarcoidosis, asthma, allergy, OSAS, s/p syncope (loop recorder). c/w inactive sarcoidosis improved - with steroids; post COVID 19 x2 (2024) - #1 issue is MS pain legs  His shortness of breath is felt to be multifactorial due to:  -chest pain syndrome being costochondritis  -cardiac disease -poor breathing mechanics -out of shape -asthma (stable) -sarcoidosis  problem 1: asthma- (active s/p COVID 19 1/2024) -Continue Symbicort 160 2 inhalations BID  -continue Singulair 10 mg before bed -continue Spiriva 2 puffs QD  -Asthma is  believed to be caused by inherited (genetic) and environmental factor, but its exact cause is unknown. Asthma may be triggered by allergens, lung infections, or irritants in the air. Asthma triggers are different for each person -Inhaler technique reviewed as well as oral hygiene techniques reviewed with patient. Avoidance of cold air, extremes of temperature, rescue inhaler should be used before exercise. Order of medication reviewed with patient. Recommended use of a cool mist humidifier in the bedroom.  problem 2: allergic rhinitis/sinusitis (NONCOMPLIANT) - quiet  -On Dupixent 300 s9Dynyc - since 2020  -Continue Clarinex 5mg QHS  -he is being recommended to use nasal saline - Navage "Ponaris" - Qnasl 1 sniff BID or Xhance 1 sniff BID (Budesonide .5 BID) -continue Olopatadine .6 sniff each nostril BID  Environmental measures for allergies were encouraged including mattress and pillow cover, air purifier, and environmental controls.   problem 3: GERD (quiet)  -add Protonix 40 mg QAM, pre-breakfast -continue Pepcid 40 mg QHS -Rule of 2's- Avoid eating too much, too late, too poorly, too spicy, or two hours before bed  -Things to avoid including overeating, spicy foods, tight clothing, eating within three hours of bed, this list is not all inclusive.  -For treatment of reflux, possible options discussed including diet control, H2 blockers, PPIs, as well as coating motility agents discussed as treatment options. Timing of meals and proximity of last meal to sleep were discussed. If symptoms persist, a formal gastrointestinal evaluation is needed.  problem 4: NIKKY -Epsom salt bath before bed -add MyoCalm PM -he is being recommended an oral appliance and highly encourage to follow up with Dr. Nawaf Clemente -Discussed the risks/associations with coronary artery disease, atrial fibrillation, arrhythmia, memory loss, issues with concentration, stroke risk, hypertension, nocturia, chronic reflux/Hassan's esophagus some but not all inclusive. Treatment options discussed including CPAP/BiPAP machine, oral appliance, ProVent therapy, Oxy-Aid by [employer]pite[employer], new technologies, or positional sleep.   problem 5: sarcoidosis (?active) vs. ?infection vs. BOOP - improved (2nd opinion Dr. Christen Gallegos [facility])- (quiet); Marden -s/p Prednisone 20mg QDay x 1 month then reevaluate - alternate 20 mg even/15 mg odd - move to 5mg qd x2weeks then reduce by 1mg every 2weeks to off  -Information sheet given to the patient to be reviewed, this medication is never to be used without consulting the prescribing physician. Proper dietary restraint is necessary specifically salt containing foods, if any reaction may occur should be reported.   -s/p Biaxin 500 mg BID x 2 weeks - s/p CXR in 3/2022 - [facility]  -s/p CT in 2 months - 11/15/2021- returned to normal , next 2025 -continue yearly ophthalmological evaluations/PFTs -Sarcoidosis is a medical mystery and can present with very serious illness or little consequence. The disease can affect any organ including skin, liver, lymph glands, spleen, eyes, nervous system, musculoskeletal system, heart, brain, kidneys, and including the lungs. Pulmonary sarcoidosis can cause loss of lung volume and abnormal lung stiffness. This disease is characterized by presence of granulomas, small areas of inflamed cells. Sarcoidosis patients should have regular cardiac and eye examinations as well as regular PFTs.  Problem 5A: Pain -continue Neurontin 100 qHS (no interest)  problem 6: cardiac disease -continue to follow up with the cardiologist Dr. Lisker - pending Echo 3/2025 -s/p cardiac MRI 2019  problem 7: overweight -Recommended Ander Duarte's 10-day detox diet and book.  -Weight loss, exercise, and diet control were discussed and are highly encouraged. Treatment options were given such as, aqua therapy, and contacting a nutritionist. Recommended to use the elliptical, stationary bike, less use of treadmill.  Obesity is associated with worsening asthma, shortness of breath, and potential for cardiac disease, diabetes, and other underlying medical conditions.   Problem 8: Costochondritis (Quiet) - Marden follow-up -add SPM/quercitin -recommended Topricin cream -recommend P6 cream  Problem 9: Health Maintenance/COVID19 Precautions: -Recommend PEMPF mat -s/p COVID-19 infection 12/2022, 1/2025 -recommended Sanotize anti viral nasal spray in case of viral infection  - S/p Covid 19 vaccine (Moderna) x5 Immune Support Recommendations:  -OTC Vitamin C 500mg BID  -OTC Quercetin 250-500mg BID  -OTC Zinc 75-100mg per day  -OTC Melatonin 1or 2mg a night  -OTC Vitamin D 1-4000mg per day   Problem 10: health maintenance -continue Urology f/u -recommended a yearly flu shot after October 15 (2023) -recommended strep pneumonia vaccines: Prevnar-13 vaccine, followed by Pneumo vaccine 23 on year following (completed) -recommended early intervention for URIs -recommended osteoporosis evaluations -recommended early dermatological evaluations -recommended after the age of 50 to the age of 70, colonoscopy every 5 years -encouraged early intervention  F/U in 4 months with spi He is encouraged to call with any changes, concerns, or questions

## 2024-08-02 NOTE — PHYSICAL EXAM
[No Acute Distress] : no acute distress [Well Nourished] : well nourished [No Deformities] : no deformities [Well Developed] : well developed [Normal Oropharynx] : normal oropharynx [Normal Appearance] : normal appearance [No Neck Mass] : no neck mass [Normal Rate/Rhythm] : normal rate/rhythm [Murmur ___ / 6] : murmur [unfilled] / 6 [Normal S1, S2] : normal s1, s2 [No Resp Distress] : no resp distress [Clear to Auscultation Bilaterally] : clear to auscultation bilaterally [No Abnormalities] : no abnormalities [Benign] : benign [Normal Gait] : normal gait [No Clubbing] : no clubbing [No Cyanosis] : no cyanosis [No Edema] : no edema [FROM] : FROM [Normal Color/ Pigmentation] : normal color/ pigmentation [No Focal Deficits] : no focal deficits [Oriented x3] : oriented x3 [Normal Affect] : normal affect [II] : Mallampati Class: II [TextBox_68] : I:E 1:3, clear

## 2024-08-02 NOTE — PROCEDURE
[FreeTextEntry1] : Full PFT reveals normal flows; FEV1 was 3.74L which is 136% of predicted; normal lung volumes; normal diffusion at 20.9, which is 110% of predicted; normal flow volume loop. PFTs were performed to evaluate for asthma  FENO was 23; a normal value being less than 25 Fractional exhaled nitric oxide (FENO) is regarded as a simple, noninvasive method for assessing eosinophilic airway inflammation. Produced by a variety of cells within the lung, nitric oxide (NO) concentrations are generally low in healthy individuals. However, high concentrations of NO appear to be involved in nonspecific host defense mechanisms and chronic inflammatory diseases such as asthma. The American Thoracic Society (ATS) therefore has recommended using FENO to aid in the diagnosis and monitoring of eosinophilic airway inflammation and asthma, and for identifying steroid responsive individuals whose chronic respiratory symptoms may be caused by airway inflammation.

## 2024-08-13 NOTE — ED PROVIDER NOTE - NS ED MD DISPO DISCHARGE CCDA
Quality 226: Preventive Care And Screening: Tobacco Use: Screening And Cessation Intervention: Patient screened for tobacco use and is an ex/non-smoker Quality 130: Documentation Of Current Medications In The Medical Record: Current Medications Documented Quality 431: Preventive Care And Screening: Unhealthy Alcohol Use - Screening: Patient not identified as an unhealthy alcohol user when screened for unhealthy alcohol use using a systematic screening method Detail Level: Detailed Patient/Caregiver provided printed discharge information.

## 2024-08-16 ENCOUNTER — APPOINTMENT (OUTPATIENT)
Dept: UROLOGY | Facility: CLINIC | Age: 69
End: 2024-08-16
Payer: MEDICARE

## 2024-08-16 DIAGNOSIS — N30.10 INTERSTITIAL CYSTITIS (CHRONIC) W/OUT HEMATURIA: ICD-10-CM

## 2024-08-16 DIAGNOSIS — R39.15 URGENCY OF URINATION: ICD-10-CM

## 2024-08-16 DIAGNOSIS — R35.1 NOCTURIA: ICD-10-CM

## 2024-08-16 DIAGNOSIS — R10.2 PELVIC AND PERINEAL PAIN: ICD-10-CM

## 2024-08-16 DIAGNOSIS — M79.2 NEURALGIA AND NEURITIS, UNSPECIFIED: ICD-10-CM

## 2024-08-16 DIAGNOSIS — N20.0 CALCULUS OF KIDNEY: ICD-10-CM

## 2024-08-16 PROCEDURE — 51798 US URINE CAPACITY MEASURE: CPT

## 2024-08-16 PROCEDURE — 99215 OFFICE O/P EST HI 40 MIN: CPT

## 2024-08-16 RX ORDER — DIAZEPAM 2 MG/1
2 TABLET ORAL 3 TIMES DAILY
Qty: 90 | Refills: 0 | Status: ACTIVE | COMMUNITY
Start: 2024-08-16 | End: 1900-01-01

## 2024-08-16 NOTE — PHYSICAL EXAM
[General Appearance - Well Developed] : well developed [General Appearance - Well Nourished] : well nourished [Normal Appearance] : normal appearance [Heart Rate And Rhythm] : heart rate and rhythm were normal [Abdomen Soft] : soft [Abdomen Tenderness] : non-tender [Abdomen Mass (___ Cm)] : no abdominal mass palpated [Penis Abnormality] : normal circumcised penis [Urethral Meatus] : meatus normal [Testes Tenderness] : no tenderness of the testes [Testes Mass (___cm)] : there were no testicular masses [Prostate Tenderness] : the prostate was not tender [Normal Station and Gait] : the gait and station were normal for the patient's age [Skin Turgor] : supple [] : no rash [No Focal Deficits] : no focal deficits [Oriented To Time, Place, And Person] : oriented to person, place, and time [Not Anxious] : not anxious [de-identified] : OMZ=800 ml [de-identified] : slightly enlarged, without nodules, lilateral muscle tenderness L>R

## 2024-08-16 NOTE — HISTORY OF PRESENT ILLNESS
[FreeTextEntry1] : Mr. Feliz is a 69 years old male with history of chronic interstitial cystitis, myalgia of pelvic floor, nephrolithiasis, nocturia, pelvic pain in the office today after 8 months hiatus.  The patient reports having flares of pelvic pain and bladder pain for the past 2 months.  After review of patient's GUPI questionnaire pain 9 of 10 on a scale of 0-10.  The patient endorses pain in the perineum, tip of the penis below the waist, burning and pain with urination pain with bladder feeling and discomfort relieved by voiding.  Furthermore the patient reports severe hesitancy and sensation of incomplete emptying, nocturia x 5-6 without hematuria, patient endorses constipation, pain with directions and ejaculations.  Urine will be sent today for urinalysis and urine culture, urine cytology.  I instructed the patient to fold back on aggressive techniques of pelvic floor relaxation i.e. not pushing with urination aggressive treatment of constipation with flaxseed oil, problems, produce her Milk of Magnesia.  Heat to the pelvic area 2 times per day and Valium 2 mg 3 times per day.  Prescription for physical therapy to [facility] was sent.  I spoke with Mr. Feliz about results of renal ultrasound from December of last year that revealed a 9 mm kidney stone.  After review of the chart I had a telephone conversation with the patient who promised to provide me with a CT scan that was done 2 weeks prior to the ultrasound that revealed no nephrolithiasis.  Once again I asked Mr. Feliz to provide me with such records by the time of the next appointment, otherwise I will consider ordering a CT stone-for the patient at our future follow-up.    I had a discussion with Mr. Feliz about resuming intravesical therapy which the patient would like to think about.  I would like Mr. Feliz to follow-up in the office in 3 weeks for reevaluation and if the patient is not agreeable to intravascular installations we can restart gabapentin.  Mr. Feliz felt the gabapentin caused worsening of urinary stream which I do not necessarily agree.  I feel that worsening of the stream is directly related to significant tightness of pelvic floor musculature.  I advised the patient to increase the dosing of alfuzosin to 2 capsules at night for the next 3 weeks.

## 2024-08-19 LAB
APPEARANCE: CLEAR
BACTERIA UR CULT: NORMAL
BACTERIA: NEGATIVE /HPF
BILIRUBIN URINE: NEGATIVE
BLOOD URINE: NEGATIVE
CAST: 0 /LPF
COLOR: YELLOW
EPITHELIAL CELLS: 0 /HPF
GLUCOSE QUALITATIVE U: NEGATIVE MG/DL
KETONES URINE: NEGATIVE MG/DL
LEUKOCYTE ESTERASE URINE: NEGATIVE
MICROSCOPIC-UA: NORMAL
NITRITE URINE: NEGATIVE
PH URINE: 5.5
PROTEIN URINE: NEGATIVE MG/DL
RED BLOOD CELLS URINE: 0 /HPF
SPECIFIC GRAVITY URINE: 1.02
UROBILINOGEN URINE: 0.2 MG/DL
WHITE BLOOD CELLS URINE: 1 /HPF

## 2024-08-20 LAB — URINE CYTOLOGY: NORMAL

## 2024-08-27 ENCOUNTER — APPOINTMENT (OUTPATIENT)
Dept: CARDIOLOGY | Facility: CLINIC | Age: 69
End: 2024-08-27

## 2024-09-04 ENCOUNTER — RX RENEWAL (OUTPATIENT)
Age: 69
End: 2024-09-04

## 2024-09-25 ENCOUNTER — APPOINTMENT (OUTPATIENT)
Dept: UROLOGY | Facility: CLINIC | Age: 69
End: 2024-09-25

## 2024-10-28 ENCOUNTER — APPOINTMENT (OUTPATIENT)
Dept: CARDIOLOGY | Facility: CLINIC | Age: 69
End: 2024-10-28
Payer: MEDICARE

## 2024-10-28 ENCOUNTER — NON-APPOINTMENT (OUTPATIENT)
Age: 69
End: 2024-10-28

## 2024-10-28 VITALS
OXYGEN SATURATION: 95 % | BODY MASS INDEX: 27.99 KG/M2 | WEIGHT: 168 LBS | SYSTOLIC BLOOD PRESSURE: 128 MMHG | HEART RATE: 51 BPM | DIASTOLIC BLOOD PRESSURE: 77 MMHG | HEIGHT: 65 IN

## 2024-10-28 DIAGNOSIS — I45.10 UNSPECIFIED RIGHT BUNDLE-BRANCH BLOCK: ICD-10-CM

## 2024-10-28 DIAGNOSIS — I25.10 ATHEROSCLEROTIC HEART DISEASE OF NATIVE CORONARY ARTERY W/OUT ANGINA PECTORIS: ICD-10-CM

## 2024-10-28 PROCEDURE — 99214 OFFICE O/P EST MOD 30 MIN: CPT

## 2024-10-28 PROCEDURE — 93000 ELECTROCARDIOGRAM COMPLETE: CPT

## 2024-12-05 ENCOUNTER — APPOINTMENT (OUTPATIENT)
Dept: PULMONOLOGY | Facility: CLINIC | Age: 69
End: 2024-12-05
Payer: MEDICARE

## 2024-12-05 VITALS
WEIGHT: 167 LBS | HEIGHT: 65 IN | RESPIRATION RATE: 16 BRPM | OXYGEN SATURATION: 97 % | HEART RATE: 51 BPM | DIASTOLIC BLOOD PRESSURE: 74 MMHG | SYSTOLIC BLOOD PRESSURE: 118 MMHG | BODY MASS INDEX: 27.82 KG/M2

## 2024-12-05 DIAGNOSIS — J30.9 ALLERGIC RHINITIS, UNSPECIFIED: ICD-10-CM

## 2024-12-05 DIAGNOSIS — K21.9 GASTRO-ESOPHAGEAL REFLUX DISEASE W/OUT ESOPHAGITIS: ICD-10-CM

## 2024-12-05 DIAGNOSIS — R93.89 ABNORMAL FINDINGS ON DIAGNOSTIC IMAGING OF OTHER SPECIFIED BODY STRUCTURES: ICD-10-CM

## 2024-12-05 DIAGNOSIS — R06.02 SHORTNESS OF BREATH: ICD-10-CM

## 2024-12-05 DIAGNOSIS — J45.909 UNSPECIFIED ASTHMA, UNCOMPLICATED: ICD-10-CM

## 2024-12-05 DIAGNOSIS — J33.9 NASAL POLYP, UNSPECIFIED: ICD-10-CM

## 2024-12-05 DIAGNOSIS — D86.9 SARCOIDOSIS, UNSPECIFIED: ICD-10-CM

## 2024-12-05 PROCEDURE — 99214 OFFICE O/P EST MOD 30 MIN: CPT | Mod: 25

## 2024-12-05 PROCEDURE — 95012 NITRIC OXIDE EXP GAS DETER: CPT

## 2024-12-05 PROCEDURE — 94729 DIFFUSING CAPACITY: CPT

## 2024-12-05 PROCEDURE — 94010 BREATHING CAPACITY TEST: CPT

## 2024-12-05 PROCEDURE — 94727 GAS DIL/WSHOT DETER LNG VOL: CPT

## 2024-12-05 PROCEDURE — ZZZZZ: CPT

## 2024-12-06 ENCOUNTER — RX RENEWAL (OUTPATIENT)
Age: 69
End: 2024-12-06

## 2025-01-10 ENCOUNTER — RX RENEWAL (OUTPATIENT)
Age: 70
End: 2025-01-10

## 2025-01-24 ENCOUNTER — APPOINTMENT (OUTPATIENT)
Dept: CARDIOLOGY | Facility: CLINIC | Age: 70
End: 2025-01-24
Payer: MEDICARE

## 2025-01-24 ENCOUNTER — NON-APPOINTMENT (OUTPATIENT)
Age: 70
End: 2025-01-24

## 2025-01-24 VITALS
HEART RATE: 61 BPM | OXYGEN SATURATION: 98 % | BODY MASS INDEX: 28.04 KG/M2 | WEIGHT: 168.5 LBS | SYSTOLIC BLOOD PRESSURE: 118 MMHG | DIASTOLIC BLOOD PRESSURE: 76 MMHG

## 2025-01-24 DIAGNOSIS — I25.10 ATHEROSCLEROTIC HEART DISEASE OF NATIVE CORONARY ARTERY W/OUT ANGINA PECTORIS: ICD-10-CM

## 2025-01-24 DIAGNOSIS — D86.9 SARCOIDOSIS, UNSPECIFIED: ICD-10-CM

## 2025-01-24 DIAGNOSIS — I45.10 UNSPECIFIED RIGHT BUNDLE-BRANCH BLOCK: ICD-10-CM

## 2025-01-24 PROCEDURE — 99214 OFFICE O/P EST MOD 30 MIN: CPT

## 2025-01-24 PROCEDURE — 93000 ELECTROCARDIOGRAM COMPLETE: CPT

## 2025-01-24 PROCEDURE — G2211 COMPLEX E/M VISIT ADD ON: CPT

## 2025-02-04 ENCOUNTER — APPOINTMENT (OUTPATIENT)
Dept: CARDIOLOGY | Facility: CLINIC | Age: 70
End: 2025-02-04
Payer: MEDICARE

## 2025-02-04 PROCEDURE — A9500: CPT

## 2025-02-04 PROCEDURE — 93015 CV STRESS TEST SUPVJ I&R: CPT

## 2025-02-04 PROCEDURE — 78452 HT MUSCLE IMAGE SPECT MULT: CPT

## 2025-02-20 ENCOUNTER — APPOINTMENT (OUTPATIENT)
Dept: CARDIOLOGY | Facility: CLINIC | Age: 70
End: 2025-02-20

## 2025-02-20 ENCOUNTER — APPOINTMENT (OUTPATIENT)
Dept: PODIATRY | Facility: CLINIC | Age: 70
End: 2025-02-20

## 2025-02-20 DIAGNOSIS — L89.891 PRESSURE ULCER OF OTHER SITE, STAGE 1: ICD-10-CM

## 2025-02-20 DIAGNOSIS — M79.672 PAIN IN LEFT FOOT: ICD-10-CM

## 2025-02-20 PROCEDURE — 99203 OFFICE O/P NEW LOW 30 MIN: CPT | Mod: 25

## 2025-02-20 PROCEDURE — 97597 DBRDMT OPN WND 1ST 20 CM/<: CPT | Mod: LT

## 2025-02-20 RX ORDER — MUPIROCIN 20 MG/G
2 OINTMENT TOPICAL TWICE DAILY
Qty: 1 | Refills: 1 | Status: ACTIVE | COMMUNITY
Start: 2025-02-20 | End: 1900-01-01

## 2025-02-25 PROBLEM — M79.672 LEFT FOOT PAIN: Status: ACTIVE | Noted: 2025-02-21

## 2025-02-25 PROBLEM — L89.891 PRESSURE INJURY OF LEFT FOOT, STAGE 1: Status: ACTIVE | Noted: 2025-02-21

## 2025-02-28 ENCOUNTER — RX RENEWAL (OUTPATIENT)
Age: 70
End: 2025-02-28

## 2025-03-03 ENCOUNTER — RX RENEWAL (OUTPATIENT)
Age: 70
End: 2025-03-03

## 2025-04-02 ENCOUNTER — RX RENEWAL (OUTPATIENT)
Age: 70
End: 2025-04-02

## 2025-04-07 ENCOUNTER — RX RENEWAL (OUTPATIENT)
Age: 70
End: 2025-04-07

## 2025-04-28 ENCOUNTER — NON-APPOINTMENT (OUTPATIENT)
Age: 70
End: 2025-04-28

## 2025-04-29 ENCOUNTER — NON-APPOINTMENT (OUTPATIENT)
Age: 70
End: 2025-04-29

## 2025-04-29 ENCOUNTER — APPOINTMENT (OUTPATIENT)
Dept: CARDIOLOGY | Facility: CLINIC | Age: 70
End: 2025-04-29
Payer: MEDICARE

## 2025-04-29 VITALS
DIASTOLIC BLOOD PRESSURE: 76 MMHG | WEIGHT: 167 LBS | HEART RATE: 68 BPM | BODY MASS INDEX: 27.82 KG/M2 | HEIGHT: 65 IN | SYSTOLIC BLOOD PRESSURE: 122 MMHG | OXYGEN SATURATION: 97 %

## 2025-04-29 DIAGNOSIS — I25.10 ATHEROSCLEROTIC HEART DISEASE OF NATIVE CORONARY ARTERY W/OUT ANGINA PECTORIS: ICD-10-CM

## 2025-04-29 DIAGNOSIS — R55 SYNCOPE AND COLLAPSE: ICD-10-CM

## 2025-04-29 PROCEDURE — 93000 ELECTROCARDIOGRAM COMPLETE: CPT

## 2025-04-29 PROCEDURE — 99214 OFFICE O/P EST MOD 30 MIN: CPT

## 2025-05-08 ENCOUNTER — APPOINTMENT (OUTPATIENT)
Dept: PULMONOLOGY | Facility: CLINIC | Age: 70
End: 2025-05-08
Payer: MEDICARE

## 2025-05-08 VITALS
SYSTOLIC BLOOD PRESSURE: 120 MMHG | OXYGEN SATURATION: 96 % | RESPIRATION RATE: 16 BRPM | BODY MASS INDEX: 27.99 KG/M2 | WEIGHT: 168 LBS | HEART RATE: 59 BPM | TEMPERATURE: 97.2 F | DIASTOLIC BLOOD PRESSURE: 78 MMHG | HEIGHT: 65 IN

## 2025-05-08 DIAGNOSIS — J30.9 ALLERGIC RHINITIS, UNSPECIFIED: ICD-10-CM

## 2025-05-08 DIAGNOSIS — J45.909 UNSPECIFIED ASTHMA, UNCOMPLICATED: ICD-10-CM

## 2025-05-08 DIAGNOSIS — G47.30 SLEEP APNEA, UNSPECIFIED: ICD-10-CM

## 2025-05-08 DIAGNOSIS — K21.9 GASTRO-ESOPHAGEAL REFLUX DISEASE W/OUT ESOPHAGITIS: ICD-10-CM

## 2025-05-08 DIAGNOSIS — R06.02 SHORTNESS OF BREATH: ICD-10-CM

## 2025-05-08 DIAGNOSIS — D86.9 SARCOIDOSIS, UNSPECIFIED: ICD-10-CM

## 2025-05-08 DIAGNOSIS — J32.4 CHRONIC PANSINUSITIS: ICD-10-CM

## 2025-05-08 DIAGNOSIS — R93.89 ABNORMAL FINDINGS ON DIAGNOSTIC IMAGING OF OTHER SPECIFIED BODY STRUCTURES: ICD-10-CM

## 2025-05-08 PROCEDURE — 94727 GAS DIL/WSHOT DETER LNG VOL: CPT

## 2025-05-08 PROCEDURE — 99214 OFFICE O/P EST MOD 30 MIN: CPT | Mod: 25

## 2025-05-08 PROCEDURE — 94729 DIFFUSING CAPACITY: CPT

## 2025-05-08 PROCEDURE — 94010 BREATHING CAPACITY TEST: CPT

## 2025-05-08 PROCEDURE — 95012 NITRIC OXIDE EXP GAS DETER: CPT

## 2025-05-08 PROCEDURE — ZZZZZ: CPT

## 2025-06-25 ENCOUNTER — RX RENEWAL (OUTPATIENT)
Age: 70
End: 2025-06-25

## 2025-06-27 ENCOUNTER — APPOINTMENT (OUTPATIENT)
Dept: UROLOGY | Facility: CLINIC | Age: 70
End: 2025-06-27

## 2025-06-27 PROCEDURE — 51798 US URINE CAPACITY MEASURE: CPT

## 2025-06-27 PROCEDURE — 99215 OFFICE O/P EST HI 40 MIN: CPT

## 2025-06-27 RX ORDER — DIAZEPAM 100 %
POWDER (GRAM) MISCELLANEOUS
Qty: 30 | Refills: 3 | Status: ACTIVE | COMMUNITY
Start: 2025-06-27 | End: 1900-01-01

## 2025-06-27 RX ORDER — PREGABALIN 50 MG/1
50 CAPSULE ORAL
Qty: 90 | Refills: 1 | Status: ACTIVE | COMMUNITY
Start: 2025-06-27 | End: 1900-01-01

## 2025-06-30 LAB
APPEARANCE: CLEAR
BACTERIA UR CULT: NORMAL
BACTERIA: NEGATIVE /HPF
BILIRUBIN URINE: NEGATIVE
BLOOD URINE: NEGATIVE
CAST: 0 /LPF
COLOR: YELLOW
EPITHELIAL CELLS: 0 /HPF
GLUCOSE QUALITATIVE U: NEGATIVE MG/DL
KETONES URINE: NEGATIVE MG/DL
LEUKOCYTE ESTERASE URINE: NEGATIVE
MICROSCOPIC-UA: NORMAL
NITRITE URINE: NEGATIVE
PH URINE: 5.5
PROTEIN URINE: NEGATIVE MG/DL
RED BLOOD CELLS URINE: 1 /HPF
SPECIFIC GRAVITY URINE: 1.02
UROBILINOGEN URINE: 0.2 MG/DL
WHITE BLOOD CELLS URINE: 0 /HPF

## 2025-07-01 LAB — URINE CYTOLOGY: NORMAL

## 2025-07-31 ENCOUNTER — RX RENEWAL (OUTPATIENT)
Age: 70
End: 2025-07-31